# Patient Record
Sex: MALE | Race: WHITE | NOT HISPANIC OR LATINO | Employment: OTHER | URBAN - METROPOLITAN AREA
[De-identification: names, ages, dates, MRNs, and addresses within clinical notes are randomized per-mention and may not be internally consistent; named-entity substitution may affect disease eponyms.]

---

## 2021-04-08 ENCOUNTER — NURSING HOME VISIT (OUTPATIENT)
Dept: FAMILY MEDICINE CLINIC | Facility: CLINIC | Age: 86
End: 2021-04-08
Payer: COMMERCIAL

## 2021-04-08 DIAGNOSIS — M05.9 RHEUMATOID ARTHRITIS WITH POSITIVE RHEUMATOID FACTOR, INVOLVING UNSPECIFIED SITE (HCC): ICD-10-CM

## 2021-04-08 DIAGNOSIS — Z00.00 WELL ADULT EXAM: ICD-10-CM

## 2021-04-08 DIAGNOSIS — G30.1 LATE ONSET ALZHEIMER'S DISEASE WITH BEHAVIORAL DISTURBANCE (HCC): Primary | ICD-10-CM

## 2021-04-08 DIAGNOSIS — F03.91 AGITATION DUE TO DEMENTIA (HCC): ICD-10-CM

## 2021-04-08 DIAGNOSIS — N39.43 BENIGN PROSTATIC HYPERPLASIA WITH POST-VOID DRIBBLING: ICD-10-CM

## 2021-04-08 DIAGNOSIS — N40.1 BENIGN PROSTATIC HYPERPLASIA WITH POST-VOID DRIBBLING: ICD-10-CM

## 2021-04-08 DIAGNOSIS — F02.81 LATE ONSET ALZHEIMER'S DISEASE WITH BEHAVIORAL DISTURBANCE (HCC): Primary | ICD-10-CM

## 2021-04-08 PROCEDURE — 1123F ACP DISCUSS/DSCN MKR DOCD: CPT | Performed by: FAMILY MEDICINE

## 2021-04-08 PROCEDURE — 99335 PR DOM/R-HOME E/M EST PT LW MOD SEVERITY 25 MINUTES: CPT | Performed by: FAMILY MEDICINE

## 2021-04-08 PROCEDURE — G0439 PPPS, SUBSEQ VISIT: HCPCS | Performed by: FAMILY MEDICINE

## 2021-04-11 VITALS
SYSTOLIC BLOOD PRESSURE: 142 MMHG | HEIGHT: 74 IN | TEMPERATURE: 98 F | HEART RATE: 84 BPM | DIASTOLIC BLOOD PRESSURE: 80 MMHG | BODY MASS INDEX: 32.34 KG/M2 | WEIGHT: 252 LBS | RESPIRATION RATE: 18 BRPM

## 2021-04-11 PROBLEM — F03.911 AGITATION DUE TO DEMENTIA: Status: ACTIVE | Noted: 2021-04-11

## 2021-04-11 PROBLEM — N39.43 BENIGN PROSTATIC HYPERPLASIA WITH POST-VOID DRIBBLING: Status: ACTIVE | Noted: 2021-04-11

## 2021-04-11 PROBLEM — N40.1 BENIGN PROSTATIC HYPERPLASIA WITH POST-VOID DRIBBLING: Status: ACTIVE | Noted: 2021-04-11

## 2021-04-11 PROBLEM — M05.9 RHEUMATOID ARTHRITIS WITH POSITIVE RHEUMATOID FACTOR (HCC): Status: ACTIVE | Noted: 2021-04-11

## 2021-04-11 PROBLEM — F02.81 LATE ONSET ALZHEIMER'S DISEASE WITH BEHAVIORAL DISTURBANCE (HCC): Status: ACTIVE | Noted: 2021-04-11

## 2021-04-11 PROBLEM — G30.1 LATE ONSET ALZHEIMER'S DISEASE WITH BEHAVIORAL DISTURBANCE (HCC): Status: ACTIVE | Noted: 2021-04-11

## 2021-04-11 PROBLEM — F02.818 LATE ONSET ALZHEIMER'S DISEASE WITH BEHAVIORAL DISTURBANCE (HCC): Status: ACTIVE | Noted: 2021-04-11

## 2021-04-11 PROBLEM — F03.91 AGITATION DUE TO DEMENTIA (HCC): Status: ACTIVE | Noted: 2021-04-11

## 2021-04-11 RX ORDER — CITALOPRAM 20 MG/1
1 TABLET ORAL DAILY
COMMUNITY

## 2021-04-11 RX ORDER — DONEPEZIL HYDROCHLORIDE 10 MG/1
10 TABLET, FILM COATED ORAL
Qty: 30 TABLET | Refills: 11
Start: 2021-04-11

## 2021-04-11 RX ORDER — CLOPIDOGREL BISULFATE 75 MG/1
1 TABLET ORAL DAILY
COMMUNITY

## 2021-04-11 RX ORDER — METOPROLOL TARTRATE 50 MG/1
1 TABLET, FILM COATED ORAL DAILY
COMMUNITY

## 2021-04-11 RX ORDER — PREDNISONE 20 MG/1
1 TABLET ORAL DAILY
COMMUNITY

## 2021-04-11 NOTE — PATIENT INSTRUCTIONS

## 2021-04-11 NOTE — PROGRESS NOTES
BMI Counseling: Body mass index is 32 35 kg/m²  The BMI is above normal  Nutrition recommendations include decreasing portion sizes, encouraging healthy choices of fruits and vegetables, decreasing fast food intake, consuming healthier snacks, limiting drinks that contain sugar, moderation in carbohydrate intake, increasing intake of lean protein, reducing intake of saturated and trans fat and reducing intake of cholesterol  No pharmacotherapy was ordered  Patient referred to PCP due to patient being overweight  Assessment/Plan:         Problem List Items Addressed This Visit        Nervous and Auditory    Late onset Alzheimer's disease with behavioral disturbance ([facility] [address] ) - Primary     Add aricept  And check UA C+S  Consider  seroquelinI future prn  Relevant Medications    citalopram (CeleXA) 20 mg tablet    donepezil (ARICEPT) 10 mg tablet    Other Relevant Orders    UA/M w/rflx Culture, Routine       Musculoskeletal and Integument    Rheumatoid arthritis with positive rheumatoid factor (HCC)    Relevant Medications    predniSONE 20 mg tablet       Genitourinary    Benign prostatic hyperplasia with post-void dribbling       Other    Agitation due to dementia [facility])      Other Visit Diagnoses     Well adult exam                Subjective:      Patient ID: Lele Orr is a 80 y o  male  This is an 40-year-old white male seen examined at an assisted living facility  Patient has been noted by staff to be more wandering and trying to leave the facility  They state his left dementia has improved significantly recently and he is looking get evaluated today  Patient is oblivious to his the situation at this point  He is alert and awake and he confabulates quite a bit we your asking questions    Patient is not agitated at this time although he is somewhat confused staff reports at night he does do more wandering and at times of was looking to leave facility although he never made a real full hard attempt  The following portions of the patient's history were reviewed and updated as appropriate:   Past Medical History:  He has no past medical history on file ,  _______________________________________________________________________  Medical Problems:  does not have any pertinent problems on file ,  _______________________________________________________________________  Past Surgical History:   has no past surgical history on file ,  _______________________________________________________________________  Family History:  family history is not on file ,  _______________________________________________________________________  Social History:   reports that he has never smoked  He has never used smokeless tobacco  He reports previous alcohol use  He reports that he does not use drugs  ,  _______________________________________________________________________  Allergies:  has no allergies on file     _______________________________________________________________________  Current Outpatient Medications   Medication Sig Dispense Refill    citalopram (CeleXA) 20 mg tablet Take 1 tablet by mouth daily      clopidogrel (Plavix) 75 mg tablet Take 1 tablet by mouth daily      donepezil (ARICEPT) 10 mg tablet Take 1 tablet (10 mg total) by mouth daily at bedtime 30 tablet 11    metoprolol tartrate (LOPRESSOR) 50 mg tablet Take 1 tablet by mouth daily      predniSONE 20 mg tablet Take 1 tablet by mouth daily       No current facility-administered medications for this visit       _______________________________________________________________________  Review of Systems   Constitutional: Positive for activity change  Negative for appetite change, chills, fatigue, fever and unexpected weight change  HENT: Negative for congestion, ear pain, hearing loss, mouth sores, postnasal drip, sinus pressure, sinus pain, sneezing and sore throat  Respiratory: Negative for apnea, cough, shortness of breath and wheezing  Cardiovascular: Negative for chest pain, palpitations and leg swelling  Gastrointestinal: Negative for abdominal pain, constipation, diarrhea, nausea and vomiting  Endocrine: Negative for cold intolerance and heat intolerance  Genitourinary: Negative for dysuria, frequency and hematuria  Musculoskeletal: Negative for arthralgias, back pain, gait problem, joint swelling and neck pain  Skin: Negative for rash  Neurological: Negative for dizziness, weakness and numbness  Hematological: Does not bruise/bleed easily  Psychiatric/Behavioral: Positive for agitation and confusion  Negative for behavioral problems, hallucinations and sleep disturbance  The patient is not nervous/anxious  Objective:  Vitals:    04/11/21 1516   BP: 142/80   Pulse: 84   Resp: 18   Temp: 98 °F (36 7 °C)   Weight: 114 kg (252 lb)   Height: 6' 2" (1 88 m)     Body mass index is 32 35 kg/m²  Physical Exam  Vitals signs and nursing note reviewed  Constitutional:       General: He is in acute distress  Appearance: He is well-developed  He is obese  He is not ill-appearing or toxic-appearing  HENT:      Head: Normocephalic and atraumatic  Nose: Nose normal       Mouth/Throat:      Mouth: Mucous membranes are moist    Eyes:      General: No scleral icterus  Conjunctiva/sclera: Conjunctivae normal       Pupils: Pupils are equal, round, and reactive to light  Neck:      Musculoskeletal: Normal range of motion and neck supple  Thyroid: No thyromegaly  Cardiovascular:      Rate and Rhythm: Normal rate and regular rhythm  Heart sounds: Normal heart sounds  Pulmonary:      Effort: Pulmonary effort is normal  No respiratory distress  Breath sounds: Normal breath sounds  No wheezing  Abdominal:      General: Bowel sounds are normal       Palpations: Abdomen is soft  Tenderness: There is no abdominal tenderness  There is no guarding or rebound     Musculoskeletal: Normal range of motion  Skin:     General: Skin is warm and dry  Findings: No rash  Neurological:      Mental Status: He is alert  He is disoriented  Motor: Weakness present        Gait: Gait abnormal    Psychiatric:         Behavior: Behavior normal

## 2021-05-13 ENCOUNTER — NURSING HOME VISIT (OUTPATIENT)
Dept: FAMILY MEDICINE CLINIC | Facility: CLINIC | Age: 86
End: 2021-05-13
Payer: COMMERCIAL

## 2021-05-13 DIAGNOSIS — F02.81 LATE ONSET ALZHEIMER'S DISEASE WITH BEHAVIORAL DISTURBANCE (HCC): Primary | ICD-10-CM

## 2021-05-13 DIAGNOSIS — G30.1 LATE ONSET ALZHEIMER'S DISEASE WITH BEHAVIORAL DISTURBANCE (HCC): Primary | ICD-10-CM

## 2021-05-13 PROCEDURE — 99335 PR DOM/R-HOME E/M EST PT LW MOD SEVERITY 25 MINUTES: CPT | Performed by: FAMILY MEDICINE

## 2021-05-23 RX ORDER — QUETIAPINE FUMARATE 50 MG/1
50 TABLET, FILM COATED ORAL
Qty: 30 TABLET | Refills: 11
Start: 2021-05-23

## 2021-05-23 NOTE — ASSESSMENT & PLAN NOTE
Will increase seroquel to 50 mg q hs and continue  With mood/behavioral modification  redireting patient

## 2021-05-23 NOTE — PROGRESS NOTES
[facility] Plan of Care: balance, strength, and gait training instructions were provided  Home safety education provided  Assessment/Plan:         Problem List Items Addressed This Visit        Nervous and Auditory    Late onset Alzheimer's disease with behavioral disturbance ([facility] [address] ) - Primary            Subjective:      Patient ID: Vinod Rosen is a 80 y o  male  This 66-year-old white male seen examined at the assisted living facility  Patient has dementia and has been having problems with behaviors with some agitation and rude behavior with other residents as well as the staff  Patient had been started on Seroquel 25 mg daily at bedtime will increase up to 50 mg and discussed with staff about redirecting the patient would become agitated and try to avoid irritating him when  he has some worsening dementia  The following portions of the patient's history were reviewed and updated as appropriate:   Past Medical History:  He has no past medical history on file ,  _______________________________________________________________________  Medical Problems:  does not have any pertinent problems on file ,  _______________________________________________________________________  Past Surgical History:   has no past surgical history on file ,  _______________________________________________________________________  Family History:  family history is not on file ,  _______________________________________________________________________  Social History:   reports that he has never smoked  He has never used smokeless tobacco  He reports previous alcohol use  He reports that he does not use drugs  ,  _______________________________________________________________________  Allergies:  is allergic to doxycycline; levaquin [levofloxacin]; and penicillins     _______________________________________________________________________  Current Outpatient Medications   Medication Sig Dispense Refill    citalopram (CeleXA) 20 mg tablet Take 1 tablet by mouth daily      clopidogrel (Plavix) 75 mg tablet Take 1 tablet by mouth daily      donepezil (ARICEPT) 10 mg tablet Take 1 tablet (10 mg total) by mouth daily at bedtime 30 tablet 11    metoprolol tartrate (LOPRESSOR) 50 mg tablet Take 1 tablet by mouth daily      predniSONE 20 mg tablet Take 1 tablet by mouth daily       No current facility-administered medications for this visit       _______________________________________________________________________  Review of Systems   Constitutional: Negative for activity change, appetite change, chills, fatigue, fever and unexpected weight change  HENT: Negative for congestion, ear pain, hearing loss, mouth sores, postnasal drip, sinus pressure, sinus pain, sneezing and sore throat  Respiratory: Negative for apnea, cough, shortness of breath and wheezing  Cardiovascular: Negative for chest pain, palpitations and leg swelling  Gastrointestinal: Negative for abdominal pain, constipation, diarrhea, nausea and vomiting  Endocrine: Negative for cold intolerance and heat intolerance  Genitourinary: Negative for dysuria, frequency and hematuria  Musculoskeletal: Negative for arthralgias, back pain, gait problem, joint swelling and neck pain  Skin: Negative for rash  Neurological: Negative for dizziness, weakness and numbness  Hematological: Does not bruise/bleed easily  Psychiatric/Behavioral: Positive for agitation, behavioral problems and confusion  Negative for hallucinations and sleep disturbance  The patient is nervous/anxious  Objective: There were no vitals filed for this visit  There is no height or weight on file to calculate BMI  Physical Exam  Vitals signs and nursing note reviewed  Constitutional:       Appearance: He is well-developed  He is obese  HENT:      Head: Normocephalic and atraumatic  Eyes:      General: No scleral icterus       Conjunctiva/sclera: Conjunctivae normal  Pupils: Pupils are equal, round, and reactive to light  Neck:      Musculoskeletal: Normal range of motion and neck supple  Thyroid: No thyromegaly  Cardiovascular:      Rate and Rhythm: Normal rate and regular rhythm  Heart sounds: Normal heart sounds  Pulmonary:      Effort: Pulmonary effort is normal  No respiratory distress  Breath sounds: Normal breath sounds  No wheezing  Abdominal:      General: Bowel sounds are normal       Palpations: Abdomen is soft  Tenderness: There is no abdominal tenderness  There is no guarding or rebound  Musculoskeletal: Normal range of motion  Skin:     General: Skin is warm and dry  Findings: No rash  Neurological:      Mental Status: He is alert  Mental status is at baseline  He is disoriented     Psychiatric:         Behavior: Behavior normal

## 2021-09-23 ENCOUNTER — NURSING HOME VISIT (OUTPATIENT)
Dept: FAMILY MEDICINE CLINIC | Facility: CLINIC | Age: 86
End: 2021-09-23
Payer: COMMERCIAL

## 2021-09-23 DIAGNOSIS — F03.91 AGITATION DUE TO DEMENTIA (HCC): ICD-10-CM

## 2021-09-23 DIAGNOSIS — G30.1 LATE ONSET ALZHEIMER'S DISEASE WITH BEHAVIORAL DISTURBANCE (HCC): Primary | ICD-10-CM

## 2021-09-23 DIAGNOSIS — M05.9 RHEUMATOID ARTHRITIS WITH POSITIVE RHEUMATOID FACTOR, INVOLVING UNSPECIFIED SITE (HCC): ICD-10-CM

## 2021-09-23 DIAGNOSIS — R53.83 OTHER FATIGUE: ICD-10-CM

## 2021-09-23 DIAGNOSIS — F02.81 LATE ONSET ALZHEIMER'S DISEASE WITH BEHAVIORAL DISTURBANCE (HCC): Primary | ICD-10-CM

## 2021-09-23 DIAGNOSIS — I73.9 PVD (PERIPHERAL VASCULAR DISEASE) (HCC): ICD-10-CM

## 2021-09-23 DIAGNOSIS — I10 PRIMARY HYPERTENSION: ICD-10-CM

## 2021-09-23 PROCEDURE — 99318 PR E/M ANNUAL NURSING FACILITY ASSESS STABLE 30 MIN: CPT | Performed by: FAMILY MEDICINE

## 2021-10-24 VITALS
HEART RATE: 84 BPM | WEIGHT: 252 LBS | BODY MASS INDEX: 35.28 KG/M2 | SYSTOLIC BLOOD PRESSURE: 126 MMHG | HEIGHT: 71 IN | RESPIRATION RATE: 18 BRPM | DIASTOLIC BLOOD PRESSURE: 74 MMHG | TEMPERATURE: 98 F

## 2021-10-24 PROBLEM — I10 PRIMARY HYPERTENSION: Status: ACTIVE | Noted: 2021-10-24

## 2021-10-24 PROBLEM — I73.9 PVD (PERIPHERAL VASCULAR DISEASE) (HCC): Status: ACTIVE | Noted: 2021-10-24

## 2022-05-12 ENCOUNTER — IN HOME VISIT (OUTPATIENT)
Dept: FAMILY MEDICINE CLINIC | Facility: CLINIC | Age: 87
End: 2022-05-12
Payer: COMMERCIAL

## 2022-05-12 DIAGNOSIS — U07.1 COVID-19: Primary | ICD-10-CM

## 2022-05-12 PROCEDURE — 99336 PR DOM/R-HOME E/M EST PT MOD HI SEVERITY 40 MINUTES: CPT | Performed by: FAMILY MEDICINE

## 2022-05-12 NOTE — PROGRESS NOTES
Assessment/Plan:         Problem List Items Addressed This Visit    None     Visit Diagnoses     COVID-19    -  Primary    Relevant Medications    molnupiravir 200 mg capsule    Other Relevant Orders    XR chest pa & lateral            Subjective:      Patient ID: Krystina Ni is a 80 y o  male  5year-old male seen examined this is living facility for COVID-19  Patient has been having some issues with some cough and congestion some nasal can runny nose but no chest pain or shortness of breath or leg pain  He denies any taste or smell alterations  Patient was screened by staff of his done any positive for COVID  The following portions of the patient's history were reviewed and updated as appropriate:   Past Medical History:  He has no past medical history on file ,  _______________________________________________________________________  Medical Problems:  does not have any pertinent problems on file ,  _______________________________________________________________________  Past Surgical History:   has no past surgical history on file ,  _______________________________________________________________________  Family History:  family history is not on file ,  _______________________________________________________________________  Social History:   reports that he has never smoked  He has never used smokeless tobacco  He reports previous alcohol use  He reports that he does not use drugs  ,  _______________________________________________________________________  Allergies:  is allergic to doxycycline, levaquin [levofloxacin], and penicillins     _______________________________________________________________________  Current Outpatient Medications   Medication Sig Dispense Refill    molnupiravir 200 mg capsule Take 4 capsules (800 mg total) by mouth every 12 (twelve) hours for 5 days 40 capsule 0    citalopram (CeleXA) 20 mg tablet Take 1 tablet by mouth daily      clopidogrel (Plavix) 75 mg tablet Take 1 tablet by mouth daily      donepezil (ARICEPT) 10 mg tablet Take 1 tablet (10 mg total) by mouth daily at bedtime 30 tablet 11    metoprolol tartrate (LOPRESSOR) 50 mg tablet Take 1 tablet by mouth daily      predniSONE 20 mg tablet Take 1 tablet by mouth daily      QUEtiapine (SEROquel) 50 mg tablet Take 1 tablet (50 mg total) by mouth daily at bedtime 30 tablet 11     No current facility-administered medications for this visit      _______________________________________________________________________  Review of Systems   Constitutional: Positive for fatigue  Negative for activity change, appetite change, chills, fever and unexpected weight change  HENT: Positive for postnasal drip  Negative for congestion, ear pain, hearing loss, mouth sores, sinus pressure, sinus pain, sneezing and sore throat  Respiratory: Positive for cough  Negative for apnea, shortness of breath and wheezing  Cardiovascular: Negative for chest pain, palpitations and leg swelling  Gastrointestinal: Negative for abdominal pain, constipation, diarrhea, nausea and vomiting  Endocrine: Negative for cold intolerance and heat intolerance  Genitourinary: Negative for dysuria, frequency and hematuria  Musculoskeletal: Negative for arthralgias, back pain, gait problem, joint swelling and neck pain  Skin: Negative for rash  Neurological: Negative for dizziness, weakness and numbness  Hematological: Does not bruise/bleed easily  Psychiatric/Behavioral: Negative for agitation, behavioral problems, confusion, hallucinations and sleep disturbance  The patient is not nervous/anxious  Objective: There were no vitals filed for this visit  There is no height or weight on file to calculate BMI  Physical Exam  Vitals and nursing note reviewed  Constitutional:       Appearance: He is well-developed  HENT:      Head: Normocephalic and atraumatic        Nose: Nose normal       Mouth/Throat:      Mouth: Mucous membranes are moist    Eyes:      General: No scleral icterus  Conjunctiva/sclera: Conjunctivae normal       Pupils: Pupils are equal, round, and reactive to light  Neck:      Thyroid: No thyromegaly  Cardiovascular:      Rate and Rhythm: Normal rate and regular rhythm  Heart sounds: Normal heart sounds  Pulmonary:      Effort: Pulmonary effort is normal  No respiratory distress  Breath sounds: Normal breath sounds  No wheezing  Abdominal:      General: Bowel sounds are normal       Palpations: Abdomen is soft  Tenderness: There is no abdominal tenderness  There is no guarding or rebound  Musculoskeletal:         General: Normal range of motion  Cervical back: Normal range of motion and neck supple  Skin:     General: Skin is warm and dry  Findings: No rash  Neurological:      Mental Status: He is alert and oriented to person, place, and time     Psychiatric:         Behavior: Behavior normal

## 2022-06-18 ENCOUNTER — APPOINTMENT (EMERGENCY)
Dept: CT IMAGING | Facility: HOSPITAL | Age: 87
End: 2022-06-18
Payer: COMMERCIAL

## 2022-06-18 ENCOUNTER — HOSPITAL ENCOUNTER (EMERGENCY)
Facility: HOSPITAL | Age: 87
Discharge: HOME | End: 2022-06-19
Attending: EMERGENCY MEDICINE
Payer: COMMERCIAL

## 2022-06-18 ENCOUNTER — APPOINTMENT (EMERGENCY)
Dept: ULTRASOUND IMAGING | Facility: HOSPITAL | Age: 87
End: 2022-06-18
Payer: COMMERCIAL

## 2022-06-18 DIAGNOSIS — R07.89 ATYPICAL CHEST PAIN: Primary | ICD-10-CM

## 2022-06-18 LAB
2HR DELTA HS TROPONIN: -1 NG/L
4HR DELTA HS TROPONIN: 1 NG/L
ALBUMIN SERPL BCP-MCNC: 3.3 G/DL (ref 3.5–5)
ALP SERPL-CCNC: 59 U/L (ref 34–104)
ALT SERPL W P-5'-P-CCNC: 18 U/L (ref 7–52)
ANION GAP SERPL CALCULATED.3IONS-SCNC: 7 MMOL/L (ref 4–13)
AST SERPL W P-5'-P-CCNC: 75 U/L (ref 13–39)
ATRIAL RATE: 64 BPM
BASOPHILS # BLD AUTO: 0.02 THOUSANDS/ΜL (ref 0–0.1)
BASOPHILS NFR BLD AUTO: 0 % (ref 0–1)
BILIRUB SERPL-MCNC: 0.5 MG/DL (ref 0.2–1)
BNP SERPL-MCNC: 185 PG/ML (ref 0–100)
BUN SERPL-MCNC: 35 MG/DL (ref 5–25)
CALCIUM ALBUM COR SERPL-MCNC: 8.7 MG/DL (ref 8.3–10.1)
CALCIUM SERPL-MCNC: 8.1 MG/DL (ref 8.4–10.2)
CARDIAC TROPONIN I PNL SERPL HS: 28 NG/L
CARDIAC TROPONIN I PNL SERPL HS: 29 NG/L
CARDIAC TROPONIN I PNL SERPL HS: 30 NG/L
CHLORIDE SERPL-SCNC: 109 MMOL/L (ref 96–108)
CO2 SERPL-SCNC: 26 MMOL/L (ref 21–32)
CREAT SERPL-MCNC: 1.74 MG/DL (ref 0.6–1.3)
D DIMER PPP FEU-MCNC: 1.52 UG/ML FEU
EOSINOPHIL # BLD AUTO: 0.25 THOUSAND/ΜL (ref 0–0.61)
EOSINOPHIL NFR BLD AUTO: 4 % (ref 0–6)
ERYTHROCYTE [DISTWIDTH] IN BLOOD BY AUTOMATED COUNT: 13.9 % (ref 11.6–15.1)
GFR SERPL CREATININE-BSD FRML MDRD: 33 ML/MIN/1.73SQ M
GLUCOSE SERPL-MCNC: 129 MG/DL (ref 65–140)
HCT VFR BLD AUTO: 37.6 % (ref 36.5–49.3)
HGB BLD-MCNC: 12 G/DL (ref 12–17)
IMM GRANULOCYTES # BLD AUTO: 0.04 THOUSAND/UL (ref 0–0.2)
IMM GRANULOCYTES NFR BLD AUTO: 1 % (ref 0–2)
LIPASE SERPL-CCNC: 25 U/L (ref 11–82)
LYMPHOCYTES # BLD AUTO: 1.29 THOUSANDS/ΜL (ref 0.6–4.47)
LYMPHOCYTES NFR BLD AUTO: 19 % (ref 14–44)
MCH RBC QN AUTO: 31.8 PG (ref 26.8–34.3)
MCHC RBC AUTO-ENTMCNC: 31.9 G/DL (ref 31.4–37.4)
MCV RBC AUTO: 100 FL (ref 82–98)
MONOCYTES # BLD AUTO: 0.57 THOUSAND/ΜL (ref 0.17–1.22)
MONOCYTES NFR BLD AUTO: 9 % (ref 4–12)
NEUTROPHILS # BLD AUTO: 4.53 THOUSANDS/ΜL (ref 1.85–7.62)
NEUTS SEG NFR BLD AUTO: 67 % (ref 43–75)
NRBC BLD AUTO-RTO: 0 /100 WBCS
P AXIS: 76 DEGREES
PLATELET # BLD AUTO: 157 THOUSANDS/UL (ref 149–390)
PMV BLD AUTO: 11.1 FL (ref 8.9–12.7)
POTASSIUM SERPL-SCNC: 4.5 MMOL/L (ref 3.5–5.3)
PR INTERVAL: 200 MS
PROT SERPL-MCNC: 5.7 G/DL (ref 6.4–8.4)
QRS AXIS: 79 DEGREES
QRSD INTERVAL: 130 MS
QT INTERVAL: 456 MS
QTC INTERVAL: 464 MS
RBC # BLD AUTO: 3.77 MILLION/UL (ref 3.88–5.62)
SODIUM SERPL-SCNC: 142 MMOL/L (ref 135–147)
T WAVE AXIS: 19 DEGREES
VENTRICULAR RATE: 62 BPM
WBC # BLD AUTO: 6.7 THOUSAND/UL (ref 4.31–10.16)

## 2022-06-18 PROCEDURE — 36415 COLL VENOUS BLD VENIPUNCTURE: CPT | Performed by: EMERGENCY MEDICINE

## 2022-06-18 PROCEDURE — 99285 EMERGENCY DEPT VISIT HI MDM: CPT | Performed by: EMERGENCY MEDICINE

## 2022-06-18 PROCEDURE — 71275 CT ANGIOGRAPHY CHEST: CPT

## 2022-06-18 PROCEDURE — 96360 HYDRATION IV INFUSION INIT: CPT

## 2022-06-18 PROCEDURE — 74177 CT ABD & PELVIS W/CONTRAST: CPT

## 2022-06-18 PROCEDURE — 85379 FIBRIN DEGRADATION QUANT: CPT | Performed by: EMERGENCY MEDICINE

## 2022-06-18 PROCEDURE — 96361 HYDRATE IV INFUSION ADD-ON: CPT

## 2022-06-18 PROCEDURE — 93010 ELECTROCARDIOGRAM REPORT: CPT | Performed by: INTERNAL MEDICINE

## 2022-06-18 PROCEDURE — 83880 ASSAY OF NATRIURETIC PEPTIDE: CPT | Performed by: EMERGENCY MEDICINE

## 2022-06-18 PROCEDURE — 99285 EMERGENCY DEPT VISIT HI MDM: CPT

## 2022-06-18 PROCEDURE — 76705 ECHO EXAM OF ABDOMEN: CPT

## 2022-06-18 PROCEDURE — G1004 CDSM NDSC: HCPCS

## 2022-06-18 PROCEDURE — 80053 COMPREHEN METABOLIC PANEL: CPT | Performed by: EMERGENCY MEDICINE

## 2022-06-18 PROCEDURE — 84484 ASSAY OF TROPONIN QUANT: CPT | Performed by: EMERGENCY MEDICINE

## 2022-06-18 PROCEDURE — 83690 ASSAY OF LIPASE: CPT | Performed by: EMERGENCY MEDICINE

## 2022-06-18 PROCEDURE — 85025 COMPLETE CBC W/AUTO DIFF WBC: CPT | Performed by: EMERGENCY MEDICINE

## 2022-06-18 PROCEDURE — 93005 ELECTROCARDIOGRAM TRACING: CPT

## 2022-06-18 RX ORDER — TAMSULOSIN HYDROCHLORIDE 0.4 MG/1
0.4 CAPSULE ORAL
Status: DISCONTINUED | OUTPATIENT
Start: 2022-06-18 | End: 2022-06-19 | Stop reason: HOSPADM

## 2022-06-18 RX ORDER — QUETIAPINE FUMARATE 25 MG/1
100 TABLET, FILM COATED ORAL
Status: DISCONTINUED | OUTPATIENT
Start: 2022-06-18 | End: 2022-06-19 | Stop reason: HOSPADM

## 2022-06-18 RX ORDER — PREDNISONE 10 MG/1
10 TABLET ORAL DAILY
Status: DISCONTINUED | OUTPATIENT
Start: 2022-06-19 | End: 2022-06-19 | Stop reason: HOSPADM

## 2022-06-18 RX ORDER — MEMANTINE HYDROCHLORIDE 10 MG/1
10 TABLET ORAL 2 TIMES DAILY
Status: DISCONTINUED | OUTPATIENT
Start: 2022-06-18 | End: 2022-06-19 | Stop reason: HOSPADM

## 2022-06-18 RX ORDER — GABAPENTIN 100 MG/1
100 CAPSULE ORAL 2 TIMES DAILY
Status: DISCONTINUED | OUTPATIENT
Start: 2022-06-18 | End: 2022-06-19 | Stop reason: HOSPADM

## 2022-06-18 RX ORDER — DONEPEZIL HYDROCHLORIDE 5 MG/1
10 TABLET, FILM COATED ORAL
Status: DISCONTINUED | OUTPATIENT
Start: 2022-06-18 | End: 2022-06-19 | Stop reason: HOSPADM

## 2022-06-18 RX ADMIN — IOHEXOL 65 ML: 350 INJECTION, SOLUTION INTRAVENOUS at 12:58

## 2022-06-18 RX ADMIN — GABAPENTIN 100 MG: 100 CAPSULE ORAL at 19:53

## 2022-06-18 RX ADMIN — TAMSULOSIN HYDROCHLORIDE 0.4 MG: 0.4 CAPSULE ORAL at 19:53

## 2022-06-18 RX ADMIN — DONEPEZIL HYDROCHLORIDE 10 MG: 5 TABLET, FILM COATED ORAL at 21:53

## 2022-06-18 RX ADMIN — SODIUM CHLORIDE 500 ML: 0.9 INJECTION, SOLUTION INTRAVENOUS at 12:42

## 2022-06-18 RX ADMIN — MEMANTINE 10 MG: 10 TABLET ORAL at 19:53

## 2022-06-18 RX ADMIN — QUETIAPINE FUMARATE 100 MG: 25 TABLET ORAL at 21:53

## 2022-06-18 NOTE — DISCHARGE INSTRUCTIONS
Mr Mira Frazier liver function was mildly abnormal in the Emergency Department today  He should have his liver function, kidney function, and blood pressure rechecked by his doctor in 1 week

## 2022-06-18 NOTE — ED PROVIDER NOTES
History  Chief Complaint   Patient presents with    Chest Pain     Chest pain that pt has "had for a while " Hx of dementia, pt poor historian  HPI    42-year-old male with history of dementia, hypertension, MI with PCI in his 45s  Patient presents for evaluation of substernal chest pain  He is a poor historian due to his dementia but tells me that he thinks that started earlier in the day today  He has difficulty describing the pain but states that it does not radiate  Reports nausea without vomiting  No dizziness or difficulty breathing  He also reports some periumbilical abdominal pain but is unable to provide any other details regarding this  No fever or chills  He received nitroglycerin and route by EMS but does not remember whether or not this helps with his pain  I a made numerous attempts to get in contact with the patient's skilled nursing facility that were unsuccessful  I did speak with his daughter, Brynn Lucas, who is his healthcare power of   She confirms that he does have a history of MI with stenting in his 45s  Denies knowledge of complaint of chest pain prior to today  She confirms that the patient is currently at his baseline mental status which is alert, oriented only to person and place, and pleasantly confused  Prior to Admission Medications   Prescriptions Last Dose Informant Patient Reported? Taking?    QUEtiapine (SEROquel) 50 mg tablet   No No   Sig: Take 1 tablet (50 mg total) by mouth daily at bedtime   citalopram (CeleXA) 20 mg tablet   Yes No   Sig: Take 1 tablet by mouth daily   clopidogrel (Plavix) 75 mg tablet   Yes No   Sig: Take 1 tablet by mouth daily   donepezil (ARICEPT) 10 mg tablet   No No   Sig: Take 1 tablet (10 mg total) by mouth daily at bedtime   metoprolol tartrate (LOPRESSOR) 50 mg tablet   Yes No   Sig: Take 1 tablet by mouth daily   predniSONE 20 mg tablet   Yes No   Sig: Take 1 tablet by mouth daily      Facility-Administered Medications: None       History reviewed  No pertinent past medical history  History reviewed  No pertinent surgical history  History reviewed  No pertinent family history  I have reviewed and agree with the history as documented  E-Cigarette/Vaping     E-Cigarette/Vaping Substances     Social History     Tobacco Use    Smoking status: Never Smoker    Smokeless tobacco: Never Used   Substance Use Topics    Alcohol use: Not Currently    Drug use: Never       Review of Systems   Unable to perform ROS: Dementia       Physical Exam  Physical Exam  Constitutional:       General: He is not in acute distress  Appearance: He is well-developed  He is not diaphoretic  HENT:      Head: Normocephalic and atraumatic  Right Ear: External ear normal       Left Ear: External ear normal       Nose: Nose normal    Eyes:      Conjunctiva/sclera: Conjunctivae normal    Cardiovascular:      Rate and Rhythm: Normal rate and regular rhythm  Pulses: Normal pulses  Heart sounds: Normal heart sounds  No murmur heard  No friction rub  No gallop  Pulmonary:      Effort: Pulmonary effort is normal  No respiratory distress  Breath sounds: Normal breath sounds  No wheezing or rales  Abdominal:      General: Bowel sounds are normal  There is no distension  Palpations: Abdomen is soft  Tenderness: There is no abdominal tenderness  There is no guarding  Comments: Small umbilical hernia, easily reducible, nontender, without overlying skin changes  Patient reports mild tenderness to deep palpation in the periumbilical region of the abdomen and in the epigastrium  Musculoskeletal:         General: No deformity  Normal range of motion  Cervical back: Normal range of motion and neck supple  Comments: No calf swelling or tenderness   Skin:     General: Skin is warm and dry  Neurological:      Mental Status: He is alert  Motor: No abnormal muscle tone        Comments: Oriented to person and knows that he is in the hospital   Otherwise disoriented and unable to provide reliable medical history   Psychiatric:         Mood and Affect: Mood normal          Vital Signs  ED Triage Vitals   Temperature Pulse Respirations Blood Pressure SpO2   06/18/22 1051 06/18/22 1051 06/18/22 1051 06/18/22 1051 06/18/22 1051   97 6 °F (36 4 °C) 66 16 119/54 98 %      Temp Source Heart Rate Source Patient Position - Orthostatic VS BP Location FiO2 (%)   06/18/22 1051 06/18/22 1051 06/18/22 1313 -- --   Oral Monitor Sitting        Pain Score       06/18/22 1313       No Pain           Vitals:    06/18/22 1051 06/18/22 1130 06/18/22 1200 06/18/22 1313   BP: 119/54 118/59 145/66 (!) 183/86   Pulse: 66 58 58 68   Patient Position - Orthostatic VS:    Sitting         Visual Acuity      ED Medications  Medications   donepezil (ARICEPT) tablet 10 mg (has no administration in time range)   gabapentin (NEURONTIN) capsule 100 mg (has no administration in time range)   memantine (NAMENDA) tablet 10 mg (has no administration in time range)   predniSONE tablet 10 mg (has no administration in time range)   QUEtiapine (SEROquel) tablet 100 mg (has no administration in time range)   tamsulosin (FLOMAX) capsule 0 4 mg (has no administration in time range)   sodium chloride 0 9 % bolus 500 mL (0 mL Intravenous Stopped 6/18/22 1500)   iohexol (OMNIPAQUE) 350 MG/ML injection (SINGLE-DOSE) 65 mL (65 mL Intravenous Given 6/18/22 1258)       Diagnostic Studies  Results Reviewed     Procedure Component Value Units Date/Time    HS Troponin I 4hr [[id number]]  (Normal) Collected: 06/18/22 1755    Lab Status: Final result Specimen: Blood from Arm, Left Updated: 06/18/22 1831     hs TnI 4hr 30 ng/L      Delta 4hr hsTnI 1 ng/L     HS Troponin I 2hr [[id number]]  (Normal) Collected: 06/18/22 1516    Lab Status: Final result Specimen: Blood from Arm, Left Updated: 06/18/22 1604     hs TnI 2hr 28 ng/L      Delta 2hr hsTnI -1 ng/L     HS Troponin 0hr (reflex protocol) [[id number]]  (Normal) Collected: 06/18/22 1200    Lab Status: Final result Specimen: Blood from Arm, Left Updated: 06/18/22 1247     hs TnI 0hr 29 ng/L     B-Type Natriuretic Peptide(BNP), AN, CA, EA Campuses Only [[id number]]  (Abnormal) Collected: 06/18/22 1142    Lab Status: Final result Specimen: Blood Updated: 06/18/22 1228      pg/mL     Comprehensive metabolic panel [[id number]]  (Abnormal) Collected: 06/18/22 1141    Lab Status: Final result Specimen: Blood Updated: 06/18/22 1214     Sodium 142 mmol/L      Potassium 4 5 mmol/L      Chloride 109 mmol/L      CO2 26 mmol/L      ANION GAP 7 mmol/L      BUN 35 mg/dL      Creatinine 1 74 mg/dL      Glucose 129 mg/dL      Calcium 8 1 mg/dL      Corrected Calcium 8 7 mg/dL      AST 75 U/L      ALT 18 U/L      Alkaline Phosphatase 59 U/L      Total Protein 5 7 g/dL      Albumin 3 3 g/dL      Total Bilirubin 0 50 mg/dL      eGFR 33 ml/min/1 73sq m     Narrative:      [facility] guidelines for Chronic Kidney Disease (CKD):     Stage 1 with normal or high GFR (GFR > 90 mL/min/1 73 square meters)    Stage 2 Mild CKD (GFR = 60-89 mL/min/1 73 square meters)    Stage 3A Moderate CKD (GFR = 45-59 mL/min/1 73 square meters)    Stage 3B Moderate CKD (GFR = 30-44 mL/min/1 73 square meters)    Stage 4 Severe CKD (GFR = 15-29 mL/min/1 73 square meters)    Stage 5 End Stage CKD (GFR <15 mL/min/1 73 square meters)  Note: GFR calculation is accurate only with a steady state creatinine    Lipase [[id number]]  (Normal) Collected: 06/18/22 1141    Lab Status: Final result Specimen: Blood Updated: 06/18/22 1214     Lipase 25 u/L     D-dimer, quantitative [[id number]]  (Abnormal) Collected: 06/18/22 1142    Lab Status: Final result Specimen: Blood Updated: 06/18/22 1208     D-Dimer, Quant 1 52 ug/ml FEU     Narrative:       In the evaluation for possible pulmonary embolism, in the appropriate (Well's Score of 4 or less) patient, the age adjusted d-dimer cutoff for this patient can be calculated as:    Age x 0 01 (in ug/mL) for Age-adjusted D-dimer exclusion threshold for a patient over 50 years  CBC and differential [[id number]]  (Abnormal) Collected: 06/18/22 1142    Lab Status: Final result Specimen: Blood Updated: 06/18/22 1152     WBC 6 70 Thousand/uL      RBC 3 77 Million/uL      Hemoglobin 12 0 g/dL      Hematocrit 37 6 %       fL      MCH 31 8 pg      MCHC 31 9 g/dL      RDW 13 9 %      MPV 11 1 fL      Platelets 444 Thousands/uL      nRBC 0 /100 WBCs      Neutrophils Relative 67 %      Immat GRANS % 1 %      Lymphocytes Relative 19 %      Monocytes Relative 9 %      Eosinophils Relative 4 %      Basophils Relative 0 %      Neutrophils Absolute 4 53 Thousands/µL      Immature Grans Absolute 0 04 Thousand/uL      Lymphocytes Absolute 1 29 Thousands/µL      Monocytes Absolute 0 57 Thousand/µL      Eosinophils Absolute 0 25 Thousand/µL      Basophils Absolute 0 02 Thousands/µL                  US right upper quadrant   Final Result by Stefania West MD (06/18 8638)      Gallbladder is contracted with numerous calculi and a negative sonographic Palma's sign  Cholecystitis is unlikely  Pericholecystic fluid is nonspecific and may be secondary to hepatic disease  Workstation performed: [id number]         PE Study with CT abdomen & pelvis with contrast   Final Result by Baron Abbey MD (06/18 7083)      1  No pulmonary embolus is seen  2   2 cm maximum thickness posterior pericardial effusion  3   Abnormal appearing gallbladder with pericholecystic edema  A dedicated ultrasound is recommended  The study was marked in [facility] for immediate notification                    Workstation performed: [id number]                    Procedures  Procedures         ED Course  ED Course as of 06/18/22 1839   Sat Jun 18, 2022   1117 I personally interpreted the patient's EKG which reveals normal rate, normal sinus rhythm, normal axis, QTC of 464 milliseconds, QRS duration of 130 milliseconds, nonspecific T-wave abnormality isolated to lead 3, no prior available for comparison  1235 D-dimer elevated to 1 52  Will obtain CTA of the chest and scan through the abdomen pelvis given patient's symptoms  Baseline creatinine is unknown  Creatinine is elevated to 1 74 so will give 500 cc of normal saline prior to CT scan  1333 Initial troponin 29, delta pending  1509 CT scan of the chest with CT of the abdomen pelvis shows no evidence of PE  Patient does have a 2 cm pericardial effusion as well as abnormal appearance to the gallbladder with pericholecystic fluid  On reexamination he does have some mild tenderness to deep palpation of the right upper quadrant but denies nausea or vomiting  Per Radiology recommendations will obtain an ultrasound of the right upper quadrant for further evaluation  1736 [facility] right upper quadrant   1713 Delta troponin -1  US of the gallbladder without sonographic efrain's sign, not consistent with acute cholecysitis  1837 Discussed findings with patient's daughter who is his medical power of  by phone  She tells me that the patient has CKD stage 4 so his creatinine today is consistent with this  I discussed the abnormal appearance of his gallbladder with her by phone and questionable underlying liver disease with elevated AST of 75  As he is asymptomatic currently there is no indication for admission to the hospital but is daughter was counseled about the importance of follow-up with his primary care doctor in 1-2 weeks for repeat check of his kidney function, liver function, and blood pressure  She expressed agreement understanding and stated that she can help to make sure that this happens  Most of his blood pressures while in the emergency department were normal but he did have 1 hypertensive pressure to 926 systolic    As most of his pressures are normal, will not initiate treatment with antihypertensives at this time however his daughter was counseled that they should keep a log of his blood pressures and if they remain elevated he may need to be started on antihypertensive agent  Patient was discharged back to his skilled nursing facility in good condition with no complaints  He may be awaiting transport back to his facility for quite some time so home medications were ordered as well as a diet order  SBIRT 22yo+    Flowsheet Row Most Recent Value   SBIRT (23 yo +)    In order to provide better care to our patients, we are screening all of our patients for alcohol and drug use  Would it be okay to ask you these screening questions? Unable to answer at this time Filed at: 06/18/2022 1151                    MDM  Number of Diagnoses or Management Options  Atypical chest pain: new and requires workup  Diagnosis management comments: Please see ED course above for details of the Medical Decision Making  Amount and/or Complexity of Data Reviewed  Clinical lab tests: ordered and reviewed  Tests in the radiology section of CPT®: ordered and reviewed  Obtain history from someone other than the patient: yes  Review and summarize past medical records: yes  Independent visualization of images, tracings, or specimens: yes    Patient Progress  Patient progress: stable      Disposition  Final diagnoses:   Atypical chest pain     Time reflects when diagnosis was documented in both MDM as applicable and the Disposition within this note     Time User Action Codes Description Comment    6/18/2022  5:45 PM Diamond Madrigal Add [R07 89] Atypical chest pain       ED Disposition     ED Disposition   Discharge    Condition   Stable    Date/Time   Sat Jun 18, 2022  5:45 PM    Comment   Amanda Bonilla discharge to home/self care                 Follow-up Information     Follow up With Specialties Details Why Madonna Waldrop MD Family Medicine Please follow-up with your doctor in 1 week for recheck of your liver function, kidney function, and blood pressure  [id number] [facility] [address] [place] [id number] [phone]            Patient's Medications   Discharge Prescriptions    No medications on file       No discharge procedures on file      PDMP Review     None          ED Provider  Electronically Signed by           Kiran Bai MD  06/18/22 0342

## 2022-06-18 NOTE — ED NOTES
[facility] Contacted for transport back to [facility]  Did not give an ETA on  time        Ok Neither  06/18/22 1837       Ok Neither  06/18/22 1840

## 2022-06-19 VITALS
SYSTOLIC BLOOD PRESSURE: 180 MMHG | HEIGHT: 71 IN | TEMPERATURE: 97.6 F | OXYGEN SATURATION: 98 % | HEART RATE: 58 BPM | BODY MASS INDEX: 30.28 KG/M2 | DIASTOLIC BLOOD PRESSURE: 83 MMHG | RESPIRATION RATE: 18 BRPM | WEIGHT: 216.27 LBS

## 2022-06-19 RX ADMIN — GABAPENTIN 100 MG: 100 CAPSULE ORAL at 09:20

## 2022-06-19 RX ADMIN — PREDNISONE 10 MG: 10 TABLET ORAL at 09:20

## 2022-06-19 RX ADMIN — MEMANTINE 10 MG: 10 TABLET ORAL at 09:20

## 2022-06-19 NOTE — ED NOTES
Patient sleeping in stretcher  No distressed noted  Lights dimmed and Call button within reach  Will reassess patients vitals when awake        Malathi Flood RN  06/19/22 2276

## 2022-06-19 NOTE — ED NOTES
RN contacted [employer] to check transportation time  No pickup time yet for patient  Marika Livers from [facility] will notify charge when pickup time is assigned        Karime Beyer RN  06/19/22 0188

## 2022-06-19 NOTE — ED NOTES
Patient currently in bed w/ lights dimmed for comfort  RN attempted vital signs to assess patient  Patient refusing vital signs at this time/ patient wants to sleep  [place] provided to patient and call bell at bedside        Lupis Jones RN  06/18/22 9945

## 2022-06-19 NOTE — ED NOTES
Report attempt to H[facility]ctor Falcon - unable to leave message, voicemail box not set up     Priscilla Granados RN  06/19/22 1269

## 2022-06-19 NOTE — ED NOTES
Patient incontinent - large bm; linens changed, patient cleaned up     Carmen Thomason RN  06/19/22 3826

## 2022-06-19 NOTE — ED NOTES
Patient sitting on side of bed after 1 incontinence occurrence  Patient cleaned and sheets changed  Patient repositioned in clean bed and provided w/ pudding and coffee  Lights dimmed for comfort and patient provided with call button        Janelle Panchal RN  06/18/22 4874

## 2022-06-19 NOTE — ED NOTES
Telephone call to [facility] for transport  No pickup time for patient as of yet  A/w call back        Jamia Menendez RN  06/19/22 4698

## 2022-06-19 NOTE — ED NOTES
Patient resting comfortably in bed  Provided w/ new linens and repositioned in bed        Gloria Lau RN  06/19/22 0600

## 2022-06-19 NOTE — ED NOTES
Patient assisted to restroom   Patient has unsteady gait and requires hand held assistance w/ ambulation     Ryan Garcia RN  06/18/22 4311

## 2022-06-19 NOTE — ED NOTES
Per Olamide De if we could give her a call back with a transport time for patients return @ [phone]       Lu Vicente  06/18/22 8832

## 2022-06-19 NOTE — ED NOTES
Transfer Information:    Ambulance Squad: [facility]ol[facility] Lyons Time: 1330   Destination:    Accepting Physician:    Report Number:         Gabo Krause RN  06/19/22 2044

## 2022-06-19 NOTE — ED NOTES
Patient still sleeping comfortably in stretcher  Will continue to monitor  Call bell within reach        Josse Oakley RN  06/19/22 5564

## 2022-08-04 ENCOUNTER — NURSING HOME VISIT (OUTPATIENT)
Dept: FAMILY MEDICINE CLINIC | Facility: CLINIC | Age: 87
End: 2022-08-04
Payer: COMMERCIAL

## 2022-08-04 DIAGNOSIS — L30.9 DERMATITIS: Primary | ICD-10-CM

## 2022-08-04 DIAGNOSIS — F03.911 AGITATION DUE TO DEMENTIA: ICD-10-CM

## 2022-08-04 PROCEDURE — 99336 PR DOM/R-HOME E/M EST PT MOD HI SEVERITY 40 MINUTES: CPT | Performed by: FAMILY MEDICINE

## 2022-08-13 ENCOUNTER — APPOINTMENT (EMERGENCY)
Dept: RADIOLOGY | Facility: HOSPITAL | Age: 87
DRG: 690 | End: 2022-08-13
Payer: COMMERCIAL

## 2022-08-13 ENCOUNTER — HOSPITAL ENCOUNTER (INPATIENT)
Facility: HOSPITAL | Age: 87
LOS: 6 days | Discharge: NURSING, REHAB OR LONG-TERM CARE | DRG: 690 | End: 2022-08-20
Attending: EMERGENCY MEDICINE | Admitting: INTERNAL MEDICINE
Payer: COMMERCIAL

## 2022-08-13 DIAGNOSIS — E83.42 HYPOMAGNESEMIA: ICD-10-CM

## 2022-08-13 DIAGNOSIS — M05.9 RHEUMATOID ARTHRITIS WITH POSITIVE RHEUMATOID FACTOR, INVOLVING UNSPECIFIED SITE (HCC): ICD-10-CM

## 2022-08-13 DIAGNOSIS — N39.0 UTI (URINARY TRACT INFECTION): Primary | ICD-10-CM

## 2022-08-13 DIAGNOSIS — S31.809A WOUND OF BUTTOCK, UNSPECIFIED LATERALITY, INITIAL ENCOUNTER: ICD-10-CM

## 2022-08-13 PROBLEM — E87.0 HYPERNATREMIA: Status: ACTIVE | Noted: 2022-08-13

## 2022-08-13 LAB
2HR DELTA HS TROPONIN: -5 NG/L
4HR DELTA HS TROPONIN: -6 NG/L
ALBUMIN SERPL BCP-MCNC: 3.2 G/DL (ref 3.5–5)
ALP SERPL-CCNC: 52 U/L (ref 34–104)
ALT SERPL W P-5'-P-CCNC: 9 U/L (ref 7–52)
ANION GAP SERPL CALCULATED.3IONS-SCNC: 6 MMOL/L (ref 4–13)
APTT PPP: 32 SECONDS (ref 23–37)
AST SERPL W P-5'-P-CCNC: 14 U/L (ref 13–39)
BACTERIA UR QL AUTO: ABNORMAL /HPF
BASOPHILS # BLD AUTO: 0.02 THOUSANDS/ΜL (ref 0–0.1)
BASOPHILS NFR BLD AUTO: 0 % (ref 0–1)
BILIRUB SERPL-MCNC: 0.37 MG/DL (ref 0.2–1)
BILIRUB UR QL STRIP: NEGATIVE
BUN SERPL-MCNC: 44 MG/DL (ref 5–25)
CALCIUM ALBUM COR SERPL-MCNC: 8.8 MG/DL (ref 8.3–10.1)
CALCIUM SERPL-MCNC: 8.2 MG/DL (ref 8.4–10.2)
CARDIAC TROPONIN I PNL SERPL HS: 34 NG/L
CARDIAC TROPONIN I PNL SERPL HS: 35 NG/L
CARDIAC TROPONIN I PNL SERPL HS: 40 NG/L
CHLORIDE SERPL-SCNC: 113 MMOL/L (ref 96–108)
CLARITY UR: CLEAR
CO2 SERPL-SCNC: 27 MMOL/L (ref 21–32)
COLOR UR: YELLOW
CREAT SERPL-MCNC: 1.86 MG/DL (ref 0.6–1.3)
EOSINOPHIL # BLD AUTO: 0.1 THOUSAND/ΜL (ref 0–0.61)
EOSINOPHIL NFR BLD AUTO: 1 % (ref 0–6)
ERYTHROCYTE [DISTWIDTH] IN BLOOD BY AUTOMATED COUNT: 13.8 % (ref 11.6–15.1)
FLUAV RNA RESP QL NAA+PROBE: NEGATIVE
FLUBV RNA RESP QL NAA+PROBE: NEGATIVE
GFR SERPL CREATININE-BSD FRML MDRD: 31 ML/MIN/1.73SQ M
GLUCOSE SERPL-MCNC: 120 MG/DL (ref 65–140)
GLUCOSE UR STRIP-MCNC: NEGATIVE MG/DL
HCT VFR BLD AUTO: 37.3 % (ref 36.5–49.3)
HGB BLD-MCNC: 12 G/DL (ref 12–17)
HGB UR QL STRIP.AUTO: ABNORMAL
IMM GRANULOCYTES # BLD AUTO: 0.08 THOUSAND/UL (ref 0–0.2)
IMM GRANULOCYTES NFR BLD AUTO: 1 % (ref 0–2)
INR PPP: 0.96 (ref 0.84–1.19)
KETONES UR STRIP-MCNC: NEGATIVE MG/DL
LACTATE SERPL-SCNC: 1.8 MMOL/L (ref 0.5–2)
LEUKOCYTE ESTERASE UR QL STRIP: ABNORMAL
LYMPHOCYTES # BLD AUTO: 0.88 THOUSANDS/ΜL (ref 0.6–4.47)
LYMPHOCYTES NFR BLD AUTO: 11 % (ref 14–44)
MAGNESIUM SERPL-MCNC: 1.8 MG/DL (ref 1.9–2.7)
MCH RBC QN AUTO: 32.1 PG (ref 26.8–34.3)
MCHC RBC AUTO-ENTMCNC: 32.2 G/DL (ref 31.4–37.4)
MCV RBC AUTO: 100 FL (ref 82–98)
MONOCYTES # BLD AUTO: 0.44 THOUSAND/ΜL (ref 0.17–1.22)
MONOCYTES NFR BLD AUTO: 6 % (ref 4–12)
NEUTROPHILS # BLD AUTO: 6.28 THOUSANDS/ΜL (ref 1.85–7.62)
NEUTS SEG NFR BLD AUTO: 81 % (ref 43–75)
NITRITE UR QL STRIP: POSITIVE
NON-SQ EPI CELLS URNS QL MICRO: ABNORMAL /HPF
NRBC BLD AUTO-RTO: 0 /100 WBCS
PH UR STRIP.AUTO: 5.5 [PH] (ref 4.5–8)
PLATELET # BLD AUTO: 177 THOUSANDS/UL (ref 149–390)
PMV BLD AUTO: 10.3 FL (ref 8.9–12.7)
POTASSIUM SERPL-SCNC: 4.6 MMOL/L (ref 3.5–5.3)
PROT SERPL-MCNC: 5.6 G/DL (ref 6.4–8.4)
PROT UR STRIP-MCNC: ABNORMAL MG/DL
PROTHROMBIN TIME: 12.9 SECONDS (ref 11.6–14.5)
RBC # BLD AUTO: 3.74 MILLION/UL (ref 3.88–5.62)
RBC #/AREA URNS AUTO: ABNORMAL /HPF
RSV RNA RESP QL NAA+PROBE: NEGATIVE
SARS-COV-2 RNA RESP QL NAA+PROBE: NEGATIVE
SODIUM SERPL-SCNC: 146 MMOL/L (ref 135–147)
SP GR UR STRIP.AUTO: 1.02 (ref 1–1.03)
TSH SERPL DL<=0.05 MIU/L-ACNC: 1.77 UIU/ML (ref 0.45–4.5)
UROBILINOGEN UR QL STRIP.AUTO: 0.2 E.U./DL
WBC # BLD AUTO: 7.8 THOUSAND/UL (ref 4.31–10.16)
WBC #/AREA URNS AUTO: ABNORMAL /HPF
WBC CLUMPS # UR AUTO: PRESENT /UL

## 2022-08-13 PROCEDURE — 83735 ASSAY OF MAGNESIUM: CPT | Performed by: PHYSICIAN ASSISTANT

## 2022-08-13 PROCEDURE — 83605 ASSAY OF LACTIC ACID: CPT | Performed by: PHYSICIAN ASSISTANT

## 2022-08-13 PROCEDURE — 85610 PROTHROMBIN TIME: CPT | Performed by: PHYSICIAN ASSISTANT

## 2022-08-13 PROCEDURE — 87040 BLOOD CULTURE FOR BACTERIA: CPT | Performed by: PHYSICIAN ASSISTANT

## 2022-08-13 PROCEDURE — 87186 SC STD MICRODIL/AGAR DIL: CPT

## 2022-08-13 PROCEDURE — 87154 CUL TYP ID BLD PTHGN 6+ TRGT: CPT | Performed by: PHYSICIAN ASSISTANT

## 2022-08-13 PROCEDURE — 71045 X-RAY EXAM CHEST 1 VIEW: CPT

## 2022-08-13 PROCEDURE — 87077 CULTURE AEROBIC IDENTIFY: CPT

## 2022-08-13 PROCEDURE — 36415 COLL VENOUS BLD VENIPUNCTURE: CPT | Performed by: PHYSICIAN ASSISTANT

## 2022-08-13 PROCEDURE — 96361 HYDRATE IV INFUSION ADD-ON: CPT

## 2022-08-13 PROCEDURE — 93005 ELECTROCARDIOGRAM TRACING: CPT

## 2022-08-13 PROCEDURE — 84484 ASSAY OF TROPONIN QUANT: CPT | Performed by: PHYSICIAN ASSISTANT

## 2022-08-13 PROCEDURE — 85025 COMPLETE CBC W/AUTO DIFF WBC: CPT | Performed by: PHYSICIAN ASSISTANT

## 2022-08-13 PROCEDURE — 87086 URINE CULTURE/COLONY COUNT: CPT

## 2022-08-13 PROCEDURE — 96375 TX/PRO/DX INJ NEW DRUG ADDON: CPT

## 2022-08-13 PROCEDURE — 99285 EMERGENCY DEPT VISIT HI MDM: CPT

## 2022-08-13 PROCEDURE — 81001 URINALYSIS AUTO W/SCOPE: CPT

## 2022-08-13 PROCEDURE — 99220 PR INITIAL OBSERVATION CARE/DAY 70 MINUTES: CPT | Performed by: HOSPITALIST

## 2022-08-13 PROCEDURE — 99285 EMERGENCY DEPT VISIT HI MDM: CPT | Performed by: PHYSICIAN ASSISTANT

## 2022-08-13 PROCEDURE — 0241U HB NFCT DS VIR RESP RNA 4 TRGT: CPT | Performed by: PHYSICIAN ASSISTANT

## 2022-08-13 PROCEDURE — 85730 THROMBOPLASTIN TIME PARTIAL: CPT | Performed by: PHYSICIAN ASSISTANT

## 2022-08-13 PROCEDURE — 80053 COMPREHEN METABOLIC PANEL: CPT | Performed by: PHYSICIAN ASSISTANT

## 2022-08-13 PROCEDURE — 96366 THER/PROPH/DIAG IV INF ADDON: CPT

## 2022-08-13 PROCEDURE — 96365 THER/PROPH/DIAG IV INF INIT: CPT

## 2022-08-13 PROCEDURE — 84443 ASSAY THYROID STIM HORMONE: CPT | Performed by: PHYSICIAN ASSISTANT

## 2022-08-13 RX ORDER — GABAPENTIN 100 MG/1
100 CAPSULE ORAL 2 TIMES DAILY
COMMUNITY

## 2022-08-13 RX ORDER — GABAPENTIN 100 MG/1
100 CAPSULE ORAL 2 TIMES DAILY
Status: DISCONTINUED | OUTPATIENT
Start: 2022-08-13 | End: 2022-08-20 | Stop reason: HOSPADM

## 2022-08-13 RX ORDER — SODIUM CHLORIDE 9 MG/ML
125 INJECTION, SOLUTION INTRAVENOUS CONTINUOUS
Status: DISCONTINUED | OUTPATIENT
Start: 2022-08-13 | End: 2022-08-13

## 2022-08-13 RX ORDER — CEFEPIME HYDROCHLORIDE 2 G/50ML
2000 INJECTION, SOLUTION INTRAVENOUS ONCE
Status: COMPLETED | OUTPATIENT
Start: 2022-08-13 | End: 2022-08-13

## 2022-08-13 RX ORDER — SODIUM CHLORIDE 450 MG/100ML
100 INJECTION, SOLUTION INTRAVENOUS CONTINUOUS
Status: DISCONTINUED | OUTPATIENT
Start: 2022-08-13 | End: 2022-08-14

## 2022-08-13 RX ORDER — CITALOPRAM 20 MG/1
20 TABLET ORAL DAILY
Status: DISCONTINUED | OUTPATIENT
Start: 2022-08-14 | End: 2022-08-20 | Stop reason: HOSPADM

## 2022-08-13 RX ORDER — CLOPIDOGREL BISULFATE 75 MG/1
75 TABLET ORAL DAILY
Status: DISCONTINUED | OUTPATIENT
Start: 2022-08-14 | End: 2022-08-20 | Stop reason: HOSPADM

## 2022-08-13 RX ORDER — QUETIAPINE FUMARATE 100 MG/1
100 TABLET, FILM COATED ORAL
Status: DISCONTINUED | OUTPATIENT
Start: 2022-08-13 | End: 2022-08-20 | Stop reason: HOSPADM

## 2022-08-13 RX ORDER — METOPROLOL TARTRATE 50 MG/1
50 TABLET, FILM COATED ORAL DAILY
Status: DISCONTINUED | OUTPATIENT
Start: 2022-08-14 | End: 2022-08-20 | Stop reason: HOSPADM

## 2022-08-13 RX ORDER — ONDANSETRON 2 MG/ML
4 INJECTION INTRAMUSCULAR; INTRAVENOUS EVERY 6 HOURS PRN
Status: DISCONTINUED | OUTPATIENT
Start: 2022-08-13 | End: 2022-08-20 | Stop reason: HOSPADM

## 2022-08-13 RX ORDER — ACETAMINOPHEN 325 MG/1
650 TABLET ORAL EVERY 6 HOURS PRN
Status: DISCONTINUED | OUTPATIENT
Start: 2022-08-13 | End: 2022-08-20 | Stop reason: HOSPADM

## 2022-08-13 RX ORDER — MAGNESIUM SULFATE 1 G/100ML
1 INJECTION INTRAVENOUS ONCE
Status: COMPLETED | OUTPATIENT
Start: 2022-08-13 | End: 2022-08-13

## 2022-08-13 RX ORDER — CEFTRIAXONE 1 G/50ML
1000 INJECTION, SOLUTION INTRAVENOUS EVERY 24 HOURS
Status: DISCONTINUED | OUTPATIENT
Start: 2022-08-14 | End: 2022-08-14

## 2022-08-13 RX ORDER — ENOXAPARIN SODIUM 100 MG/ML
40 INJECTION SUBCUTANEOUS DAILY
Status: DISCONTINUED | OUTPATIENT
Start: 2022-08-14 | End: 2022-08-14 | Stop reason: DRUGHIGH

## 2022-08-13 RX ORDER — PREDNISONE 10 MG/1
10 TABLET ORAL DAILY
Status: DISCONTINUED | OUTPATIENT
Start: 2022-08-14 | End: 2022-08-20 | Stop reason: HOSPADM

## 2022-08-13 RX ORDER — DONEPEZIL HYDROCHLORIDE 5 MG/1
10 TABLET, FILM COATED ORAL
Status: DISCONTINUED | OUTPATIENT
Start: 2022-08-13 | End: 2022-08-20 | Stop reason: HOSPADM

## 2022-08-13 RX ADMIN — QUETIAPINE FUMARATE 100 MG: 100 TABLET ORAL at 21:22

## 2022-08-13 RX ADMIN — SODIUM CHLORIDE 1000 ML: 0.9 INJECTION, SOLUTION INTRAVENOUS at 13:18

## 2022-08-13 RX ADMIN — SODIUM CHLORIDE 100 ML/HR: 0.45 INJECTION, SOLUTION INTRAVENOUS at 21:24

## 2022-08-13 RX ADMIN — SODIUM CHLORIDE 125 ML/HR: 0.9 INJECTION, SOLUTION INTRAVENOUS at 18:30

## 2022-08-13 RX ADMIN — GABAPENTIN 100 MG: 100 CAPSULE ORAL at 21:22

## 2022-08-13 RX ADMIN — CEFEPIME HYDROCHLORIDE 2000 MG: 2 INJECTION, SOLUTION INTRAVENOUS at 18:18

## 2022-08-13 RX ADMIN — MAGNESIUM SULFATE HEPTAHYDRATE 1 G: 1 INJECTION, SOLUTION INTRAVENOUS at 15:32

## 2022-08-13 RX ADMIN — DONEPEZIL HYDROCHLORIDE 10 MG: 5 TABLET ORAL at 21:22

## 2022-08-13 NOTE — ED PROVIDER NOTES
History  Chief Complaint   Patient presents with    Weakness - Generalized     Pt arrives by EMS from dementia unit of [facility]  was concerned about pt having blue fingers  Pt has no SOB or cyanosis during triage  Pt has no further complaints at this time  Patient was sent from the nursing home with blue discoloration to his fingers  Patient is oriented to his name and location  He is disoriented to time  He has no complaints  He states that nothing hurts him  He denies any chest pain or shortness of breath  He denies any abdominal pain, black tarry stools, bright red blood in his rectum  He denies any urinary complaints of dysuria, frequency, urgency, hematuria  He denies any fever chills  He denies any nasal congestion or postnasal drip  He denies any coughing or sore throat  He denies feeling weak  He is not a great historian as he has a history of dementia  Differential diagnosis includes but is not limited to sepsis, hypotension secondary to poor cardiac output, dehydration, anemia  Past medical history is positive for anxiety, benign prostate hyperplasia, dementia, neuropathy, rheumatoid arthritis      History provided by:  Patient and EMS personnel  History limited by:  Dementia  Fatigue  Severity:  Mild  Onset quality:  Gradual  Duration:  1 day  Timing:  Constant  Progression:  Unchanged  Chronicity:  New  Context: dehydration and increased activity    Relieved by:  Nothing  Worsened by:   Activity and standing  Ineffective treatments:  None tried  Associated symptoms: lethargy    Associated symptoms: no abdominal pain, no anorexia, no aphasia, no arthralgias, no ataxia, no chest pain, no cough, no diarrhea, no difficulty walking, no dizziness, no drooling, no dysphagia, no dysuria, no numbness in extremities, no falls, no fever, no foul-smelling urine, no frequency, no headaches, no hematochezia, no loss of consciousness, no melena, no myalgias, no nausea, no near-syncope, no seizures, no sensory-motor deficit, no shortness of breath, no stroke symptoms, no syncope, no urgency, no vision change and no vomiting    Risk factors: no anemia, no congestive heart failure, no coronary artery disease, no diabetes, no excessive menstruation, no family hx of stroke, no heart disease, no neurologic disease, no new medications and no recent stressors        Prior to Admission Medications   Prescriptions Last Dose Informant Patient Reported? Taking? QUEtiapine (SEROquel) 50 mg tablet   No No   Sig: Take 1 tablet (50 mg total) by mouth daily at bedtime   Patient taking differently: Take 100 mg by mouth daily at bedtime   citalopram (CeleXA) 20 mg tablet   Yes No   Sig: Take 1 tablet by mouth daily   clopidogrel (Plavix) 75 mg tablet   Yes No   Sig: Take 1 tablet by mouth daily   donepezil (ARICEPT) 10 mg tablet   No No   Sig: Take 1 tablet (10 mg total) by mouth daily at bedtime   gabapentin (NEURONTIN) 100 mg capsule   Yes Yes   Sig: Take 100 mg by mouth 2 (two) times a day   metoprolol tartrate (LOPRESSOR) 50 mg tablet   Yes No   Sig: Take 1 tablet by mouth daily   predniSONE 20 mg tablet   Yes No   Sig: Take 10 mg by mouth daily      Facility-Administered Medications: None       Past Medical History:   Diagnosis Date    Anxiety     Benign prostate hyperplasia     Dementia (HCC)     Neuropathy     [facility]        History reviewed  No pertinent surgical history  History reviewed  No pertinent family history  I have reviewed and agree with the history as documented  E-Cigarette/Vaping    E-Cigarette Use Never User      E-Cigarette/Vaping Substances     Social History     Tobacco Use    Smoking status: Never Smoker    Smokeless tobacco: Never Used   Vaping Use    Vaping Use: Never used   Substance Use Topics    Alcohol use: Not Currently    Drug use: Never       Review of Systems   Constitutional: Positive for activity change and fatigue  Negative for appetite change, chills, diaphoresis and fever  HENT: Negative for congestion, drooling, ear discharge, ear pain, mouth sores, postnasal drip, rhinorrhea and sore throat  Eyes: Negative for discharge and redness  Respiratory: Negative for cough, chest tightness, shortness of breath and wheezing  Cardiovascular: Negative for chest pain, syncope and near-syncope  Gastrointestinal: Negative for abdominal pain, anal bleeding, anorexia, blood in stool, constipation, diarrhea, dysphagia, hematochezia, melena, nausea and vomiting  Genitourinary: Negative for dysuria, frequency and urgency  Musculoskeletal: Negative for arthralgias, falls and myalgias  Skin: Positive for color change  Negative for pallor and rash  Neurological: Negative for dizziness, seizures, loss of consciousness and headaches  Psychiatric/Behavioral: Positive for confusion  All other systems reviewed and are negative  Physical Exam  Physical Exam  Vitals and nursing note reviewed  Constitutional:       General: He is not in acute distress  Appearance: Normal appearance  He is normal weight  He is not ill-appearing, toxic-appearing or diaphoretic  HENT:      Head: Normocephalic  Right Ear: External ear normal       Left Ear: External ear normal       Nose: Nose normal       Mouth/Throat:      Mouth: Mucous membranes are dry  Pharynx: No oropharyngeal exudate  Eyes:      General:         Right eye: No discharge  Left eye: No discharge  Conjunctiva/sclera: Conjunctivae normal    Cardiovascular:      Rate and Rhythm: Normal rate and regular rhythm  Heart sounds: Normal heart sounds  No murmur heard  Pulmonary:      Effort: Pulmonary effort is normal       Breath sounds: Normal breath sounds  Abdominal:      General: Abdomen is flat  There is no distension  Tenderness: There is no abdominal tenderness  There is no guarding or rebound     Musculoskeletal:      Cervical back: Neck supple  No rigidity  Lymphadenopathy:      Cervical: No cervical adenopathy  Skin:     General: Skin is warm  Capillary Refill: Capillary refill takes less than 2 seconds  Coloration: Skin is pale  Findings: No bruising, erythema or rash  Neurological:      Mental Status: He is alert and oriented to person, place, and time  Psychiatric:         Mood and Affect: Mood normal          Behavior: Behavior normal          Thought Content:  Thought content normal          Judgment: Judgment normal          Vital Signs  ED Triage Vitals   Temperature Pulse Respirations Blood Pressure SpO2   08/13/22 1156 08/13/22 1156 08/13/22 1156 08/13/22 1156 08/13/22 1156   98 2 °F (36 8 °C) 61 20 99/52 95 %      Temp Source Heart Rate Source Patient Position - Orthostatic VS BP Location FiO2 (%)   08/13/22 1156 08/13/22 1156 08/13/22 1156 08/13/22 1156 --   Oral Monitor Sitting Left arm       Pain Score       08/13/22 1538       No Pain           Vitals:    08/14/22 0049 08/14/22 0947 08/14/22 1430 08/14/22 1559   BP: (!) 173/74 169/79 165/72 143/65   Pulse: 70 63 65 55   Patient Position - Orthostatic VS: Lying   Lying         Visual Acuity      ED Medications  Medications   citalopram (CeleXA) tablet 20 mg (20 mg Oral Given 8/14/22 1032)   clopidogrel (PLAVIX) tablet 75 mg (75 mg Oral Given 8/14/22 0947)   donepezil (ARICEPT) tablet 10 mg (10 mg Oral Given 8/13/22 2122)   metoprolol tartrate (LOPRESSOR) tablet 50 mg (50 mg Oral Given 8/14/22 0947)   predniSONE tablet 10 mg (10 mg Oral Given 8/14/22 0947)   gabapentin (NEURONTIN) capsule 100 mg (100 mg Oral Given 8/14/22 0947)   QUEtiapine (SEROquel) tablet 100 mg (100 mg Oral Given 8/13/22 2122)   ondansetron (ZOFRAN) injection 4 mg (has no administration in time range)   acetaminophen (TYLENOL) tablet 650 mg (has no administration in time range)   enoxaparin (LOVENOX) subcutaneous injection 30 mg (30 mg Subcutaneous Given 8/14/22 0948)   cefTRIAXone (ROCEPHIN) IVPB (premix in dextrose) 1,000 mg 50 mL (0 mg Intravenous Stopped 8/14/22 1030)   sodium chloride 0 9 % bolus 1,000 mL (0 mL Intravenous Stopped 8/13/22 1418)   magnesium sulfate IVPB (premix) SOLN 1 g (0 g Intravenous Stopped 8/13/22 1821)   cefepime (MAXIPIME) IVPB (premix in dextrose) 2,000 mg 50 mL (0 mg Intravenous Stopped 8/13/22 1908)       Diagnostic Studies  Results Reviewed     Procedure Component Value Units Date/Time    Blood culture [[id number]]     Lab Status: No result Specimen: Blood     Blood culture [[id number]]     Lab Status: No result Specimen: Blood     Blood culture #2 [[id number]]  (Abnormal) Collected: 08/13/22 1318    Lab Status: Preliminary result Specimen: Blood from Arm, Right Updated: 08/14/22 1511     Gram Stain Result Gram positive cocci in clusters    Blood Culture Identification Panel [[id number]] Collected: 08/13/22 1318    Lab Status:  In process Specimen: Blood from Arm, Right Updated: 08/14/22 7700    Basic metabolic panel [[id number]]  (Abnormal) Collected: 08/14/22 0724    Lab Status: Final result Specimen: Blood from Line Updated: 08/14/22 0755     Sodium 142 mmol/L      Potassium 4 3 mmol/L      Chloride 113 mmol/L      CO2 25 mmol/L      ANION GAP 4 mmol/L      BUN 34 mg/dL      Creatinine 1 61 mg/dL      Glucose 81 mg/dL      Calcium 7 7 mg/dL      eGFR 37 ml/min/1 73sq m     Narrative:      [place] guidelines for Chronic Kidney Disease (CKD):     Stage 1 with normal or high GFR (GFR > 90 mL/min/1 73 square meters)    Stage 2 Mild CKD (GFR = 60-89 mL/min/1 73 square meters)    Stage 3A Moderate CKD (GFR = 45-59 mL/min/1 73 square meters)    Stage 3B Moderate CKD (GFR = 30-44 mL/min/1 73 square meters)    Stage 4 Severe CKD (GFR = 15-29 mL/min/1 73 square meters)    Stage 5 End Stage CKD (GFR <15 mL/min/1 73 square meters)  Note: GFR calculation is accurate only with a steady state creatinine    CBC and differential [[id number]] (Abnormal) Collected: 08/14/22 0724    Lab Status: Final result Specimen: Blood from Line Updated: 08/14/22 0736     WBC 5 40 Thousand/uL      RBC 3 26 Million/uL      Hemoglobin 10 5 g/dL      Hematocrit 32 5 %       fL      MCH 32 2 pg      MCHC 32 3 g/dL      RDW 13 8 %      MPV 10 0 fL      Platelets 733 Thousands/uL      nRBC 0 /100 WBCs      Neutrophils Relative 50 %      Immat GRANS % 1 %      Lymphocytes Relative 34 %      Monocytes Relative 10 %      Eosinophils Relative 5 %      Basophils Relative 0 %      Neutrophils Absolute 2 68 Thousands/µL      Immature Grans Absolute 0 06 Thousand/uL      Lymphocytes Absolute 1 85 Thousands/µL      Monocytes Absolute 0 52 Thousand/µL      Eosinophils Absolute 0 27 Thousand/µL      Basophils Absolute 0 02 Thousands/µL     Blood culture #1 [[id number]] Collected: 08/13/22 1353    Lab Status: Preliminary result Specimen: Blood from Arm, Left Updated: 08/13/22 2003     Blood Culture Received in Microbiology Lab  Culture in Progress  HS Troponin I 4hr [[id number]]  (Normal) Collected: 08/13/22 1925    Lab Status: Final result Specimen: Blood from Arm, Right Updated: 08/13/22 1955     hs TnI 4hr 34 ng/L      Delta 4hr hsTnI -6 ng/L     Urine Microscopic [[id number]]  (Abnormal) Collected: 08/13/22 1732    Lab Status: Final result Specimen: Urine, Indwelling Fountain Catheter Updated: 08/13/22 1809     RBC, UA 1-2 /hpf      WBC, UA 30-50 /hpf      Epithelial Cells None Seen /hpf      Bacteria, UA Innumerable /hpf      WBC Clumps Present    Urine culture [[id number]] Collected: 08/13/22 1732    Lab Status:  In process Specimen: Urine, Indwelling Fountain Catheter Updated: 08/13/22 1809    Urine Macroscopic, POC [[id number]]  (Abnormal) Collected: 08/13/22 1732    Lab Status: Final result Specimen: Urine Updated: 08/13/22 1733     Color, UA Yellow     Clarity, UA Clear     pH, UA 5 5     Leukocytes, UA Small     Nitrite, UA Positive     Protein, UA Trace mg/dl      Glucose, UA Negative mg/dl      Ketones, UA Negative mg/dl      Urobilinogen, UA 0 2 E U /dl      Bilirubin, UA Negative     Occult Blood, UA Trace     Specific Parker, UA 1 025    Narrative:      CLINITEK RESULT    HS Troponin I 2hr [[id number]]  (Normal) Collected: 08/13/22 1535    Lab Status: Final result Specimen: Blood from Arm, Right Updated: 08/13/22 1614     hs TnI 2hr 35 ng/L      Delta 2hr hsTnI -5 ng/L     FLU/RSV/COVID - if FLU/RSV clinically relevant [[id number]]  (Normal) Collected: 08/13/22 1318    Lab Status: Final result Specimen: Nares from Nose Updated: 08/13/22 1432     SARS-CoV-2 Negative     INFLUENZA A PCR Negative     INFLUENZA B PCR Negative     RSV PCR Negative    Narrative:      FOR PEDIATRIC PATIENTS - copy/paste COVID Guidelines URL to browser: [email or web address]    SARS-CoV-2 assay is a Nucleic Acid Amplification assay intended for the  qualitative detection of nucleic acid from SARS-CoV-2 in nasopharyngeal  swabs  Results are for the presumptive identification of SARS-CoV-2 RNA  Positive results are indicative of infection with SARS-CoV-2, the virus  causing COVID-19, but do not rule out bacterial infection or co-infection  with other viruses  Laboratories within the [place] and its  territories are required to report all positive results to the appropriate  public health authorities  Negative results do not preclude SARS-CoV-2  infection and should not be used as the sole basis for treatment or other  patient management decisions  Negative results must be combined with  clinical observations, patient history, and epidemiological information  This test has not been FDA cleared or approved  This test has been authorized by FDA under an Emergency Use Authorization  (EUA)   This test is only authorized for the duration of time the  declaration that circumstances exist justifying the authorization of the  emergency use of an in vitro diagnostic tests for detection of SARS-CoV-2  virus and/or diagnosis of COVID-19 infection under section 564(b)(1) of  the Act, 21 U  S C  967GSY-6(M)(3), unless the authorization is terminated  or revoked sooner  The test has been validated but independent review by FDA  and CLIA is pending  Test performed using [employer] Gene[employer]pert: This RT-PCR assay targets N2,  a region unique to SARS-CoV-2  A conserved region in the E-gene was chosen  for pan-Sarbecovirus detection which includes SARS-CoV-2  Lactic acid [[id number]]  (Normal) Collected: 08/13/22 1318    Lab Status: Final result Specimen: Blood from Arm, Right Updated: 08/13/22 1418     LACTIC ACID 1 8 mmol/L     Narrative:      Result may be elevated if tourniquet was used during collection  TSH [[id number]]  (Normal) Collected: 08/13/22 1318    Lab Status: Final result Specimen: Blood from Arm, Right Updated: 08/13/22 1408     TSH 3RD GENERATON 1 767 uIU/mL     Narrative:      Patients undergoing fluorescein dye angiography may retain small amounts of fluorescein in the body for 48-72 hours post procedure  Samples containing fluorescein can produce falsely depressed TSH values  If the patient had this procedure,a specimen should be resubmitted post fluorescein clearance        HS Troponin 0hr (reflex protocol) [[id number]]  (Normal) Collected: 08/13/22 1318    Lab Status: Final result Specimen: Blood from Arm, Right Updated: 08/13/22 1357     hs TnI 0hr 40 ng/L     Comprehensive metabolic panel [[id number]]  (Abnormal) Collected: 08/13/22 1318    Lab Status: Final result Specimen: Blood from Arm, Right Updated: 08/13/22 1349     Sodium 146 mmol/L      Potassium 4 6 mmol/L      Chloride 113 mmol/L      CO2 27 mmol/L      ANION GAP 6 mmol/L      BUN 44 mg/dL      Creatinine 1 86 mg/dL      Glucose 120 mg/dL      Calcium 8 2 mg/dL      Corrected Calcium 8 8 mg/dL      AST 14 U/L      ALT 9 U/L      Alkaline Phosphatase 52 U/L      Total Protein 5 6 g/dL Albumin 3 2 g/dL      Total Bilirubin 0 37 mg/dL      eGFR 31 ml/min/1 73sq m     Narrative:      [place] guidelines for Chronic Kidney Disease (CKD):     Stage 1 with normal or high GFR (GFR > 90 mL/min/1 73 square meters)    Stage 2 Mild CKD (GFR = 60-89 mL/min/1 73 square meters)    Stage 3A Moderate CKD (GFR = 45-59 mL/min/1 73 square meters)    Stage 3B Moderate CKD (GFR = 30-44 mL/min/1 73 square meters)    Stage 4 Severe CKD (GFR = 15-29 mL/min/1 73 square meters)    Stage 5 End Stage CKD (GFR <15 mL/min/1 73 square meters)  Note: GFR calculation is accurate only with a steady state creatinine    Magnesium [[id number]]  (Abnormal) Collected: 08/13/22 1318    Lab Status: Final result Specimen: Blood from Arm, Right Updated: 08/13/22 1349     Magnesium 1 8 mg/dL     Protime-INR [[id number]]  (Normal) Collected: 08/13/22 1318    Lab Status: Final result Specimen: Blood from Arm, Right Updated: 08/13/22 1343     Protime 12 9 seconds      INR 0 96    APTT [[id number]]  (Normal) Collected: 08/13/22 1318    Lab Status: Final result Specimen: Blood from Arm, Right Updated: 08/13/22 1343     PTT 32 seconds     CBC and differential [[id number]]  (Abnormal) Collected: 08/13/22 1318    Lab Status: Final result Specimen: Blood from Arm, Right Updated: 08/13/22 1329     WBC 7 80 Thousand/uL      RBC 3 74 Million/uL      Hemoglobin 12 0 g/dL      Hematocrit 37 3 %       fL      MCH 32 1 pg      MCHC 32 2 g/dL      RDW 13 8 %      MPV 10 3 fL      Platelets 188 Thousands/uL      nRBC 0 /100 WBCs      Neutrophils Relative 81 %      Immat GRANS % 1 %      Lymphocytes Relative 11 %      Monocytes Relative 6 %      Eosinophils Relative 1 %      Basophils Relative 0 %      Neutrophils Absolute 6 28 Thousands/µL      Immature Grans Absolute 0 08 Thousand/uL      Lymphocytes Absolute 0 88 Thousands/µL      Monocytes Absolute 0 44 Thousand/µL      Eosinophils Absolute 0 10 Thousand/µL Basophils Absolute 0 02 Thousands/µL                  XR chest 1 view portable   ED Interpretation by Giovany Babcock PA-C (08/13 1330)   No acute cardiopulmonary disease      Final Result by Carl Cross MD (08/13 1241)      No acute cardiopulmonary disease  Workstation performed: [id number]                    Procedures  ECG 12 Lead Documentation Only    Date/Time: 8/13/2022 1:50 PM  Performed by: Giovany Babcock PA-C  Authorized by: Giovany Babcock PA-C     Indications / Diagnosis:  Ams  ECG reviewed by me, the ED Provider: yes    Patient location:  ED  Previous ECG:     Previous ECG:  Compared to current    Comparison ECG info:  6/18/2022    Similarity:  No change    Comparison to cardiac monitor: Yes    Interpretation:     Interpretation: abnormal    Rate:     ECG rate:  65    ECG rate assessment: normal    Rhythm:     Rhythm: sinus rhythm    Ectopy:     Ectopy: PVCs      PVCs:  Infrequent  QRS:     QRS axis:  Normal    QRS intervals: Wide  Conduction:     Conduction: abnormal      Abnormal conduction: complete RBBB    ST segments:     ST segments:  Normal  T waves:     T waves: normal    Comments:      No signs of acute ischemia    Independently interpreted by me             ED Course  ED Course as of 08/14/22 1723   Sat Aug 13, 2022   1438 Magnesium(!): 1 8   1701 Awaiting urinalysis  Patient states that he does ambulate to the bathroom to urinate is wearing a depends  He has a history of dementia  Mom will plan to straight cath him  Initial Sepsis Screening     Row Name 08/13/22 1330                Is the patient's history suggestive of a new or worsening infection? Yes (Proceed)  -JG        Suspected source of infection urinary tract infection  -JG        Are two or more of the following signs & symptoms of infection both present and new to the patient?  No  -JG        Indicate SIRS criteria --        If the answer is yes to both questions, suspicion of sepsis is present --        If severe sepsis is present AND tissue hypoperfusion perists in the hour after fluid resuscitation or lactate > 4, the patient meets criteria for SEPTIC SHOCK --        Are any of the following organ dysfunction criteria present within 6 hours of suspected infection and SIRS criteria that are NOT considered to be chronic conditions? No  -JG        Organ dysfunction --        Date of presentation of severe sepsis --        Time of presentation of severe sepsis --        Tissue hypoperfusion persists in the hour after crystalloid fluid administration, evidenced, by either: --        Was hypotension present within one hour of the conclusion of crystalloid fluid administration? No  -JG        Date of presentation of septic shock --        Time of presentation of septic shock --              User Key  (r) = Recorded By, (t) = Taken By, (c) = Cosigned By    [facility] [address] Name Provider Type    Mehreen Laird PA-C Physician Assistant                SBIRT 22yo+    Flowsheet Row Most Recent Value   SBIRT (23 yo +)    In order to provide better care to our patients, we are screening all of our patients for alcohol and drug use  Would it be okay to ask you these screening questions? Unable to answer at this time Filed at: 08/13/2022 1531                    MDM  Number of Diagnoses or Management Options  Hypomagnesemia: new and requires workup  UTI (urinary tract infection): new and requires workup  Diagnosis management comments: Patient was sent to the emergency room with bluish discoloration of his fingers and hypotension  He presents to the emergency room with a blood pressure approximately 90/60  He was seen and examined  He did not have some cyanosis to his fingertips  His pressure was 90/60 upon presentation  IV access was established and he was placed on the monitor  His pressure responded well to a L of fluid intravenously  He was seen and examined  Laboratory studies were taken and were reviewed  They were positive for urinary tract infection  The patient received a dose of cefepime, 2000 mg IV  Plan-the patient was admitted to medicine service for observation stay to monitor his blood pressure response to treatment  Amount and/or Complexity of Data Reviewed  Clinical lab tests: ordered and reviewed  Tests in the radiology section of CPT®: ordered and reviewed  Tests in the medicine section of CPT®: ordered and reviewed    Risk of Complications, Morbidity, and/or Mortality  Presenting problems: high  Diagnostic procedures: high  Management options: high    Patient Progress  Patient progress: stable      Disposition  Final diagnoses:   UTI (urinary tract infection)   Hypomagnesemia     Time reflects when diagnosis was documented in both MDM as applicable and the Disposition within this note     Time User Action Codes Description Comment    8/13/2022  6:17 PM Rivera Almazan Add [N39 0] UTI (urinary tract infection)     8/13/2022  8:12 PM Rivera Almazan Add [E83 42] Hypomagnesemia       ED Disposition     ED Disposition   Admit    Condition   Stable    Date/Time   Sat Aug 13, 2022  6:16 PM    Comment   Case was discussed with Dr Le Lake and the patient's admission status was agreed to be Admission Status: observation status to the service of Dr Le Lake                Follow-up Information    None         Current Discharge Medication List      CONTINUE these medications which have NOT CHANGED    Details   gabapentin (NEURONTIN) 100 mg capsule Take 100 mg by mouth 2 (two) times a day      citalopram (CeleXA) 20 mg tablet Take 1 tablet by mouth daily      clopidogrel (Plavix) 75 mg tablet Take 1 tablet by mouth daily      donepezil (ARICEPT) 10 mg tablet Take 1 tablet (10 mg total) by mouth daily at bedtime  Qty: 30 tablet, Refills: 11    Associated Diagnoses: Late onset Alzheimer's disease with behavioral disturbance (HCC)      metoprolol tartrate (LOPRESSOR) 50 mg tablet Take 1 tablet by mouth daily      predniSONE 20 mg tablet Take 10 mg by mouth daily      QUEtiapine (SEROquel) 50 mg tablet Take 1 tablet (50 mg total) by mouth daily at bedtime  Qty: 30 tablet, Refills: 11    Associated Diagnoses: Late onset Alzheimer's disease with behavioral disturbance ([facility] )             No discharge procedures on file      PDMP Review     None          ED Provider  Electronically Signed by           Elly Ramey PA-C  08/14/22 2456

## 2022-08-13 NOTE — H&P
[facility]  H&P- Yen Blair 9/7/1931, 80 y o  male MRN: [id number]  Unit/Bed#: ED 29 Encounter: [id number]  Primary Care Provider: Duane Bhatt MD   Date and time admitted to hospital: 8/13/2022 11:50 AM    UTI (urinary tract infection)  Assessment & Plan  · POA with low BP and "blue fingers"  Pt unable to provide any additional history  Unable to contact facility  · UA with innumberable bacteria and WBCs  · Pt denies LUTS but unreliable  · Reasonable to treat with short course of abx   · Monitor BPs overnight  · IVF     Hypernatremia  Assessment & Plan  · Will give hypotonic IVF overnight  · BMp in the am     Primary hypertension  Assessment & Plan  · BPs initially soft, but now improved  · Okay to resume metoprolol     Late onset Alzheimer's disease with behavioral disturbance (HCC)  Assessment & Plan  · Cont home regimen       VTE Pharmacologic Prophylaxis: VTE Score: 5 High Risk (Score >/= 5) - Pharmacological DVT Prophylaxis Ordered: enoxaparin (Lovenox)  Sequential Compression Devices Ordered  Code Status: FULL   Discussion with family: Updated  (daughter) via phone  Anticipated Length of Stay: Patient will be admitted on an observation basis with an anticipated length of stay of less than 2 midnights secondary to dehydration and UTI   Total Time for Visit, including Counseling / Coordination of Care: 70 minutes Greater than 50% of this total time spent on direct patient counseling and coordination of care  Chief Complaint:  Low BP     History of Present Illness:  Yen Blair is a 80 y o  male with a PMH of history of dementia, CAD who presents with reports of bluish discoloration in his hands from his nursing home  When patient arrived, no obvious discoloration however blood pressure was mildly low  Patient received IV fluids with improvement  Lab workup notable for mild hypernatremia and a UA consistent with infection    Patient denies suprapubic pain or pressure  No dysuria  Has history of dementia an unreliable historian  No fevers or chills, no shortness of breath, nausea or abdominal pain    Review of Systems:  Review of Systems   Unable to perform ROS: Dementia       Past Medical and Surgical History:   Past Medical History:   Diagnosis Date    Anxiety     Benign prostate hyperplasia     [facility] [place]     Neuropathy     RA (rheumatoid arthritis) ([facility] )        History reviewed  No pertinent surgical history  Meds/Allergies:  Prior to Admission medications    Medication Sig Start Date End Date Taking? Authorizing Provider   gabapentin (NEURONTIN) 100 mg capsule Take 100 mg by mouth 2 (two) times a day   Yes Historical Provider, MD   citalopram (CeleXA) 20 mg tablet Take 1 tablet by mouth daily    Historical Provider, MD   clopidogrel (Plavix) 75 mg tablet Take 1 tablet by mouth daily    Historical Provider, MD   donepezil (ARICEPT) 10 mg tablet Take 1 tablet (10 mg total) by mouth daily at bedtime 4/11/21   Debi Cain MD   metoprolol tartrate (LOPRESSOR) 50 mg tablet Take 1 tablet by mouth daily    Historical Provider, MD   predniSONE 20 mg tablet Take 10 mg by mouth daily    Historical Provider, MD   QUEtiapine (SEROquel) 50 mg tablet Take 1 tablet (50 mg total) by mouth daily at bedtime  Patient taking differently: Take 100 mg by mouth daily at bedtime 5/23/21   Debi Cain MD     I have reviewed home medications with a medical source (PCP, Pharmacy, other)  Allergies:    Allergies   Allergen Reactions    Doxycycline GI Intolerance    Levaquin [Levofloxacin] GI Intolerance    Penicillins Edema       Social History:  Marital Status: Single   Occupation:   Patient Pre-hospital Living Situation: [facility]: [facility]   Patient Pre-hospital Level of Mobility: manual wheelchair  Patient Pre-hospital Diet Restrictions:    Substance Use History:   Social History     Substance and Sexual Activity   Alcohol Use Not Currently     Social History     Tobacco Use   Smoking Status Never Smoker   Smokeless Tobacco Never Used     Social History     Substance and Sexual Activity   Drug Use Never       Family History:  History reviewed  No pertinent family history  Physical Exam:     Vitals:   Blood Pressure: 128/64 (08/13/22 1925)  Pulse: 68 (08/13/22 1925)  Temperature: 98 2 °F (36 8 °C) (08/13/22 1156)  Temp Source: Oral (08/13/22 1156)  Respirations: 16 (08/13/22 1925)  SpO2: 95 % (08/13/22 1925)    Physical Exam  Constitutional:       General: He is not in acute distress  Appearance: Normal appearance  He is not ill-appearing  HENT:      Head: Normocephalic and atraumatic  Cardiovascular:      Rate and Rhythm: Normal rate and regular rhythm  Heart sounds: Normal heart sounds  No murmur heard  Pulmonary:      Effort: Pulmonary effort is normal  No respiratory distress  Breath sounds: Normal breath sounds  No wheezing  Abdominal:      General: Abdomen is flat  There is no distension  Palpations: Abdomen is soft  Tenderness: There is no guarding or rebound  Musculoskeletal:         General: Normal range of motion  Right lower leg: No edema  Left lower leg: No edema  Skin:     Coloration: Skin is not jaundiced  Neurological:      General: No focal deficit present  Mental Status: He is alert  Mental status is at baseline     Psychiatric:         Mood and Affect: Mood normal          Behavior: Behavior normal           Additional Data:     Lab Results:  Results from last 7 days   Lab Units 08/13/22  1318   WBC Thousand/uL 7 80   HEMOGLOBIN g/dL 12 0   HEMATOCRIT % 37 3   PLATELETS Thousands/uL 177   NEUTROS PCT % 81*   LYMPHS PCT % 11*   MONOS PCT % 6   EOS PCT % 1     Results from last 7 days   Lab Units 08/13/22  1318   SODIUM mmol/L 146   POTASSIUM mmol/L 4 6   CHLORIDE mmol/L 113*   CO2 mmol/L 27   BUN mg/dL 44*   CREATININE mg/dL 1 86*   ANION GAP mmol/L 6   CALCIUM mg/dL 8 2*   ALBUMIN g/dL 3 2*   TOTAL BILIRUBIN mg/dL 0 37   ALK PHOS U/L 52   ALT U/L 9   AST U/L 14   GLUCOSE RANDOM mg/dL 120     Results from last 7 days   Lab Units 08/13/22  1318   INR  0 96             Results from last 7 days   Lab Units 08/13/22  1318   LACTIC ACID mmol/L 1 8       Imaging: Reviewed radiology reports from this admission including: chest xray  XR chest 1 view portable   ED Interpretation by Jesenia Jacob PA-C (08/13 1330)   No acute cardiopulmonary disease      Final Result by Duyen Rosales MD (08/13 1241)      No acute cardiopulmonary disease  Workstation performed: [id number]             EKG and Other Studies Reviewed on Admission:   · EKG: NSR  HR 65     ** Please Note: This note has been constructed using a voice recognition system   **

## 2022-08-13 NOTE — ASSESSMENT & PLAN NOTE
· POA with low BP and "blue fingers"  Pt unable to provide any additional history  Unable to contact facility  · UA with innumberable bacteria and WBCs  · Pt denies LUTS but unreliable     · Reasonable to treat with short course of abx   · Monitor BPs overnight  · IVF

## 2022-08-13 NOTE — SEPSIS NOTE
Sepsis Note   Luis Perdomo 80 y o  male MRN: [id number]  Unit/Bed#: ED 29 Encounter: [id number]       qSOFA     [address] Name 08/13/22 1648 08/13/22 1538 08/13/22 1322 08/13/22 1156       Altered mental status GCS < 15 -- -- -- --     Respiratory Rate > / =22 0 0 0 0     Systolic BP < / =473 0 0 0 1     Q Sofa Score 0 0 0 1                Initial Sepsis Screening     Row Name 08/13/22 1330                Is the patient's history suggestive of a new or worsening infection? Yes (Proceed)  -JG        Suspected source of infection urinary tract infection  -JG        Are two or more of the following signs & symptoms of infection both present and new to the patient? No  -JG        Indicate SIRS criteria --        If the answer is yes to both questions, suspicion of sepsis is present --        If severe sepsis is present AND tissue hypoperfusion perists in the hour after fluid resuscitation or lactate > 4, the patient meets criteria for SEPTIC SHOCK --        Are any of the following organ dysfunction criteria present within 6 hours of suspected infection and SIRS criteria that are NOT considered to be chronic conditions? No  -JG        Organ dysfunction --        Date of presentation of severe sepsis --        Time of presentation of severe sepsis --        Tissue hypoperfusion persists in the hour after crystalloid fluid administration, evidenced, by either: --        Was hypotension present within one hour of the conclusion of crystalloid fluid administration?  No  -JG        Date of presentation of septic shock --        Time of presentation of septic shock --              User Key  (r) = Recorded By, (t) = Taken By, (c) = Cosigned By    [address] Name Provider Type    Mariusz Bolivar PA-C Physician Assistant

## 2022-08-14 PROBLEM — R78.81 POSITIVE BLOOD CULTURES: Status: ACTIVE | Noted: 2022-08-14

## 2022-08-14 LAB
ANION GAP SERPL CALCULATED.3IONS-SCNC: 4 MMOL/L (ref 4–13)
ATRIAL RATE: 66 BPM
BASOPHILS # BLD AUTO: 0.02 THOUSANDS/ΜL (ref 0–0.1)
BASOPHILS NFR BLD AUTO: 0 % (ref 0–1)
BUN SERPL-MCNC: 34 MG/DL (ref 5–25)
CALCIUM SERPL-MCNC: 7.7 MG/DL (ref 8.4–10.2)
CHLORIDE SERPL-SCNC: 113 MMOL/L (ref 96–108)
CO2 SERPL-SCNC: 25 MMOL/L (ref 21–32)
CREAT SERPL-MCNC: 1.61 MG/DL (ref 0.6–1.3)
EOSINOPHIL # BLD AUTO: 0.27 THOUSAND/ΜL (ref 0–0.61)
EOSINOPHIL NFR BLD AUTO: 5 % (ref 0–6)
ERYTHROCYTE [DISTWIDTH] IN BLOOD BY AUTOMATED COUNT: 13.8 % (ref 11.6–15.1)
GFR SERPL CREATININE-BSD FRML MDRD: 37 ML/MIN/1.73SQ M
GLUCOSE SERPL-MCNC: 81 MG/DL (ref 65–140)
HCT VFR BLD AUTO: 32.5 % (ref 36.5–49.3)
HGB BLD-MCNC: 10.5 G/DL (ref 12–17)
IMM GRANULOCYTES # BLD AUTO: 0.06 THOUSAND/UL (ref 0–0.2)
IMM GRANULOCYTES NFR BLD AUTO: 1 % (ref 0–2)
LYMPHOCYTES # BLD AUTO: 1.85 THOUSANDS/ΜL (ref 0.6–4.47)
LYMPHOCYTES NFR BLD AUTO: 34 % (ref 14–44)
MCH RBC QN AUTO: 32.2 PG (ref 26.8–34.3)
MCHC RBC AUTO-ENTMCNC: 32.3 G/DL (ref 31.4–37.4)
MCV RBC AUTO: 100 FL (ref 82–98)
MONOCYTES # BLD AUTO: 0.52 THOUSAND/ΜL (ref 0.17–1.22)
MONOCYTES NFR BLD AUTO: 10 % (ref 4–12)
NEUTROPHILS # BLD AUTO: 2.68 THOUSANDS/ΜL (ref 1.85–7.62)
NEUTS SEG NFR BLD AUTO: 50 % (ref 43–75)
NRBC BLD AUTO-RTO: 0 /100 WBCS
P AXIS: 40 DEGREES
PLATELET # BLD AUTO: 165 THOUSANDS/UL (ref 149–390)
PMV BLD AUTO: 10 FL (ref 8.9–12.7)
POTASSIUM SERPL-SCNC: 4.3 MMOL/L (ref 3.5–5.3)
PR INTERVAL: 150 MS
QRS AXIS: 87 DEGREES
QRSD INTERVAL: 126 MS
QT INTERVAL: 454 MS
QTC INTERVAL: 472 MS
RBC # BLD AUTO: 3.26 MILLION/UL (ref 3.88–5.62)
SODIUM SERPL-SCNC: 142 MMOL/L (ref 135–147)
T WAVE AXIS: 47 DEGREES
VENTRICULAR RATE: 65 BPM
WBC # BLD AUTO: 5.4 THOUSAND/UL (ref 4.31–10.16)

## 2022-08-14 PROCEDURE — 99233 SBSQ HOSP IP/OBS HIGH 50: CPT | Performed by: PHYSICIAN ASSISTANT

## 2022-08-14 PROCEDURE — 80048 BASIC METABOLIC PNL TOTAL CA: CPT | Performed by: PHYSICIAN ASSISTANT

## 2022-08-14 PROCEDURE — 36415 COLL VENOUS BLD VENIPUNCTURE: CPT | Performed by: PHYSICIAN ASSISTANT

## 2022-08-14 PROCEDURE — 85025 COMPLETE CBC W/AUTO DIFF WBC: CPT | Performed by: PHYSICIAN ASSISTANT

## 2022-08-14 PROCEDURE — 93010 ELECTROCARDIOGRAM REPORT: CPT | Performed by: INTERNAL MEDICINE

## 2022-08-14 PROCEDURE — 87040 BLOOD CULTURE FOR BACTERIA: CPT | Performed by: PHYSICIAN ASSISTANT

## 2022-08-14 RX ORDER — CEFTRIAXONE 1 G/50ML
1000 INJECTION, SOLUTION INTRAVENOUS EVERY 24 HOURS
Status: DISCONTINUED | OUTPATIENT
Start: 2022-08-14 | End: 2022-08-18

## 2022-08-14 RX ORDER — ENOXAPARIN SODIUM 100 MG/ML
30 INJECTION SUBCUTANEOUS DAILY
Status: DISCONTINUED | OUTPATIENT
Start: 2022-08-14 | End: 2022-08-20 | Stop reason: HOSPADM

## 2022-08-14 RX ADMIN — DONEPEZIL HYDROCHLORIDE 10 MG: 5 TABLET ORAL at 22:38

## 2022-08-14 RX ADMIN — CEFTRIAXONE 1000 MG: 1 INJECTION, SOLUTION INTRAVENOUS at 09:48

## 2022-08-14 RX ADMIN — METOPROLOL TARTRATE 50 MG: 50 TABLET, FILM COATED ORAL at 09:47

## 2022-08-14 RX ADMIN — PREDNISONE 10 MG: 10 TABLET ORAL at 09:47

## 2022-08-14 RX ADMIN — ENOXAPARIN SODIUM 30 MG: 30 INJECTION SUBCUTANEOUS at 09:48

## 2022-08-14 RX ADMIN — GABAPENTIN 100 MG: 100 CAPSULE ORAL at 17:25

## 2022-08-14 RX ADMIN — CITALOPRAM HYDROBROMIDE 20 MG: 20 TABLET ORAL at 10:32

## 2022-08-14 RX ADMIN — CLOPIDOGREL BISULFATE 75 MG: 75 TABLET ORAL at 09:47

## 2022-08-14 RX ADMIN — QUETIAPINE FUMARATE 100 MG: 100 TABLET ORAL at 22:38

## 2022-08-14 RX ADMIN — GABAPENTIN 100 MG: 100 CAPSULE ORAL at 09:47

## 2022-08-14 NOTE — ED NOTES
Pt provided with clean linens and gown at this time  Jennifer care performed  Pt has no complaints at this time        Lucero Lui RN  08/14/22 4057

## 2022-08-14 NOTE — ED NOTES
Patient never received breakfast tray - going to deliver another [address], RN  08/14/22 2053

## 2022-08-14 NOTE — UTILIZATION REVIEW
Initial Clinical Review    OBSERVATION 8/13 1819 CHANGED TO INPATIENT 8/14 1537 DUE TO CONTINUED IV ROCEPHIN PENDING URINE CULTURE RESULTS ,  1/2 BLOOD CULTURES POSITIVE for GPC, MONITOR FEVER CURVE AND WBC, BMP     Admission Orders (From admission, onward)     Ordered        08/14/22 1537  Inpatient Admission  Once            08/13/22 1819  Place in Observation  Once                          Admission: Date/Time/Statement:       ED Arrival Information     Expected   -    Arrival   8/13/2022 11:50    Acuity   Urgent            Means of arrival   Ambulance    Escorted by   [facility]   Hospitalist    Admission type   Urgent            Arrival complaint   Sick           Chief Complaint   Patient presents with    Weakness - Generalized     Pt arrives by EMS from dementia unit of [facility]  was concerned about pt having blue fingers  Pt has no SOB or cyanosis during triage  Pt has no further complaints at this time  Initial Presentation: 80 y o  male presents to ED from nursing facility  due to bluish discoloration of hands  Exam in ED finds no obvious discoloration of hands  BP mildly  low   Lab workup notable for mild hypernatremia  Na 146, and a UA consistent with infection  Patient was treated with IV fluids for hypotension in ED  Admitted as Observation to med surg for UTI, hypernatremia  Plan IV antibiotic initiated, Monitor BP, continue hypotonic IVF, recheck BMP in AM, OK to resume metoprolol for primary hypertension, continue home regimen for Alzheimers disease  8/14 CHANGED TO INPATIENT     1/2 admission blood cultures + GPC, Na 146-142 in 24 hrs following hypotonic IVF overnight , hold further IVF to avoid over correction, Continue trend Na on BMP   Exam notes pt oriented to self only     Last data filed at 8/13/2022 2124      Gross per 24 hour   Intake 362 5 ml   Output 300 ml   Net 62 5 ml         ED Triage Vitals   Temperature Pulse Respirations Blood Pressure SpO2   08/13/22 1156 08/13/22 1156 08/13/22 1156 08/13/22 1156 08/13/22 1156   98 2 °F (36 8 °C) 61 20 99/52 95 %      Temp Source Heart Rate Source Patient Position - Orthostatic VS BP Location FiO2 (%)   08/13/22 1156 08/13/22 1156 08/13/22 1156 08/13/22 1156 --   Oral Monitor Sitting Left arm       Pain Score       08/13/22 1538       No Pain          Wt Readings from Last 1 Encounters:   06/18/22 98 1 kg (216 lb 4 3 oz)     Additional Vital Signs:     Date/Time Temp Pulse Resp BP MAP (mmHg) SpO2 O2 Device Patient Position - Orthostatic VS   08/14/22 0049 -- 70 16 173/74 Abnormal  -- 98 % None (Room air) Lying   08/13/22 2300 -- 62 16 119/79 91 95 % None (Room air) Lying   08/13/22 1925 -- 68 16 128/64 91 95 % None (Room air) Lying   08/13/22 1648 -- 69 18 124/63 -- 96 % None (Room air) Lying   08/13/22 1538 -- 77 18 138/61 88 97 % None (Room air) Lying       Pertinent Labs/Diagnostic Test Results:   XR chest 1 view portable   ED Interpretation by Cristela Emmanuel PA-C (08/13 1330)   No acute cardiopulmonary disease      Final Result by Fernanda Estrada MD (08/13 1241)      No acute cardiopulmonary disease                    Workstation performed: [id number]           Results from last 7 days   Lab Units 08/13/22  1318   SARS-COV-2  Negative     Results from last 7 days   Lab Units 08/14/22  0724 08/13/22  1318   WBC Thousand/uL 5 40 7 80   HEMOGLOBIN g/dL 10 5* 12 0   HEMATOCRIT % 32 5* 37 3   PLATELETS Thousands/uL 165 177   NEUTROS ABS Thousands/µL 2 68 6 28         Results from last 7 days   Lab Units 08/14/22  0724 08/13/22  1318   SODIUM mmol/L 142 146   POTASSIUM mmol/L 4 3 4 6   CHLORIDE mmol/L 113* 113*   CO2 mmol/L 25 27   ANION GAP mmol/L 4 6   BUN mg/dL 34* 44*   CREATININE mg/dL 1 61* 1 86*   EGFR ml/min/1 73sq m 37 31   CALCIUM mg/dL 7 7* 8 2*   MAGNESIUM mg/dL  --  1 8*     Results from last 7 days   Lab Units 08/13/22  1318   AST U/L 14   ALT U/L 9   ALK PHOS U/L 52   TOTAL PROTEIN g/dL 5 6*   ALBUMIN g/dL 3 2*   TOTAL BILIRUBIN mg/dL 0 37         Results from last 7 days   Lab Units 08/14/22  0724 08/13/22  1318   GLUCOSE RANDOM mg/dL 81 120             No results found for: BETA-HYDROXYBUTYRATE                   Results from last 7 days   Lab Units 08/13/22  1925 08/13/22  1535 08/13/22  1318   HS TNI 0HR ng/L  --   --  40   HS TNI 2HR ng/L  --  35  --    HSTNI D2 ng/L  --  -5  --    HS TNI 4HR ng/L 34  --   --    HSTNI D4 ng/L -6  --   --          Results from last 7 days   Lab Units 08/13/22  1318   PROTIME seconds 12 9   INR  0 96   PTT seconds 32     Results from last 7 days   Lab Units 08/13/22  1318   TSH 3RD GENERATON uIU/mL 1 767         Results from last 7 days   Lab Units 08/13/22  1318   LACTIC ACID mmol/L 1 8                                         Results from last 7 days   Lab Units 08/13/22  1732   CLARITY UA  Clear   COLOR UA  Yellow   SPEC GRAV UA  1 025   PH UA  5 5   GLUCOSE UA mg/dl Negative   KETONES UA mg/dl Negative   BLOOD UA  Trace*   PROTEIN UA mg/dl Trace*   NITRITE UA  Positive*   BILIRUBIN UA  Negative   UROBILINOGEN UA E U /dl 0 2   LEUKOCYTES UA  Small*   WBC UA /hpf 30-50*   RBC UA /hpf 1-2   BACTERIA UA /hpf Innumerable*   EPITHELIAL CELLS WET PREP /hpf None Seen     Results from last 7 days   Lab Units 08/13/22  1318   INFLUENZA A PCR  Negative   INFLUENZA B PCR  Negative   RSV PCR  Negative                             Results from last 7 days   Lab Units 08/13/22  1353 08/13/22  1318   BLOOD CULTURE  Received in Microbiology Lab  Culture in Progress  Received in Microbiology Lab  Culture in Progress                 ED Treatment:   Medication Administration from 08/13/2022 1146 to 08/14/2022 0843       Date/Time Order Dose Route Action Comments     08/13/2022 1318 sodium chloride 0 9 % bolus 1,000 mL 1,000 mL Intravenous New Bag      08/13/2022 1821 magnesium sulfate IVPB (premix) SOLN 1 g 0 g Intravenous Stopped      08/13/2022 1532 magnesium sulfate IVPB (premix) SOLN 1 g 1 g Intravenous New Bag      08/13/2022 1818 cefepime (MAXIPIME) IVPB (premix in dextrose) 2,000 mg 50 mL 2,000 mg Intravenous New Bag      08/13/2022 1830 sodium chloride 0 9 % infusion 125 mL/hr Intravenous New Bag      08/13/2022 2122 donepezil (ARICEPT) tablet 10 mg 10 mg Oral Given      08/13/2022 2122 gabapentin (NEURONTIN) capsule 100 mg 100 mg Oral Given      08/13/2022 2122 QUEtiapine (SEROquel) tablet 100 mg 100 mg Oral Given      08/13/2022 2124 sodium chloride infusion 0 45 % 100 mL/hr Intravenous New Bag         Past Medical History:   Diagnosis Date    Anxiety     Benign prostate hyperplasia     Dementia (HCC)     Neuropathy     [facility]      Present on Admission:   Late onset Alzheimer's disease with behavioral disturbance ([facility]   Primary hypertension      Admitting Diagnosis: Weakness [R53 1]  Age/Sex: 80 y o  male  Admission Orders:  Scheduled Medications:  cefTRIAXone, 1,000 mg, Intravenous, Q24H  citalopram, 20 mg, Oral, Daily  clopidogrel, 75 mg, Oral, Daily  donepezil, 10 mg, Oral, HS  enoxaparin, 30 mg, Subcutaneous, Daily  gabapentin, 100 mg, Oral, BID  metoprolol tartrate, 50 mg, Oral, Daily  predniSONE, 10 mg, Oral, Daily  QUEtiapine, 100 mg, Oral, HS      Continuous IV Infusions:     PRN Meds:  acetaminophen, 650 mg, Oral, Q6H PRN  ondansetron, 4 mg, Intravenous, Q6H PRN      SCD's    Up with assistance    None    Network Utilization Review Department  ATTENTION: Please call with any questions or concerns to [phone] and carefully listen to the prompts so that you are directed to the right person  All voicemails are confidential   Shereen Alexey all requests for admission clinical reviews, approved or denied determinations and any other requests to dedicated fax number below belonging to the campus where the patient is receiving treatment   List of dedicated fax numbers for the Facilities:  FACILITY NAME UR FAX NUMBER   ADMISSION DENIALS (Administrative/Medical Necessity) [phone]   [address] (Maternity/NICU/Pediatrics) [address] [facility],[address] [facility] 125 [facility]  [phone]   [address] [place] [address] [id number] [place] [address] [address] Swartz [address] [facility] [address] [facility] [phone]

## 2022-08-14 NOTE — PROGRESS NOTES
[facility]  Progress Note - Edmond Madrid 9/7/1931, 80 y o  male MRN: [id number]  Unit/Bed#: MATT Encounter: [id number]  Primary Care Provider: Luh Ellis MD   Date and time admitted to hospital: 8/13/2022 11:50 AM    Positive blood cultures  Assessment & Plan  · 1/2 admission blood cultures positive for GPC  · Pt remains afebrile and HDS with no leukocytosis since admission, no localizing symptoms but UCx still pending from grossly pos UA  · Will cont IV CTX for now and follow up UCx as well as monitor surveillance cultures   · Await speciation of pos blood cx    Hypernatremia  Assessment & Plan  · Na 146-->142 in 24 hours following hypotonic IVF overnight  · Will hold any further IVF to avoid overcorrection  · Continue to trend Na on BMP    UTI (urinary tract infection)  Assessment & Plan  · POA with low BP and "blue fingers"  Pt unable to provide any additional history  Unable to contact facility  · Pt denies LUTS but unreliable  · UA with innumberable bacteria and WBCs  · Reasonable to treat with short course of abx, cont IV CTX  · Follow up UCX and monitor WBC and fever curve     Primary hypertension  Assessment & Plan  · BPs initially soft, but now improved  · Resumed Metop tartrate 50 mg BID    Rheumatoid arthritis with positive rheumatoid factor (HCC)  Assessment & Plan  · Continue prednisone 10 mg daily    Late onset Alzheimer's disease with behavioral disturbance (HCC)  Assessment & Plan  · Cont citalopram 20 mg daily, quetiapine 100 mg daily, donepezil 10 mg daily      VTE Pharmacologic Prophylaxis:   Pharmacologic: Enoxaparin (Lovenox)  Mechanical VTE Prophylaxis in Place: Yes    Patient Centered Rounds: I have performed bedside rounds with nursing staff today      Discussions with Specialists or Other Care Team Provider: discussed case with attending     Education and Discussions with Family / Patient: updated patient's daughter and Miguel Almanzar, over the phone    Time Spent for Care: 30 minutes  More than 50% of total time spent on counseling and coordination of care as described above  Current Length of Stay: 0 day(s)    Current Patient Status: Observation   Certification Statement: The patient will continue to require additional inpatient hospital stay due to pending culture data    Discharge Plan: likely discharge to [facility] in 24 hours pending culture data    Code Status: Level 3 - DNAR and DNI      Subjective:   Patient has no complaints and denies dysuria, urgency or frequency  Denies fever chills  Denies nausea, vomiting or diarrhea  Denies chest pain or shortness of breath  He states he isn't sure why they sent him in to the hospital and says he lives at home with his wife  He says he drove his car here  Objective:     Vitals:   No data recorded  HR:  [62-77] 65  Resp:  [16-18] 16  BP: (119-173)/(61-79) 165/72  SpO2:  [95 %-98 %] 98 %  There is no height or weight on file to calculate BMI  Input and Output Summary (last 24 hours): Intake/Output Summary (Last 24 hours) at 8/14/2022 1520  Last data filed at 8/13/2022 2124  Gross per 24 hour   Intake 362 5 ml   Output 300 ml   Net 62 5 ml       Physical Exam:     Physical Exam  Vitals and nursing note reviewed  Constitutional:       General: He is not in acute distress  Appearance: Normal appearance  He is normal weight  He is not ill-appearing or toxic-appearing  HENT:      Head: Normocephalic and atraumatic  Right Ear: External ear normal       Left Ear: External ear normal       Nose: Nose normal       Mouth/Throat:      Mouth: Mucous membranes are dry  Pharynx: Oropharynx is clear  No oropharyngeal exudate  Eyes:      Conjunctiva/sclera: Conjunctivae normal    Cardiovascular:      Rate and Rhythm: Normal rate and regular rhythm  Pulses: Normal pulses  Heart sounds: Normal heart sounds  No murmur heard  No friction rub  No gallop     Pulmonary:      Effort: Pulmonary effort is normal  No respiratory distress  Breath sounds: Normal breath sounds  No stridor  No wheezing, rhonchi or rales  Abdominal:      General: Abdomen is flat  Bowel sounds are normal  There is no distension  Palpations: Abdomen is soft  There is no mass  Tenderness: There is no abdominal tenderness  There is no guarding  Hernia: No hernia is present  Musculoskeletal:      Cervical back: Normal range of motion  Right lower leg: No edema  Left lower leg: No edema  Skin:     General: Skin is warm and dry  Capillary Refill: Capillary refill takes less than 2 seconds  Neurological:      General: No focal deficit present  Mental Status: He is alert  Sensory: No sensory deficit  Motor: No weakness  Comments: Oriented to self, tells me he lives home with wife   Psychiatric:         Thought Content: Thought content normal          Additional Data:     Labs:    Results from last 7 days   Lab Units 08/14/22  0724   WBC Thousand/uL 5 40   HEMOGLOBIN g/dL 10 5*   HEMATOCRIT % 32 5*   PLATELETS Thousands/uL 165   NEUTROS PCT % 50   LYMPHS PCT % 34   MONOS PCT % 10   EOS PCT % 5     Results from last 7 days   Lab Units 08/14/22  0724 08/13/22  1318   POTASSIUM mmol/L 4 3 4 6   CHLORIDE mmol/L 113* 113*   CO2 mmol/L 25 27   BUN mg/dL 34* 44*   CREATININE mg/dL 1 61* 1 86*   CALCIUM mg/dL 7 7* 8 2*   ALK PHOS U/L  --  52   ALT U/L  --  9   AST U/L  --  14     Results from last 7 days   Lab Units 08/13/22  1318   INR  0 96       * I Have Reviewed All Lab Data Listed Above  * Additional Pertinent Lab Tests Reviewed: Eder 66 Admission Reviewed    Imaging:    Imaging Reports Reviewed Today Include: CXR  Imaging Personally Reviewed by Myself Includes:  none    Recent Cultures (last 7 days):     Results from last 7 days   Lab Units 08/13/22  1353 08/13/22  1318   BLOOD CULTURE  Received in Microbiology Lab  Culture in Progress    --    GRAM STAIN RESULT   --  Gram positive cocci in clusters*       Last 24 Hours Medication List:   Current Facility-Administered Medications   Medication Dose Route Frequency Provider Last Rate    acetaminophen  650 mg Oral Q6H PRN Renzo Escalante MD      cefTRIAXone  1,000 mg Intravenous Q24H Lajune Carrel, PA-C Stopped (08/14/22 1030)    citalopram  20 mg Oral Daily Renzo Escalante MD      clopidogrel  75 mg Oral Daily Renzo Escalante MD      donepezil  10 mg Oral HS Renzo Escalante MD      enoxaparin  30 mg Subcutaneous Daily Renzo Escalante MD      gabapentin  100 mg Oral BID Renoz Escalante MD      metoprolol tartrate  50 mg Oral Daily Renzo Escalante MD      ondansetron  4 mg Intravenous Q6H PRN Renzo Escalante MD      predniSONE  10 mg Oral Daily Renzo Escalante MD      QUEtiapine  100 mg Oral HS Renzo Escalante MD          Today, Patient Was Seen By: Lajune Carrel, PA-C    ** Please Note: Dictation voice to text software may have been used in the creation of this document   **

## 2022-08-14 NOTE — ASSESSMENT & PLAN NOTE
· Na 146-->142 in 24 hours following hypotonic IVF overnight  · Will hold any further IVF to avoid overcorrection  · Continue to trend Na on BMP

## 2022-08-14 NOTE — ED NOTES
Patient had a large bm through brief; covered head to toe in stool; patient cleaned up and transferred to floor     Nelson Navarro RN  08/14/22 7948

## 2022-08-14 NOTE — ASSESSMENT & PLAN NOTE
· 1/2 admission blood cultures positive for GPC  · Pt remains afebrile and HDS with no leukocytosis since admission, no localizing symptoms but UCx still pending from grossly pos UA  · Will cont IV CTX for now and follow up UCx as well as monitor surveillance cultures   · Await speciation of pos blood cx

## 2022-08-14 NOTE — ASSESSMENT & PLAN NOTE
· POA with low BP and "blue fingers"  Pt unable to provide any additional history  Unable to contact facility  · Pt denies LUTS but unreliable    · UA with innumberable bacteria and WBCs  · Reasonable to treat with short course of abx, cont IV CTX  · Follow up UCX and monitor WBC and fever curve

## 2022-08-15 LAB
ANION GAP SERPL CALCULATED.3IONS-SCNC: 5 MMOL/L (ref 4–13)
BASOPHILS # BLD AUTO: 0.02 THOUSANDS/ΜL (ref 0–0.1)
BASOPHILS NFR BLD AUTO: 0 % (ref 0–1)
BUN SERPL-MCNC: 32 MG/DL (ref 5–25)
CALCIUM SERPL-MCNC: 8.1 MG/DL (ref 8.4–10.2)
CHLORIDE SERPL-SCNC: 109 MMOL/L (ref 96–108)
CO2 SERPL-SCNC: 24 MMOL/L (ref 21–32)
CREAT SERPL-MCNC: 1.56 MG/DL (ref 0.6–1.3)
EOSINOPHIL # BLD AUTO: 0.3 THOUSAND/ΜL (ref 0–0.61)
EOSINOPHIL NFR BLD AUTO: 6 % (ref 0–6)
ERYTHROCYTE [DISTWIDTH] IN BLOOD BY AUTOMATED COUNT: 13.7 % (ref 11.6–15.1)
GFR SERPL CREATININE-BSD FRML MDRD: 38 ML/MIN/1.73SQ M
GLUCOSE SERPL-MCNC: 81 MG/DL (ref 65–140)
HCT VFR BLD AUTO: 35.1 % (ref 36.5–49.3)
HGB BLD-MCNC: 11.3 G/DL (ref 12–17)
IMM GRANULOCYTES # BLD AUTO: 0.05 THOUSAND/UL (ref 0–0.2)
IMM GRANULOCYTES NFR BLD AUTO: 1 % (ref 0–2)
LYMPHOCYTES # BLD AUTO: 1.84 THOUSANDS/ΜL (ref 0.6–4.47)
LYMPHOCYTES NFR BLD AUTO: 36 % (ref 14–44)
MCH RBC QN AUTO: 31.7 PG (ref 26.8–34.3)
MCHC RBC AUTO-ENTMCNC: 32.2 G/DL (ref 31.4–37.4)
MCV RBC AUTO: 99 FL (ref 82–98)
MONOCYTES # BLD AUTO: 0.49 THOUSAND/ΜL (ref 0.17–1.22)
MONOCYTES NFR BLD AUTO: 10 % (ref 4–12)
NEUTROPHILS # BLD AUTO: 2.4 THOUSANDS/ΜL (ref 1.85–7.62)
NEUTS SEG NFR BLD AUTO: 47 % (ref 43–75)
NRBC BLD AUTO-RTO: 0 /100 WBCS
PLATELET # BLD AUTO: 194 THOUSANDS/UL (ref 149–390)
PMV BLD AUTO: 10 FL (ref 8.9–12.7)
POTASSIUM SERPL-SCNC: 4.5 MMOL/L (ref 3.5–5.3)
RBC # BLD AUTO: 3.56 MILLION/UL (ref 3.88–5.62)
SODIUM SERPL-SCNC: 138 MMOL/L (ref 135–147)
WBC # BLD AUTO: 5.1 THOUSAND/UL (ref 4.31–10.16)

## 2022-08-15 PROCEDURE — 87040 BLOOD CULTURE FOR BACTERIA: CPT | Performed by: INTERNAL MEDICINE

## 2022-08-15 PROCEDURE — 99233 SBSQ HOSP IP/OBS HIGH 50: CPT | Performed by: PHYSICIAN ASSISTANT

## 2022-08-15 PROCEDURE — 85025 COMPLETE CBC W/AUTO DIFF WBC: CPT | Performed by: INTERNAL MEDICINE

## 2022-08-15 PROCEDURE — 80048 BASIC METABOLIC PNL TOTAL CA: CPT | Performed by: INTERNAL MEDICINE

## 2022-08-15 RX ORDER — MICONAZOLE NITRATE 20 MG/G
CREAM TOPICAL 2 TIMES DAILY
Status: DISCONTINUED | OUTPATIENT
Start: 2022-08-15 | End: 2022-08-20 | Stop reason: HOSPADM

## 2022-08-15 RX ADMIN — ENOXAPARIN SODIUM 30 MG: 30 INJECTION SUBCUTANEOUS at 10:17

## 2022-08-15 RX ADMIN — CLOPIDOGREL BISULFATE 75 MG: 75 TABLET ORAL at 10:18

## 2022-08-15 RX ADMIN — CEFTRIAXONE 1000 MG: 1 INJECTION, SOLUTION INTRAVENOUS at 10:17

## 2022-08-15 RX ADMIN — GABAPENTIN 100 MG: 100 CAPSULE ORAL at 10:18

## 2022-08-15 RX ADMIN — QUETIAPINE FUMARATE 100 MG: 100 TABLET ORAL at 21:13

## 2022-08-15 RX ADMIN — METOPROLOL TARTRATE 50 MG: 50 TABLET, FILM COATED ORAL at 10:18

## 2022-08-15 RX ADMIN — GABAPENTIN 100 MG: 100 CAPSULE ORAL at 17:29

## 2022-08-15 RX ADMIN — CITALOPRAM HYDROBROMIDE 20 MG: 20 TABLET ORAL at 10:18

## 2022-08-15 RX ADMIN — PREDNISONE 10 MG: 10 TABLET ORAL at 10:18

## 2022-08-15 RX ADMIN — DONEPEZIL HYDROCHLORIDE 10 MG: 5 TABLET ORAL at 21:13

## 2022-08-15 NOTE — UTILIZATION REVIEW
Continued Stay Review    Date: 8/15/22                          Current Patient Class:  IP  Current Level of Care:  MS    HPI:89 y o  male initially admitted on 8/13 as OBS converted to IP 8/14 with UTI  1/2 blood cultures + GPC  Assessment/Plan: 8/15 Day 2  1/2 blood cultures + staph coagulase neg  Urine culture with>100,000 GNR inconsistent w/ true translocation/urosepsis  Pt denies any dysuria, urgency or frequency  Denies flank pain  Denies fever chills    WBC WNL, Pt afebrile   Sodium stable  138 today  Plan - Continue IV ceftriaxone, F/U repeat blood cultures drawn today   CBC, CMP in am       Vital Signs:  Date/Time Temp Pulse Resp BP MAP (mmHg) SpO2   08/15/22 15:39:20 98 7 °F (37 1 °C) -- -- 132/63 86 --   08/15/22 0700 98 4 °F (36 9 °C) 52 Abnormal  16 128/72 -- 96 %   08/14/22 2146 97 9 °F (36 6 °C) 62 18 147/75 99 97 %   08/14/22 15:59:15 97 6 °F (36 4 °C) 55 16 143/65 91 96 %   08/14/22 1430 -- 65 16 165/72 -- 98 %         Pertinent Labs/Diagnostic Results:   Results from last 7 days   Lab Units 08/13/22  1318   SARS-COV-2  Negative     Results from last 7 days   Lab Units 08/15/22  0332 08/14/22  0724 08/13/22  1318   WBC Thousand/uL 5 10 5 40 7 80   HEMOGLOBIN g/dL 11 3* 10 5* 12 0   HEMATOCRIT % 35 1* 32 5* 37 3   PLATELETS Thousands/uL 194 165 177   NEUTROS ABS Thousands/µL 2 40 2 68 6 28         Results from last 7 days   Lab Units 08/15/22  0332 08/14/22  0724 08/13/22  1318   SODIUM mmol/L 138 142 146   POTASSIUM mmol/L 4 5 4 3 4 6   CHLORIDE mmol/L 109* 113* 113*   CO2 mmol/L 24 25 27   ANION GAP mmol/L 5 4 6   BUN mg/dL 32* 34* 44*   CREATININE mg/dL 1 56* 1 61* 1 86*   EGFR ml/min/1 73sq m 38 37 31   CALCIUM mg/dL 8 1* 7 7* 8 2*   MAGNESIUM mg/dL  --   --  1 8*     Results from last 7 days   Lab Units 08/13/22  1318   AST U/L 14   ALT U/L 9   ALK PHOS U/L 52   TOTAL PROTEIN g/dL 5 6*   ALBUMIN g/dL 3 2*   TOTAL BILIRUBIN mg/dL 0 37         Results from last 7 days   Lab Units 08/15/22  0332 08/14/22  0724 08/13/22  1318   GLUCOSE RANDOM mg/dL 81 81 120               Results from last 7 days   Lab Units 08/13/22  1925 08/13/22  1535 08/13/22  1318   HS TNI 0HR ng/L  --   --  40   HS TNI 2HR ng/L  --  35  --    HSTNI D2 ng/L  --  -5  --    HS TNI 4HR ng/L 34  --   --    HSTNI D4 ng/L -6  --   --          Results from last 7 days   Lab Units 08/13/22  1318   PROTIME seconds 12 9   INR  0 96   PTT seconds 32     Results from last 7 days   Lab Units 08/13/22  1318   TSH 3RD GENERATON uIU/mL 1 767         Results from last 7 days   Lab Units 08/13/22  1318   LACTIC ACID mmol/L 1 8               Results from last 7 days   Lab Units 08/13/22  1732   CLARITY UA  Clear   COLOR UA  Yellow   SPEC GRAV UA  1 025   PH UA  5 5   GLUCOSE UA mg/dl Negative   KETONES UA mg/dl Negative   BLOOD UA  Trace*   PROTEIN UA mg/dl Trace*   NITRITE UA  Positive*   BILIRUBIN UA  Negative   UROBILINOGEN UA E U /dl 0 2   LEUKOCYTES UA  Small*   WBC UA /hpf 30-50*   RBC UA /hpf 1-2   BACTERIA UA /hpf Innumerable*   EPITHELIAL CELLS WET PREP /hpf None Seen     Results from last 7 days   Lab Units 08/13/22  1318   INFLUENZA A PCR  Negative   INFLUENZA B PCR  Negative   RSV PCR  Negative                             Results from last 7 days   Lab Units 08/15/22  0350 08/14/22  1900 08/13/22  1732 08/13/22  1353 08/13/22  1318   BLOOD CULTURE  Received in Microbiology Lab  Culture in Progress  Received in Microbiology Lab  Culture in Progress  Received in Microbiology Lab  Culture in Progress  --  No Growth at 24 hrs   Staphylococcus coagulase negative*   GRAM STAIN RESULT   --   --   --   --  Gram positive cocci in clusters*   URINE CULTURE   --   --  >100,000 cfu/ml Gram Negative Lito Enteric Like*  --   --                  Medications:   Scheduled Medications:  cefTRIAXone, 1,000 mg, Intravenous, Q24H  citalopram, 20 mg, Oral, Daily  clopidogrel, 75 mg, Oral, Daily  donepezil, 10 mg, Oral, HS  enoxaparin, 30 mg, Subcutaneous, Daily  gabapentin, 100 mg, Oral, BID  metoprolol tartrate, 50 mg, Oral, Daily  JESSICA ANTIFUNGAL, , Topical, BID  predniSONE, 10 mg, Oral, Daily  QUEtiapine, 100 mg, Oral, HS      Continuous IV Infusions:     PRN Meds:  acetaminophen, 650 mg, Oral, Q6H PRN  ondansetron, 4 mg, Intravenous, Q6H PRN        Discharge Plan: TBD    Network Utilization Review Department  ATTENTION: Please call with any questions or concerns to [phone] and carefully listen to the prompts so that you are directed to the right person  All voicemails are confidential   Tracy Barnes all requests for admission clinical reviews, approved or denied determinations and any other requests to dedicated fax number below belonging to the campus where the patient is receiving treatment   List of dedicated fax numbers for the Facilities:  [address] DENIALS (Administrative/Medical Necessity) [phone]   [address] (Maternity/NICU/Pediatrics) [phone]   [address]  [phone] [id number] [address] [facility] [facility] [phone]

## 2022-08-15 NOTE — ASSESSMENT & PLAN NOTE
· Resolved  · Na 146-->142 in 24 hours following hypotonic IVF on admission, Na stable at 138 today  · Will hold any further IVF to avoid overcorrection  · Continue to trend Na on BMP

## 2022-08-15 NOTE — PROGRESS NOTES
[facility]  Progress Note - Tyler Ley 9/7/1931, 80 y o  male MRN: [id number]  Unit/Bed#: S -01 Encounter: [id number]  Primary Care Provider: Jeet Stuart MD   Date and time admitted to hospital: 8/13/2022 11:50 AM    Positive blood cultures  Assessment & Plan  · 1/2 admission blood cultures positive for GPC, staph coagulase neg  · Pt remains afebrile and HDS with no leukocytosis since admission, no localizing symptoms and UCx with GNR inconsistent with true translocation/urosepsis  · Follow up repeat blood cx's  · Await speciation     Hypernatremia  Assessment & Plan  · Resolved  · Na 146-->142 in 24 hours following hypotonic IVF on admission, Na stable at 138 today  · Will hold any further IVF to avoid overcorrection  · Continue to trend Na on BMP    UTI (urinary tract infection)  Assessment & Plan  · POA with low BP and "blue fingers"  Pt unable to provide any additional history  Unable to contact facility  · Pt denies LUTS but unreliable  · UA with innumberable bacteria and WBCs and UCx with >100K cfu/ml of GNR  · Reasonable to treat with short course of abx, cont IV CTX  · Follow up s/sx    Primary hypertension  Assessment & Plan  · BPs initially soft, but now improved  · Resumed Metop tartrate 50 mg BID    Rheumatoid arthritis with positive rheumatoid factor (HCC)  Assessment & Plan  · Continue prednisone 10 mg daily    Late onset Alzheimer's disease with behavioral disturbance (HCC)  Assessment & Plan  · Cont citalopram 20 mg daily, quetiapine 100 mg daily, donepezil 10 mg daily      VTE Pharmacologic Prophylaxis:   Pharmacologic: Enoxaparin (Lovenox)  Mechanical VTE Prophylaxis in Place: Yes    Patient Centered Rounds: I have performed bedside rounds with nursing staff today      Discussions with Specialists or Other Care Team Provider: discussed case with nursing and case mangement    Education and Discussions with Family / Patient: updated patient's daughter Kingsley Level over the phone    Time Spent for Care: 30 minutes  More than 50% of total time spent on counseling and coordination of care as described above  Current Length of Stay: 1 day(s)    Current Patient Status: Inpatient   Certification Statement: The patient will continue to require additional inpatient hospital stay due to pending PT eval to return to [facility]    Discharge Plan: likely 24-48 hours return to [facility] once blood cx's remain clear    Code Status: Level 3 - DNAR and DNI      Subjective:   Patient denies any complaints  He says he is at the hospital but that is , he says he cannot remember the exact year  He denies any dysuria, urgency or frequency  Denies flank pain  Denies fever chills  Denies chest pain or trouble breathing  Denies nausea, vomiting or diarrhea  Objective:     Vitals:   Temp (24hrs), Av °F (36 7 °C), Min:97 6 °F (36 4 °C), Max:98 4 °F (36 9 °C)    Temp:  [97 6 °F (36 4 °C)-98 4 °F (36 9 °C)] 98 4 °F (36 9 °C)  HR:  [52-65] 52  Resp:  [16-18] 16  BP: (128-165)/(65-75) 128/72  SpO2:  [96 %-98 %] 96 %  There is no height or weight on file to calculate BMI  Input and Output Summary (last 24 hours): Intake/Output Summary (Last 24 hours) at 8/15/2022 1422  Last data filed at 2022 2146  Gross per 24 hour   Intake 360 ml   Output --   Net 360 ml       Physical Exam:     Physical Exam  Vitals and nursing note reviewed  Constitutional:       Appearance: Normal appearance  He is normal weight  HENT:      Head: Normocephalic and atraumatic  Right Ear: External ear normal       Left Ear: External ear normal       Nose: Nose normal       Mouth/Throat:      Mouth: Mucous membranes are moist       Pharynx: Oropharynx is clear  No oropharyngeal exudate  Eyes:      General: No scleral icterus  Conjunctiva/sclera: Conjunctivae normal    Cardiovascular:      Rate and Rhythm: Normal rate and regular rhythm  Pulses: Normal pulses     Pulmonary:      Effort: Pulmonary effort is normal  No respiratory distress  Breath sounds: Normal breath sounds  No stridor  No wheezing, rhonchi or rales  Abdominal:      General: Abdomen is flat  Bowel sounds are normal  There is no distension  Palpations: Abdomen is soft  There is no mass  Tenderness: There is no abdominal tenderness  There is no guarding or rebound  Hernia: No hernia is present  Musculoskeletal:      Cervical back: Normal range of motion  Right lower leg: No edema  Left lower leg: No edema  Skin:     General: Skin is warm and dry  Capillary Refill: Capillary refill takes less than 2 seconds  Coloration: Skin is not jaundiced or pale  Findings: No bruising, erythema, lesion or rash  Neurological:      General: No focal deficit present  Mental Status: He is alert  Mental status is at baseline  Cranial Nerves: No cranial nerve deficit  Motor: No weakness  Gait: Gait normal       Comments: Oriented to person and says he's in the hospital but that it is 2007 (he is aware that he cannot remember the year)   Psychiatric:         Mood and Affect: Mood normal          Thought Content: Thought content normal          Additional Data:     Labs:    Results from last 7 days   Lab Units 08/15/22  0332   WBC Thousand/uL 5 10   HEMOGLOBIN g/dL 11 3*   HEMATOCRIT % 35 1*   PLATELETS Thousands/uL 194   NEUTROS PCT % 47   LYMPHS PCT % 36   MONOS PCT % 10   EOS PCT % 6     Results from last 7 days   Lab Units 08/15/22  0332 08/14/22  0724 08/13/22  1318   POTASSIUM mmol/L 4 5   < > 4 6   CHLORIDE mmol/L 109*   < > 113*   CO2 mmol/L 24   < > 27   BUN mg/dL 32*   < > 44*   CREATININE mg/dL 1 56*   < > 1 86*   CALCIUM mg/dL 8 1*   < > 8 2*   ALK PHOS U/L  --   --  52   ALT U/L  --   --  9   AST U/L  --   --  14    < > = values in this interval not displayed       Results from last 7 days   Lab Units 08/13/22  1318   INR  0 96       * I Have Reviewed All Lab Data Listed Above   * Additional Pertinent Lab Tests Reviewed: Eder 66 Admission Reviewed    Imaging:    Imaging Reports Reviewed Today Include: none  Imaging Personally Reviewed by Myself Includes:  none    Recent Cultures (last 7 days):     Results from last 7 days   Lab Units 08/15/22  0350 08/14/22  1900 08/13/22  1732 08/13/22  1353 08/13/22  1318   BLOOD CULTURE  Received in Microbiology Lab  Culture in Progress  Received in Microbiology Lab  Culture in Progress  Received in Microbiology Lab  Culture in Progress  --  No Growth at 24 hrs  Staphylococcus coagulase negative*   GRAM STAIN RESULT   --   --   --   --  Gram positive cocci in clusters*   URINE CULTURE   --   --  >100,000 cfu/ml Gram Negative Lito Enteric Like*  --   --        Last 24 Hours Medication List:   Current Facility-Administered Medications   Medication Dose Route Frequency Provider Last Rate    acetaminophen  650 mg Oral Q6H PRN Delaney Abdalla MD      cefTRIAXone  1,000 mg Intravenous Q24H Fanta Varela PA-C 1,000 mg (08/15/22 1017)    citalopram  20 mg Oral Daily Delaney Abdalla MD      clopidogrel  75 mg Oral Daily Delaney Abdalla MD      donepezil  10 mg Oral HS Delaney Abdalla MD      enoxaparin  30 mg Subcutaneous Daily Delaney Abdalla MD      gabapentin  100 mg Oral BID Delaney Abdalla MD      metoprolol tartrate  50 mg Oral Daily Delaney Abdalla MD      ondansetron  4 mg Intravenous Q6H PRN Delaney Abdalla MD      predniSONE  10 mg Oral Daily Delaney Abdalla MD      QUEtiapine  100 mg Oral HS Delaney Abdalla MD          Today, Patient Was Seen By: Fanta Varela PA-C    ** Please Note: Dictation voice to text software may have been used in the creation of this document   **

## 2022-08-15 NOTE — ASSESSMENT & PLAN NOTE
· POA with low BP and "blue fingers"  Pt unable to provide any additional history  Unable to contact facility  · Pt denies LUTS but unreliable    · UA with innumberable bacteria and WBCs and UCx with >100K cfu/ml of GNR  · Reasonable to treat with short course of abx, cont IV CTX  · Follow up s/sx

## 2022-08-15 NOTE — ASSESSMENT & PLAN NOTE
· 1/2 admission blood cultures positive for GPC, staph coagulase neg  · Pt remains afebrile and HDS with no leukocytosis since admission, no localizing symptoms and UCx with GNR inconsistent with true translocation/urosepsis  · Follow up repeat blood cx's  · Await speciation

## 2022-08-16 PROBLEM — S31.809A BUTTOCK WOUND: Status: ACTIVE | Noted: 2022-08-16

## 2022-08-16 LAB
ALBUMIN SERPL BCP-MCNC: 3 G/DL (ref 3.5–5)
ALP SERPL-CCNC: 51 U/L (ref 34–104)
ALT SERPL W P-5'-P-CCNC: 5 U/L (ref 7–52)
ANION GAP SERPL CALCULATED.3IONS-SCNC: 5 MMOL/L (ref 4–13)
AST SERPL W P-5'-P-CCNC: 12 U/L (ref 13–39)
BACTERIA BLD CULT: ABNORMAL
BACTERIA UR CULT: ABNORMAL
BASOPHILS # BLD AUTO: 0.02 THOUSANDS/ΜL (ref 0–0.1)
BASOPHILS NFR BLD AUTO: 1 % (ref 0–1)
BILIRUB SERPL-MCNC: 0.41 MG/DL (ref 0.2–1)
BUN SERPL-MCNC: 28 MG/DL (ref 5–25)
CALCIUM ALBUM COR SERPL-MCNC: 8.8 MG/DL (ref 8.3–10.1)
CALCIUM SERPL-MCNC: 8 MG/DL (ref 8.4–10.2)
CHLORIDE SERPL-SCNC: 110 MMOL/L (ref 96–108)
CO2 SERPL-SCNC: 24 MMOL/L (ref 21–32)
CREAT SERPL-MCNC: 1.56 MG/DL (ref 0.6–1.3)
EOSINOPHIL # BLD AUTO: 0.28 THOUSAND/ΜL (ref 0–0.61)
EOSINOPHIL NFR BLD AUTO: 7 % (ref 0–6)
ERYTHROCYTE [DISTWIDTH] IN BLOOD BY AUTOMATED COUNT: 13.8 % (ref 11.6–15.1)
GFR SERPL CREATININE-BSD FRML MDRD: 38 ML/MIN/1.73SQ M
GLUCOSE SERPL-MCNC: 81 MG/DL (ref 65–140)
GRAM STN SPEC: ABNORMAL
HCT VFR BLD AUTO: 34.3 % (ref 36.5–49.3)
HGB BLD-MCNC: 10.9 G/DL (ref 12–17)
IMM GRANULOCYTES # BLD AUTO: 0.07 THOUSAND/UL (ref 0–0.2)
IMM GRANULOCYTES NFR BLD AUTO: 2 % (ref 0–2)
LYMPHOCYTES # BLD AUTO: 1.75 THOUSANDS/ΜL (ref 0.6–4.47)
LYMPHOCYTES NFR BLD AUTO: 41 % (ref 14–44)
MCH RBC QN AUTO: 31.2 PG (ref 26.8–34.3)
MCHC RBC AUTO-ENTMCNC: 31.8 G/DL (ref 31.4–37.4)
MCV RBC AUTO: 98 FL (ref 82–98)
MONOCYTES # BLD AUTO: 0.43 THOUSAND/ΜL (ref 0.17–1.22)
MONOCYTES NFR BLD AUTO: 10 % (ref 4–12)
NEUTROPHILS # BLD AUTO: 1.76 THOUSANDS/ΜL (ref 1.85–7.62)
NEUTS SEG NFR BLD AUTO: 39 % (ref 43–75)
NRBC BLD AUTO-RTO: 0 /100 WBCS
PLATELET # BLD AUTO: 186 THOUSANDS/UL (ref 149–390)
PMV BLD AUTO: 10.4 FL (ref 8.9–12.7)
POTASSIUM SERPL-SCNC: 4.4 MMOL/L (ref 3.5–5.3)
PROT SERPL-MCNC: 5.1 G/DL (ref 6.4–8.4)
RBC # BLD AUTO: 3.49 MILLION/UL (ref 3.88–5.62)
S EPIDERMIDIS DNA BLD POS QL NAA+NON-PRB: DETECTED
SODIUM SERPL-SCNC: 139 MMOL/L (ref 135–147)
WBC # BLD AUTO: 4.31 THOUSAND/UL (ref 4.31–10.16)

## 2022-08-16 PROCEDURE — 87081 CULTURE SCREEN ONLY: CPT | Performed by: INTERNAL MEDICINE

## 2022-08-16 PROCEDURE — 97163 PT EVAL HIGH COMPLEX 45 MIN: CPT

## 2022-08-16 PROCEDURE — 85025 COMPLETE CBC W/AUTO DIFF WBC: CPT | Performed by: PHYSICIAN ASSISTANT

## 2022-08-16 PROCEDURE — 99232 SBSQ HOSP IP/OBS MODERATE 35: CPT | Performed by: PHYSICIAN ASSISTANT

## 2022-08-16 PROCEDURE — 99233 SBSQ HOSP IP/OBS HIGH 50: CPT | Performed by: PHYSICIAN ASSISTANT

## 2022-08-16 PROCEDURE — 80053 COMPREHEN METABOLIC PANEL: CPT | Performed by: PHYSICIAN ASSISTANT

## 2022-08-16 RX ADMIN — CEFTRIAXONE 1000 MG: 1 INJECTION, SOLUTION INTRAVENOUS at 08:56

## 2022-08-16 RX ADMIN — QUETIAPINE FUMARATE 100 MG: 100 TABLET ORAL at 21:39

## 2022-08-16 RX ADMIN — GABAPENTIN 100 MG: 100 CAPSULE ORAL at 17:14

## 2022-08-16 RX ADMIN — CITALOPRAM HYDROBROMIDE 20 MG: 20 TABLET ORAL at 08:53

## 2022-08-16 RX ADMIN — CLOPIDOGREL BISULFATE 75 MG: 75 TABLET ORAL at 08:53

## 2022-08-16 RX ADMIN — ENOXAPARIN SODIUM 30 MG: 30 INJECTION SUBCUTANEOUS at 08:52

## 2022-08-16 RX ADMIN — MICONAZOLE NITRATE: 20 CREAM TOPICAL at 17:14

## 2022-08-16 RX ADMIN — GABAPENTIN 100 MG: 100 CAPSULE ORAL at 08:53

## 2022-08-16 RX ADMIN — MICONAZOLE NITRATE: 20 CREAM TOPICAL at 08:56

## 2022-08-16 RX ADMIN — METOPROLOL TARTRATE 50 MG: 50 TABLET, FILM COATED ORAL at 08:53

## 2022-08-16 RX ADMIN — DONEPEZIL HYDROCHLORIDE 10 MG: 5 TABLET ORAL at 21:38

## 2022-08-16 RX ADMIN — PREDNISONE 10 MG: 10 TABLET ORAL at 08:53

## 2022-08-16 NOTE — PHYSICAL THERAPY NOTE
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 [facility])    Primary hypertension    PVD (peripheral vascular disease) ([facility] )    UTI (urinary tract infection)    Hypernatremia    Positive blood cultures    Buttock wound     Vitals:    08/15/22 1539 08/15/22 2142 08/16/22 0100 08/16/22 0700   BP: 132/63 149/76  154/76   BP Location:       Pulse:    (!) 54   Resp:  18  18   Temp: 98 7 °F (37 1 °C) 98 2 °F (36 8 °C)  97 6 °F (36 4 °C)   TempSrc:       SpO2:   95% 92%       Length Of Stay: 2  Performed at least 2 patient identifiers during session: Name and Birthday  PHYSICAL THERAPY EVALUATION :    08/16/22 1230   PT Last Visit   PT Visit Date 08/16/22   Note Type   Note type Evaluation   Pain Assessment   Pain Assessment Tool 0-10   Pain Score No Pain  (@ rest, but R knee pain w/ ambulation)   Effect of Pain on Daily Activities limits gait quality, speed and indep of WB/ ambulation   Patient's Stated Pain Goal No pain   Pain Rating: FLACC (Activity) - Face 1   Pain Rating: FLACC (Activity) - Legs 1   Pain Rating: FLACC (Activity) - Activity 1   Pain Rating: FLACC (Activity) - Cry 1   Pain Rating: FLACC (Activity) - Consolability 0   Score: FLACC (Activity) 4   Restrictions/Precautions   Other Precautions Bed Alarm; Chair Alarm;Cognitive;Contact/isolation   Home Living   Type of Home   ([facility])   Home Layout One level   Additional Comments Pt unable to state his mobility baseline on assessment   Chart [facility]   Prior Function   Falls in the last 6 months   (Pt unable to quantify)   General   Family/Caregiver Present No   Cognition   Overall Cognitive Status Impaired   Arousal/Participation Alert   Orientation Level Oriented to person   Memory Decreased short term memory;Decreased recall of biographical information;Decreased long term memory;Decreased recall of recent events;Decreased recall of precautions   Following Commands Follows one step commands with increased time or repetition   Comments Pt incontinent upon entering room   Subjective   Subjective Upon entering room, pt in bed with sheets over his head  Pt states that the sunlight was bothering his eyes  RUE Assessment   RUE Assessment WFL   LUE Assessment   LUE Assessment WFL   RLE Assessment   RLE Assessment   (bruising noted)   Strength RLE   R Hip Flexion 4-/5   R Knee Extension 4-/5   R Ankle Dorsiflexion 4/5   Strength LLE   L Hip Flexion 4/5   L Knee Extension 4/5   L Ankle Dorsiflexion 4/5   Light Touch   RLE Light Touch Not tested  (inconsistent with command following)   LLE Light Touch Not tested  (inconsistent with command following)   Bed Mobility   Supine to Sit 5  Supervision   Additional items Assist x 1;HOB elevated; Bedrails; Increased time required  (Regress, susccessful no second attempt)   Transfers   Sit to Stand 5  Supervision   Additional items Assist x 1; Increased time required;Verbal cues   Stand to Sit 4  Minimal assistance   Additional items Assist x 1; Increased time required;Verbal cues  (for completion of transfers)   Stand pivot 4  Minimal assistance  (R side)   Additional items Assist x 1; Increased time required;Armrests   Ambulation/Elevation   Gait pattern Decreased R stance; Antalgic; Excessively slow; Inconsistent jeanne   Gait Assistance 4  Minimal assist   Additional items Assist x 1;Verbal cues   Assistive Device Rolling walker   Distance 3' forward and 2 feet backward   Stair Management Assistance Not tested   Balance   Static Sitting Good   Static Standing Fair -   Ambulatory Poor + Endurance Deficit   Endurance Deficit Yes   Endurance Deficit Description limited amb distance and degradation of posture/gait quality   Activity Tolerance   Activity Tolerance Patient limited by pain; Patient limited by fatigue   Medical Staff Made Aware spoke to [facility] from [place], spoke to Zonia from case management for more information   Nurse Made Aware spoke to Alba Bumpers and care coordination w/ Kimber RN   Assessment   Prognosis Fair   Problem List Decreased strength;Decreased endurance; Impaired balance;Decreased mobility; Decreased coordination;Decreased cognition; Impaired judgement;Decreased safety awareness; Obesity; Decreased skin integrity;Pain  (gait deviations)   Assessment Assessment: Pt is a 80 y o  male seen for PT evaluation s/p admit to [facility] on 8/13/2022   Order placed for PT  Prior to admission: Pt lived at [facility]  After consult, case management notified this writer that patient "will have to ambulate, and that he uses a wheelchair most times  He does not ambulate much but ambulates to and from the bathroom "  Upon evaluation: Pt requires supervision for bed mobility, intermittent Min assist for transfers including to target surface  Patient was able to ambulate 3 steps forward and 2 steps back with a rolling walker and assistance     Pt's clinical presentation is currently unstable/unpredictable given the functional mobility deficits above, especially (but not limited to) weakness and decreased functional mobility tolerance, coupled with fall risks including impaired balance, impaired judgement, decreased safety awareness, decreased cognition and ? Falls, and combined with medical complications of bradycardia, abnormal renal lab values and abnormal H&H  Recommend trials with wheelchair mobility, progression with rolling walker as appropriate, TherEx  Barriers to Discharge Decreased caregiver support; Inaccessible home environment   Goals   Patient Goals none stated, but agreeable for OOB   STG Expiration Date 08/26/22   Short Term Goal #1 Goals: Pt will: Perform bed mobility tasks with supervision to prepare for transfers and reposition in bed  Perform transfers with supervision to decrease risk for falls and improve ease of transfers  Perform ambulation with L RAD for 20 ft with supervision to decrease risk for falls and increase Indep in prior living environment including ambulating to and from bathroom at facility  Increase standing tolerance to 2 minutes with BUE support to promote upright activity tolerance  Patient to propel wheelchair for 50 ft with supervision as alternative to ambulation   PT Treatment Day 0   Plan   Treatment/Interventions Functional transfer training;LE strengthening/ROM; Therapeutic exercise; Endurance training;Cognitive reorientation;Equipment eval/education;Patient/family training;Gait training;Bed mobility;Spoke to nursing   PT Frequency 2-3x/wk   Recommendation   PT Discharge Recommendation Post acute rehabilitation services  (Unless facility can provide physical therapy and increased services for patient upon discharge)   Equipment Recommended Walker; Wheelchair   AM-PAC Basic Mobility Inpatient   Turning in Bed Without Bedrails 3   Lying on Back to Sitting on Edge of Flat Bed 3   Moving Bed to Chair 3   Standing Up From Chair 3   Walk in Room 1   Climb 3-5 Stairs 1   Basic Mobility Inpatient Raw Score 14   Basic Mobility Standardized Score 35 55   Highest Level Of Mobility   JH-HLM Goal 4: Move to chair/commode   JH-HLM Achieved 5: Stand (1 or more minutes)   End of Consult   Patient Position at End of Consult All needs within reach;Bed/Chair alarm activated; Bedside chair   (Please find full objective findings from PT assessment regarding body systems outlined above)  Pt to benefit from continued skilled PT tx while in hospital and upon DC to address deficits as defined above and maximize level of functional mobility     Personal factors affecting pt at time of IE include: limited home support, advanced age, past experience, behavioral pattern, inability to perform ADLs, inability to ambulate household distances, inability to navigate community distances, limited insight into impairments and ? falls  The patient's [facility] Basic Mobility Inpatient Short Form Raw Score is 14, Standardized Score is 35 55  A Raw Score of less than 16 suggests the patient may benefit from discharge to post-acute rehabilitation services, which DOES coincide with CURRENT above PT recommendations  However please refer to therapist recommendation for discharge planning given other factors that may influence destination  Adapted from [facility] [place] [place] of [facility] 6-Clicks Basic Mobility and Daily Activity Scores With Discharge Destination  Physical Therapy, 2021;101:1-9  DOI: 10 [id number]    Portions of the record may have been created with voice recognition software  Occasional wrong word or "sound a like" substitutions may have occurred due to the inherent limitations of voice recognition software    Read the chart carefully and recognize, using context, where substitutions have occurred

## 2022-08-16 NOTE — DISCHARGE SUMMARY
[facility]  Discharge- [facility] 9/7/1931, 80 y o  male MRN: [id number]  Unit/Bed#: S -01 Encounter: [id number]  Primary Care Provider: Leslie Coombs MD   Date and time admitted to hospital: 8/13/2022 11:50 AM    * UTI (urinary tract infection)  Assessment & Plan  · POA with low BP and "blue fingers"  Pt unable to provide any additional history  Unable to contact facility  · Pt denies LUTS but unreliable  · Stable for discharge   · Complete course of [facility] outpatient     Positive blood cultures  Assessment & Plan  · Likely contaminant  · 1/2 admission blood cultures positive for GPC, staph coagulase neg  · Repeat cultures negative   · No further work up or treatment needed    Buttock wound  Assessment & Plan  · All wound present on admission     · Patient with scattered wounds of right hip buttock and posterior thigh  · Wound care consulted  · Appreciate their recommendations, wound care instructions copied into discharge instructions section    Late onset Alzheimer's disease with behavioral disturbance (HCC)  Assessment & Plan  · Stable   · Continue citalopram 20 mg daily, quetiapine 100 mg daily, donepezil 10 mg daily    Primary hypertension  Assessment & Plan  · BPs initially soft, but now improved  · Resumed Metoprolol tartrate 50 mg BID    Rheumatoid arthritis with positive rheumatoid factor ([facility] )  Assessment & Plan  · Continue prednisone 10 mg daily    Discharging Physician / Practitioner: Janene Garg PA-C  PCP: Leslie Coombs MD  Admission Date:   Admission Orders (From admission, onward)     Ordered        08/14/22 1537  Inpatient Admission  Once            08/13/22 1819  Place in Observation  Once                      Discharge Date: 08/17/22    Medical Problems             Resolved Problems  Date Reviewed: 8/17/2022          Resolved    Hypernatremia 8/17/2022     Resolved by  Janene Garg PA-C                [address] Stay:  · None    Procedures Performed:   · None    Significant Findings / Test Results:   · CMP with creatinine of 1 86, prior creatinine in per chart review 1 74  · One of 2 bottles of admission blood cultures positive for Gram-positive cocci in clusters, Staph coagulase negative  Repeat blood cultures negative  · UA positive for leuks, nitrates and occult blood  Innumerable bacteria and 30-50 wbc's  · Urine culture growing greater than 100 K CFU per mL E coli  · Chest x-ray with no acute cardiopulmonary disease    Incidental Findings:   · Benign atelectasis in the right base on chest x-ray     Test Results Pending at Discharge (will require follow up): · None     Outpatient Tests Requested:  · None    Complications:  None    Reason for Admission:  UTI    Hospital Course:     Maranda Jason is a 80 y o  male patient with past medical history of rheumatoid arthritis and hypertension who originally presented to the hospital on 8/13/2022 due to reported blue fingers and hypotension at his nursing home  Upon presentation to the ED his blood pressure was stable and he was afebrile with no evidence of peripheral cyanosis  Patient did undergo infectious workup in the ED which revealed a grossly positive UA  No leukocytosis on CBC  CMP revealed elevated creatinine to 1 8 although baseline unclear  Lactate negative and blood cultures were collected  Remainder of workup further revealed some hypernatremia  Admitted to medicine and started on IV ceftriaxone and hypotonic IV fluids  Urine culture resulted positive for greater than 100 K CFU per mL E coli  He also was found to have positive blood culture 1/2 bottles for Gram-positive cocci in clusters, although this was felt to be contaminant surveillance cultures were ordered  Repeat cultures remained without growth and patient also remained afebrile throughout hospitalization    Urine culture further results as pansensitive and patient will be transition to p o  Cephalosporin on discharge  Hypernatremia corrected with IV hydration and creatinine down trended to 1 5, this is likely patient's baseline  He was stable to return to [facility] on 08/16/2022  Please see above list of diagnoses and related plan for additional information  Condition at Discharge: fair     Discharge Day Visit / Exam:     Subjective:  Patient has no complaints today  He denies fever chills  Denies dysuria, urgency or frequency  Denies flank pain  Denies nausea or vomiting, denies abdominal pain  Denies chest pain or shortness of breath  Vitals: Blood Pressure: 168/80 (08/17/22 1100)  Pulse: (!) 54 (08/17/22 1100)  Temperature: 98 2 °F (36 8 °C) (08/17/22 0700)  Temp Source: Oral (08/16/22 2236)  Respirations: 18 (08/17/22 1100)  SpO2: 94 % (08/17/22 1100)  Exam:   Physical Exam  Vitals and nursing note reviewed  Constitutional:       General: He is not in acute distress  Appearance: Normal appearance  He is normal weight  He is not ill-appearing or toxic-appearing  HENT:      Head: Normocephalic and atraumatic  Right Ear: External ear normal       Left Ear: External ear normal       Nose: Nose normal       Mouth/Throat:      Mouth: Mucous membranes are moist       Pharynx: Oropharynx is clear  No oropharyngeal exudate  Eyes:      Conjunctiva/sclera: Conjunctivae normal    Cardiovascular:      Rate and Rhythm: Normal rate and regular rhythm  Pulses: Normal pulses  Heart sounds: Normal heart sounds  No murmur heard  No gallop  Pulmonary:      Effort: Pulmonary effort is normal  No respiratory distress  Breath sounds: Normal breath sounds  No stridor  No wheezing, rhonchi or rales  Abdominal:      General: Abdomen is flat  Bowel sounds are normal  There is no distension  Palpations: Abdomen is soft  There is no mass  Tenderness: There is no abdominal tenderness  There is no guarding or rebound  Hernia: No hernia is present  Musculoskeletal:      Cervical back: Normal range of motion  Right lower leg: No edema  Left lower leg: No edema  Skin:     General: Skin is warm and dry  Capillary Refill: Capillary refill takes less than 2 seconds  Coloration: Skin is not jaundiced or pale  Findings: No bruising, erythema or lesion  Neurological:      General: No focal deficit present  Mental Status: He is alert  Mental status is at baseline  Sensory: No sensory deficit  Motor: No weakness  Psychiatric:         Mood and Affect: Mood normal          Thought Content: Thought content normal          Discharge instructions/Information to patient and family:   See after visit summary for information provided to patient and family  Provisions for Follow-Up Care:  See after visit summary for information related to follow-up care and any pertinent home health orders  Disposition:     Other: [facility]    For Discharges to [facility]:   · Not Applicable to this Patient - Not Applicable to this Patient    Planned Readmission: no     Discharge Statement:  I spent 45 minutes discharging the patient  This time was spent on the day of discharge  I had direct contact with the patient on the day of discharge  Greater than 50% of the total time was spent examining patient, answering all patient questions, arranging and discussing plan of care with patient as well as directly providing post-discharge instructions  Additional time then spent on discharge activities  Discharge Medications:  See after visit summary for reconciled discharge medications provided to patient and family        ** Please Note: This note has been constructed using a voice recognition system **

## 2022-08-16 NOTE — PROGRESS NOTES
[facility]  [facility] - Nohemy Michel 9/7/1931, 80 y o  male MRN: [id number]  Unit/Bed#: S -01 Encounter: [id number]  Primary Care Provider: Emily Kenyon MD   Date and time admitted to hospital: 8/13/2022 11:50 AM    Buttock wound  Assessment & Plan  · Patient with scattered wounds of right hip buttock and posterior thigh  · Wound care consulted  · Appreciate their recommendations, wound care instructions copied into discharge instructions section    Positive blood cultures  Assessment & Plan  · Likely contaminant  · 1/2 admission blood cultures positive for GPC, staph coagulase neg  · Pt remains afebrile and HDS with no leukocytosis since admission, no localizing symptoms and UCx with GNR inconsistent with true translocation/urosepsis  · Surveillance cx's all without growth x 24h thusfar    Hypernatremia  Assessment & Plan  · Resolved  · Na 146-->142 in 24 hours following hypotonic IVF on admission, Na stable at 139 today  · Will hold any further IVF to avoid overcorrection  · Continue to trend Na on BMP    UTI (urinary tract infection)  Assessment & Plan  · POA with low BP and "blue fingers"  Pt unable to provide any additional history  Unable to contact facility  · Pt denies LUTS but unreliable  · UA with innumberable bacteria and WBCs and UCx with >100K cfu/ml of E  Coli  · Reasonable to treat with short course of abx  · S/sx reveal pan sensitivity and can transition to PO Cefpodoxime on discharge     Primary hypertension  Assessment & Plan  · BPs initially soft, but now improved  · Resumed Metop tartrate 50 mg BID    Rheumatoid arthritis with positive rheumatoid factor (HCC)  Assessment & Plan  · Continue prednisone 10 mg daily    Late onset Alzheimer's disease with behavioral disturbance (HCC)  Assessment & Plan  · Cont citalopram 20 mg daily, quetiapine 100 mg daily, donepezil 10 mg daily      VTE Pharmacologic Prophylaxis:   Pharmacologic: Enoxaparin (Lovenox)  Mechanical VTE Prophylaxis in Place: Yes    Patient Centered Rounds: I have performed bedside rounds with nursing staff today  Discussions with Specialists or Other Care Team Provider:  Discussed with Nursing and Case Management    Education and Discussions with Family / Patient:  Spoke with daughter, Shae Peralta, over the phone    Time Spent for Care: 30 minutes  More than 50% of total time spent on counseling and coordination of care as described above  Current Length of Stay: 2 day(s)    Current Patient Status: Inpatient   Certification Statement: The patient will continue to require additional inpatient hospital stay due to pending palcement    Discharge Plan: likely tomorrow to [place] pending bed availability    Code Status: Level 3 - DNAR and DNI      Subjective:   Patient has no complaints today  Denies dysuria, urgency or frequency  Denies fever or chills  Denies chest pain or trouble breathing  Reports good appetite he denies nausea, vomiting or diarrhea  Objective:     Vitals:   Temp (24hrs), Av 8 °F (36 6 °C), Min:97 5 °F (36 4 °C), Max:98 2 °F (36 8 °C)    Temp:  [97 5 °F (36 4 °C)-98 2 °F (36 8 °C)] 97 5 °F (36 4 °C)  HR:  [52-54] 52  Resp:  [18] 18  BP: (142-154)/(68-76) 142/68  SpO2:  [92 %-95 %] 95 %  There is no height or weight on file to calculate BMI  Input and Output Summary (last 24 hours): Intake/Output Summary (Last 24 hours) at 2022 1628  Last data filed at 2022 5161  Gross per 24 hour   Intake 120 ml   Output --   Net 120 ml       Physical Exam:     Physical Exam  Vitals and nursing note reviewed  Constitutional:       Appearance: Normal appearance  He is normal weight  HENT:      Head: Normocephalic and atraumatic  Right Ear: External ear normal       Left Ear: External ear normal       Nose: Nose normal       Mouth/Throat:      Mouth: Mucous membranes are moist       Pharynx: Oropharynx is clear  No oropharyngeal exudate     Eyes: General: No scleral icterus  Conjunctiva/sclera: Conjunctivae normal    Cardiovascular:      Rate and Rhythm: Normal rate and regular rhythm  Pulses: Normal pulses  Pulmonary:      Effort: Pulmonary effort is normal  No respiratory distress  Breath sounds: Normal breath sounds  No stridor  No wheezing, rhonchi or rales  Abdominal:      General: Abdomen is flat  Bowel sounds are normal  There is no distension  Palpations: Abdomen is soft  There is no mass  Tenderness: There is no abdominal tenderness  There is no guarding or rebound  Hernia: No hernia is present  Musculoskeletal:      Cervical back: Normal range of motion  Right lower leg: No edema  Left lower leg: No edema  Skin:     General: Skin is warm and dry  Capillary Refill: Capillary refill takes less than 2 seconds  Coloration: Skin is not jaundiced or pale  Findings: No bruising, erythema, lesion or rash  Neurological:      General: No focal deficit present  Mental Status: He is alert  Mental status is at baseline  Cranial Nerves: No cranial nerve deficit  Motor: No weakness  Gait: Gait normal       Comments: Oriented to person and says he's in the hospital but that it is 2007 (he is aware that he cannot remember the year)   Psychiatric:         Mood and Affect: Mood normal          Thought Content:  Thought content normal          Additional Data:     Labs:    Results from last 7 days   Lab Units 08/16/22  0516   WBC Thousand/uL 4 31   HEMOGLOBIN g/dL 10 9*   HEMATOCRIT % 34 3*   PLATELETS Thousands/uL 186   NEUTROS PCT % 39*   LYMPHS PCT % 41   MONOS PCT % 10   EOS PCT % 7*     Results from last 7 days   Lab Units 08/16/22  0516   POTASSIUM mmol/L 4 4   CHLORIDE mmol/L 110*   CO2 mmol/L 24   BUN mg/dL 28*   CREATININE mg/dL 1 56*   CALCIUM mg/dL 8 0*   ALK PHOS U/L 51   ALT U/L 5*   AST U/L 12*     Results from last 7 days   Lab Units 08/13/22  1318   INR  0 96       * I Have Reviewed All Lab Data Listed Above  * Additional Pertinent Lab Tests Reviewed: E[facility] Admission Reviewed    Imaging:    Imaging Reports Reviewed Today Include: none  Imaging Personally Reviewed by Myself Includes:  none    Recent Cultures (last 7 days):     Results from last 7 days   Lab Units 08/15/22  0350 08/14/22  1900 08/13/22  1732 08/13/22  1353 08/13/22  1318   BLOOD CULTURE  No Growth at 24 hrs  No Growth at 24 hrs  No Growth at 24 hrs   --  No Growth at 48 hrs  Staphylococcus coagulase negative*   GRAM STAIN RESULT   --   --   --   --  Gram positive cocci in clusters*   URINE CULTURE   --   --  >100,000 cfu/ml Escherichia coli*  --   --        Last 24 Hours Medication List:   Current Facility-Administered Medications   Medication Dose Route Frequency Provider Last Rate    acetaminophen  650 mg Oral Q6H PRN Lea Alexander MD      cefTRIAXone  1,000 mg Intravenous Q24H Pepito Patel PA-C 1,000 mg (08/16/22 0856)    citalopram  20 mg Oral Daily Lea Alexander MD      clopidogrel  75 mg Oral Daily Lea Alexander MD      donepezil  10 mg Oral HS Lea Alexander MD      enoxaparin  30 mg Subcutaneous Daily Lea Alexander MD      gabapentin  100 mg Oral BID Lea Alexander MD      metoprolol tartrate  50 mg Oral Daily Lea Alexander MD      Vanegas Lines ANTIFUNGAL   Topical BID Alisson Finch MD      ondansetron  4 mg Intravenous Q6H PRN Lea Alexander MD      predniSONE  10 mg Oral Daily Lea Alexander MD      QUEtiapine  100 mg Oral HS Lea Alexander MD          Today, Patient Was Seen By: Pepito Patel PA-C    ** Please Note: Dictation voice to text software may have been used in the creation of this document   **

## 2022-08-16 NOTE — PLAN OF CARE
Problem: PHYSICAL THERAPY ADULT  Goal: Performs mobility at highest level of function for planned discharge setting  See evaluation for individualized goals  Description: Treatment/Interventions: Functional transfer training, LE strengthening/ROM, Therapeutic exercise, Endurance training, Cognitive reorientation, Equipment eval/education, Patient/family training, Gait training, Bed mobility, Spoke to nursing  Equipment Recommended: Brionna Zhao, Wheelchair       See flowsheet documentation for full assessment, interventions and recommendations  Note: Prognosis: Fair  Problem List: Decreased strength, Decreased endurance, Impaired balance, Decreased mobility, Decreased coordination, Decreased cognition, Impaired judgement, Decreased safety awareness, Obesity, Decreased skin integrity, Pain (gait deviations)  Assessment: Assessment: Pt is a 80 y o  male seen for PT evaluation s/p admit to [facility] on 8/13/2022   Order placed for PT  Prior to admission: Pt lived at [facility]  After consult, case management notified this writer that patient "will have to ambulate, and that he uses a wheelchair most times  He does not ambulate much but ambulates to and from the bathroom "  Upon evaluation: Pt requires supervision for bed mobility, intermittent Min assist for transfers including to target surface  Patient was able to ambulate 3 steps forward and 2 steps back with a rolling walker and assistance     Pt's clinical presentation is currently unstable/unpredictable given the functional mobility deficits above, especially (but not limited to) weakness and decreased functional mobility tolerance, coupled with fall risks including impaired balance, impaired judgement, decreased safety awareness, decreased cognition and ? Falls, and combined with medical complications of bradycardia, abnormal renal lab values and abnormal H&H     Recommend trials with wheelchair mobility, progression with rolling walker as appropriate, TherEx  Barriers to Discharge: Decreased caregiver support, Inaccessible home environment     PT Discharge Recommendation: Post acute rehabilitation services (Unless facility can provide physical therapy and increased services for patient upon discharge)    See flowsheet documentation for full assessment

## 2022-08-16 NOTE — ASSESSMENT & PLAN NOTE
· POA with low BP and "blue fingers"  Pt unable to provide any additional history  Unable to contact facility  · Pt denies LUTS but unreliable  · UA with innumberable bacteria and WBCs and UCx with >100K cfu/ml of E  Coli  · Reasonable to treat with short course of abx  · S/sx reveal pan sensitivity and can transition to PO Cefpodoxime on discharge

## 2022-08-16 NOTE — ASSESSMENT & PLAN NOTE
· Resolved  · Na 146-->142 in 24 hours following hypotonic IVF on admission, Na stable at 139 today  · Will hold any further IVF to avoid overcorrection  · Continue to trend Na on BMP

## 2022-08-16 NOTE — CASE MANAGEMENT
Case Management Discharge Planning Note    Patient name Yolie Ramos  [facility] S /S -75 MRN [id number]  : 1931 Date 2022       Current Admission Date: 2022  Current Admission Diagnosis:Late onset Alzheimer's disease with behavioral disturbance [facility])   Patient Active Problem List    Diagnosis Date Noted    Buttock wound 2022    Positive blood cultures 2022    UTI (urinary tract infection) 2022    Hypernatremia 2022    Primary hypertension 10/24/2021    [facility]VD [facility]peripheral vascular disease[facility] ) 10/24/2021    [facility] 2021    Benign prostatic hyperplasia with post-void dribbling 2021    Rheumatoid arthritis with positive rheumatoid factor [facility] ) 2021    Agitation due to dementia ([facility] ) 2021      LOS (days): 2  Geometric Mean LOS (GMLOS) (days): 2 90  Days to GMLOS:0 9     OBJECTIVE:  Risk of Unplanned Readmission Score: 23 02         Current admission status: Inpatient   Preferred Pharmacy:   [address] [address]  Phone: [phone] Fax: [phone]    [id number] [address]  [address] [facility] [address]  Phone: [phone] Fax: [phone]    Primary Care Provider: Alejandra Butler MD    Primary Insurance: [facility]  Secondary Insurance:     DISCHARGE DETAILS:     CM spoke with pt's daughter, Asmita Nice, to assist with planning for DC  Pt has dementia  Epimenio Mings III states pt must be able to ambulate in order to return  At this time pt is in need of additional assistance in order to stand and ambulate  CM reviewed this with daughter and stated pt will need to go to rehab  Daughter is in agreement with this and would like to have a blanket referral made to those facilities in the area and chose from those facilities that have availability       CM made a blanket referral at the request of pt's daughter and will contact daughter tomorrow when facilities have responded  CM will continue to follow

## 2022-08-16 NOTE — ASSESSMENT & PLAN NOTE
· Patient with scattered wounds of right hip buttock and posterior thigh  · Wound care consulted  · Appreciate their recommendations, wound care instructions copied into discharge instructions section

## 2022-08-16 NOTE — DISCHARGE INSTRUCTIONS
You have received your dose of antibiotics today, you may start p o  Cefpodoxime 200 mg twice daily starting tomorrow for 4 days total   Resume remainder of home medications is you were taking prior to admission  Return to the hospital of you experience fever or chills or worsening urinary symptoms i e  Burning with urination, urgency or frequency  Wound/Skin Care Plan:   1  Recommend P-500 low air loss air mattress (ordered)  2  For right buttock unstageable pressure injury and right buttock dermal layer wound medial to unstageable, cleanse with foam cleanser  Apply thin layer of Triad paste (in ostomy drawer) and then cover with bordered foam dressing  Re-apply Triad paste and change foam dressing every 3 days and as needed  If patient heavily incontinent and foam dressing becomes easily saturated with urine or stool, stop foam dressing and only apply Triad paste  If foam dressing no longer being used, apply Triad paste once a day and as needed when Triad paste transfers off    3  For right hip scattered partial thickness wounds, apply thin layer of Triad paste  Cover with a sacral bordered foam dressing  Re-apply Triad paste and change bordered foam dressing every three days and as needed  4  For right posterior thigh wound and for posterior scrotal and perineal wounds, cleanse with foam cleanser  Apply Joana cream twice a day and as needed after incontinence care  5  Continue to assist patient with turning and repositioning  Use wedges  6  Offload heels  7  Moisturize skin each day  8  For skin tears on arms, follow [facility] skin tear protocol  Cleanse skin tears  Apply small sized Dermagran to wounds  Unfold the square and place on wounds  Cover with dry gauze and secure with gauze roll and tape  Change every other day and as needed  9  Wound care nurse follow up

## 2022-08-16 NOTE — ASSESSMENT & PLAN NOTE
· Likely contaminant  · 1/2 admission blood cultures positive for GPC, staph coagulase neg  · Pt remains afebrile and HDS with no leukocytosis since admission, no localizing symptoms and UCx with GNR inconsistent with true translocation/urosepsis  · Surveillance cx's all without growth x 24h thusfar

## 2022-08-16 NOTE — CASE MANAGEMENT
Case Management Discharge Planning Note    Patient name Tayo Pandya  Location S /S -69 MRN [id number]  : 1931 Date 2022       Current Admission Date: 2022  Current Admission Diagnosis:Late onset Alzheimer's disease with behavioral disturbance [facility])   Patient Active Problem List    Diagnosis Date Noted    Buttock wound 2022    Positive blood cultures 2022    UTI (urinary tract infection) 2022    Hypernatremia 2022    Primary hypertension 10/24/2021    [facility]VD (peripheral [facility] disease) [facility] ) 10/24/2021    [facility]    Benign prostatic hyperplasia with post-void dribbling 2021    Rheumatoid arthritis with positive rheumatoid factor ([facility] ) 2021    Agitation due to dementia ([facility] ) 2021      LOS (days): 2  Geometric Mean LOS (GMLOS) (days): 2 90  Days to GMLOS:0 9     OBJECTIVE:  Risk of Unplanned Readmission Score: 22 97         Current admission status: Inpatient   Preferred Pharmacy:   [address] [address]  Phone: [phone] Fax: [phone]    [id number]- [place] [id number]  Phone: [phone] Fax: [phone]    Primary Care Provider: Cam Pacheco MD    Primary Insurance: [facility]  Secondary Insurance:     DISCHARGE DETAILS:    Discharge planning discussed with[de-identified] facility     Comments - Freedom of Choice: CM contacted [facility] to find baseline information regarding pt's abilities  Pt ambulates to and from the bathroom  He uses a WC most times to ambulate however has a RW as well  He mostly lays in bed at the facility  He is able to feed himself  There are times he goes to the dining room however not often  Per Sandy Elder at the facility, pt will have to go to rehb if he is not able to ambulate

## 2022-08-16 NOTE — CONSULTS
Progress Note - Wound   Tayo Pandya 80 y o  male MRN: [id number]  Unit/Bed#: S -84 Encounter: [id number]      Assessment:   Consulted for sacral/right thigh wounds  Patient with hx of dementia  Admitted from nursing home with UTI  Patient dozing upon my arrival  Patient easily aroused and turned himself in bed so I could assess buttock wounds  Patient slept through the wound assessment  Currently, bordered foam dressings covering wounds  Incontinent of stool and urine  Skin tears documented on arms  Right buttock unstageable pressure injury, POA  Measures approximately 2 4 x 2 cm  Moist brown eschar in center of wound  Surrounded by white/yellowish tissue  Unable to see the base of the wound so cannot stage  Lateral to the right unstageable pressure injury wound is another wound on the right buttock that is smaller and is dermal layer  There is white collagen tissue in wound base  This wound measures approximatley 1 x 1 x 0 1cm  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  On right lateral hip is an area of scattered partial thickness skin erosion that measures approximately 3 8 x 0 6 x 0 1 cm  These wounds are POA and of mixed etiology- both pressure and IAD  On right posterior thigh is a partial thickness wound that is horizontal and linear  This wound is POA and etiology is IAD  On posterior scrotum/ posterior perineal area is small degree of denudement due to IAD  Wound/Skin Care Plan:   1  Recommend P-500 low air loss air mattress (ordered)  2  For right buttock unstageable pressure injury and right buttock dermal layer wound medial to unstageable, cleanse with foam cleanser  Apply thin layer of Triad paste (in ostomy drawer) and then cover with bordered foam dressing  Re-apply Triad paste and change foam dressing every 3 days and as needed   If patient heavily incontinent and foam dressing becomes easily saturated with urine or stool, stop foam dressing and only apply Triad paste  If foam dressing no longer being used, apply Triad paste once a day and as needed when Triad paste transfers off    3  For right hip scattered partial thickness wounds, apply thin layer of Triad paste  Cover with a sacral bordered foam dressing  Re-apply Triad paste and change bordered foam dressing every three days and as needed  4  For right posterior thigh wound and for posterior scrotal and perineal wounds, cleanse with foam cleanser  Apply Joana cream twice a day and as needed after incontinence care  5  Continue to assist patient with turning and repositioning  Use wedges  6  Offload heels  7  Moisturize skin each day  8  For skin tears on arms, follow [facility] skin tear protocol  Cleanse skin tears  Apply small sized Dermagran to wounds  Unfold the square and place on wounds  Cover with dry gauze and secure with gauze roll and tape  Change every other day and as needed  9  Wound care nurse follow up  Right hip scattered partial thickness wounds      Right posterior thigh partial thickness wound      Right buttock wounds        Right buttock wounds      Healed right ischial wound      Scrotum/perineal/right posterior thigh wounds      Right hip partial thickness wounds

## 2022-08-17 PROBLEM — E87.0 HYPERNATREMIA: Status: RESOLVED | Noted: 2022-08-13 | Resolved: 2022-08-17

## 2022-08-17 LAB
FLUAV RNA RESP QL NAA+PROBE: NEGATIVE
FLUBV RNA RESP QL NAA+PROBE: NEGATIVE
RSV RNA RESP QL NAA+PROBE: NEGATIVE
SARS-COV-2 RNA RESP QL NAA+PROBE: NEGATIVE

## 2022-08-17 PROCEDURE — 99232 SBSQ HOSP IP/OBS MODERATE 35: CPT | Performed by: PHYSICIAN ASSISTANT

## 2022-08-17 PROCEDURE — 97110 THERAPEUTIC EXERCISES: CPT

## 2022-08-17 PROCEDURE — 97116 GAIT TRAINING THERAPY: CPT

## 2022-08-17 PROCEDURE — 0241U HB NFCT DS VIR RESP RNA 4 TRGT: CPT | Performed by: PHYSICIAN ASSISTANT

## 2022-08-17 RX ORDER — CEFPODOXIME PROXETIL 200 MG/1
200 TABLET, FILM COATED ORAL 2 TIMES DAILY
Qty: 8 TABLET | Refills: 0 | Status: SHIPPED | OUTPATIENT
Start: 2022-08-17 | End: 2022-08-17 | Stop reason: SDUPTHER

## 2022-08-17 RX ORDER — CEFPODOXIME PROXETIL 200 MG/1
200 TABLET, FILM COATED ORAL 2 TIMES DAILY
Qty: 8 TABLET | Refills: 0 | Status: SHIPPED | OUTPATIENT
Start: 2022-08-17 | End: 2022-08-21

## 2022-08-17 RX ADMIN — ENOXAPARIN SODIUM 30 MG: 30 INJECTION SUBCUTANEOUS at 08:50

## 2022-08-17 RX ADMIN — PREDNISONE 10 MG: 10 TABLET ORAL at 08:50

## 2022-08-17 RX ADMIN — METOPROLOL TARTRATE 50 MG: 50 TABLET, FILM COATED ORAL at 08:50

## 2022-08-17 RX ADMIN — CEFTRIAXONE 1000 MG: 1 INJECTION, SOLUTION INTRAVENOUS at 09:46

## 2022-08-17 RX ADMIN — CITALOPRAM HYDROBROMIDE 20 MG: 20 TABLET ORAL at 08:50

## 2022-08-17 RX ADMIN — CLOPIDOGREL BISULFATE 75 MG: 75 TABLET ORAL at 08:50

## 2022-08-17 RX ADMIN — MICONAZOLE NITRATE: 20 CREAM TOPICAL at 09:01

## 2022-08-17 RX ADMIN — DONEPEZIL HYDROCHLORIDE 10 MG: 5 TABLET ORAL at 21:00

## 2022-08-17 RX ADMIN — QUETIAPINE FUMARATE 100 MG: 100 TABLET ORAL at 21:00

## 2022-08-17 RX ADMIN — GABAPENTIN 100 MG: 100 CAPSULE ORAL at 17:25

## 2022-08-17 RX ADMIN — MICONAZOLE NITRATE: 20 CREAM TOPICAL at 17:25

## 2022-08-17 NOTE — CASE MANAGEMENT
Case Management Discharge Planning Note    Patient name Patricia Martinez S /S -36 MRN [id number]  : 1931 Date 2022       Current Admission Date: 2022  Current Admission Diagnosis:UTI (urinary tract infection)   Patient Active Problem List    Diagnosis Date Noted    Buttock wound 2022    Positive blood cultures 2022    UTI (urinary tract infection) 2022    Primary hypertension 10/24/2021    [facility]VD [facility]peripheral [facility] disease) [facility] ) 10/24/2021    Late onset [facility]'s [facility] with [facility] 2021    Benign prostatic hyperplasia with post-void dribbling 2021    Rheumatoid arthritis with positive rheumatoid factor ([facility] ) 2021    Agitation due to dementia ([facility] ) 2021      LOS (days): 3  Geometric Mean LOS (GMLOS) (days): 2 90  Days to GMLOS:-0 1     OBJECTIVE:  Risk of Unplanned Readmission Score: 21 17         Current admission status: Inpatient   Preferred Pharmacy:   [address], [address] [place], [place] [address]  Phone: [phone] Fax: [phone]    [facility] [id number] [address] [address]  Phone: [phone] Fax: [phone]    Primary Care Provider: Danny Aase, MD    Primary Insurance: [facility]  Secondary Insurance:     DISCHARGE DETAILS:        CM received call from Lio Frederick at [facility] and she has decided that after reviewing pt's PT notes, she would like to see pt go to rehab prior to returning to [facility]  She stated they work well with [facility] and it is the closest facility to them  Referral has been made  Cm contacted pt's daughter Caridad Valentino to review the conversation had with the building  A message was left for her to return my call  CM will contact pt's daughter tomorrow as well  Nurse, daughter, SLIM AP aware of change in DCP

## 2022-08-17 NOTE — ASSESSMENT & PLAN NOTE
· POA with low BP and "blue fingers"  Pt unable to provide any additional history  Unable to contact facility  · Pt denies LUTS but unreliable    · Stable for discharge   · Complete course of [facility] outpatient   · Unfortunately unable to return to [facility] due to their ability to care for patient  · Rehab placement pending

## 2022-08-17 NOTE — ASSESSMENT & PLAN NOTE
· Likely contaminant  · 1/2 admission blood cultures positive for GPC, staph coagulase neg  · Repeat cultures negative   · No further work up or treatment needed

## 2022-08-17 NOTE — PHYSICAL THERAPY NOTE
PHYSICAL THERAPY NOTE    Patient Name: Nhan CAMPOSK Date: 22 0826   PT Last Visit   PT Visit Date 22   Note Type   Note Type Treatment   Pain Assessment   Pain Assessment Tool 0-10   Pain Score No Pain   Restrictions/Precautions   Other Precautions Chair Alarm; Bed Alarm;Cognitive   General   Family/Caregiver Present No   Subjective   Subjective Patient seated OOB in recliner and is agreeable to participate in therapy session  Pt identifers obtained from name &   Bed Mobility   Supine to Sit Unable to assess   Sit to Supine Unable to assess   Additional Comments Patient seated OOB in recliner post session with chair alarm engaged, call bell and belongings in reach  Transfers   Sit to Stand 5  Supervision   Additional items Assist x 1; Armrests; Increased time required;Verbal cues   Stand to Sit 5  Supervision   Additional items Assist x 1; Armrests; Increased time required;Verbal cues   Ambulation/Elevation   Gait pattern Decreased R stance; Antalgic; Excessively slow; Inconsistent jeanne   Gait Assistance 4  Minimal assist   Additional items Assist x 1;Verbal cues   Assistive Device Rolling walker   Distance 30' x1   Balance   Static Sitting Good   Static Standing Fair -   Ambulatory Poor +   Endurance Deficit   Endurance Deficit Yes   Endurance Deficit Description limited activity tolerance and ambulation distance   Activity Tolerance   Activity Tolerance Patient limited by fatigue   Nurse Made Aware Spoke to Fran Gutierrez RN   Exercises   Hip Abduction Sitting;10 reps;AROM; Bilateral   Hip Adduction Sitting;10 reps;AROM; Bilateral  (pillow squeeze)   Knee AROM Long Arc Quad Sitting;10 reps;AROM; Bilateral   Ankle Pumps Sitting;10 reps;AROM; Bilateral   Marching Sitting;10 reps;AROM; Bilateral   Assessment   Prognosis Fair   Problem List Decreased strength;Decreased endurance; Impaired balance;Decreased mobility; Decreased coordination;Decreased cognition; Impaired judgement;Decreased safety awareness; Obesity; Decreased skin integrity;Pain   Assessment Patient agreeable to participate in therapy session  Patient demonstrates progression with functional mobility with supervision for sit<>stand with increased time and instruction for hand placement and controlled descend to chair  Patient able to ambulate increased gait distance with roller walker and min ax1 however distance remains limited by fatigue and complaints of knee pain  Pt participated in seated B LE exercise program with AROM and >75% input for form and pace  Continue to focus on OOB mobility with progression transfers and ambulation as appropriate and able  Barriers to Discharge Decreased caregiver support; Inaccessible home environment   Goals   Patient Goals none stated when asked   STG Expiration Date 08/26/22   PT Treatment Day 1   Plan   Treatment/Interventions Functional transfer training;LE strengthening/ROM; Therapeutic exercise; Endurance training;Cognitive reorientation;Patient/family training;Bed mobility; Equipment eval/education;Gait training;Spoke to nursing   PT Frequency 2-3x/wk   Recommendation   PT Discharge Recommendation Post acute rehabilitation services  (Unless facility can provide physical therapy and increased services for patient upon discharge)   Equipment Recommended Walker; Wheelchair   AM-PAC Basic Mobility Inpatient   Turning in Bed Without Bedrails 3   Lying on Back to Sitting on Edge of Flat Bed 3   Moving Bed to Chair 3   Standing Up From Chair 3   Walk in Room 2   Climb 3-5 Stairs 1   Basic Mobility Inpatient Raw Score 15   Basic Mobility Standardized Score 36 97   Highest Level Of Mobility   JH-HLM Goal 4: Move to chair/commode   JH-HLM Achieved 7: Walk 25 feet or more   Education   Education Provided Mobility training;Home exercise program;Assistive device   Patient Reinforcement needed   End of Consult Patient Position at End of Consult Bedside chair;Bed/Chair alarm activated; All needs within reach       The patient's AM-PAC Basic Mobility Inpatient Short Form Raw Score is 15  A Raw score of less than or equal to 16 suggests the patient may benefit from discharge to post-acute rehabilitation services  Please also refer to the recommendation of the Physical Therapist for safe discharge planning        Emily Guevara PTA

## 2022-08-17 NOTE — PLAN OF CARE
Problem: PHYSICAL THERAPY ADULT  Goal: Performs mobility at highest level of function for planned discharge setting  See evaluation for individualized goals  Description: Treatment/Interventions: Functional transfer training, LE strengthening/ROM, Therapeutic exercise, Endurance training, Cognitive reorientation, Equipment eval/education, Patient/family training, Gait training, Bed mobility, Spoke to nursing  Equipment Recommended: Trudy Leary, Wheelchair       See flowsheet documentation for full assessment, interventions and recommendations  Outcome: Progressing  Note: Prognosis: Fair  Problem List: Decreased strength, Decreased endurance, Impaired balance, Decreased mobility, Decreased coordination, Decreased cognition, Impaired judgement, Decreased safety awareness, Obesity, Decreased skin integrity, Pain  Assessment: Patient agreeable to participate in therapy session  Patient demonstrates progression with functional mobility with supervision for sit<>stand with increased time and instruction for hand placement and controlled descend to chair  Patient able to ambulate increased gait distance with roller walker and min ax1 however distance remains limited by fatigue and complaints of knee pain  Pt participated in seated B LE exercise program with AROM and >75% input for form and pace  Continue to focus on OOB mobility with progression transfers and ambulation as appropriate and able  Barriers to Discharge: Decreased caregiver support, Inaccessible home environment     PT Discharge Recommendation: Post acute rehabilitation services (Unless facility can provide physical therapy and increased services for patient upon discharge)    See flowsheet documentation for full assessment

## 2022-08-17 NOTE — CASE MANAGEMENT
Case Management Discharge Planning Note    Patient name Mindy Be  Location S /S -96 MRN [id number]  : 1931 Date 2022       Current Admission Date: 2022  Current Admission Diagnosis:UTI (urinary tract infection)   Patient Active Problem List    Diagnosis Date Noted    Buttock wound 2022    Positive blood cultures 2022    UTI (urinary tract infection) 2022    Primary hypertension 10/24/2021    PVD (peripheral vascular disease) ([facility] ) 10/24/2021    [facility] 2021    Benign prostatic hyperplasia with post-void dribbling 2021    Rheumatoid arthritis with positive rheumatoid factor [facility] ) 2021    Agitation due to dementia ([facility] ) 2021      LOS (days): 3  Geometric Mean LOS (GMLOS) (days): 2 90  Days to GMLOS:-0 1     OBJECTIVE:  Risk of Unplanned Readmission Score: 23 28         Current admission status: Inpatient   [facility]   [facility] [address] [place] [address], [address] [place] [id number]  Phone: [phone] Fax: [phone]    [id number]- [address] [address]  Phone: [phone] Fax: [phone]    Primary Care Provider: Debi Cain MD    Primary Insurance: [facility]  Secondary Insurance:     DISCHARGE DETAILS:     CM spoke with Carissa Jj , Rene Mtz, who stated pt is able to return to the facility as long as he is able to ambulate  CM reviewed he was ambulating 30 feet and she stated this would be enough  CM reviewed pt is supervision for sit to stand and Rene Mtz states she is able to have pt return instead of going to rehab  PT notes where also faxed to the facility to Luis at [phone]      CM spoke with pt's daughter, Roselyn Van, who stated she would not be able to transport pt as she lives over 3 hours away and requested transportation be arranged for pt   CM made referral via [facility] for [facility] requesting a 1530  time  [facility] will transport pt back to [facility][1] at 1630  Nursing, facility and daughter aware of DC arrangements  IMM reviewed with daughter, daughter agrees with discharge determination          Accepting Facility Name, [facility] : [facility][1]  Receiving Facility/Agency Phone Number: [phone]

## 2022-08-17 NOTE — PROGRESS NOTES
[facility]  Progress Note - Clement De La O 9/7/1931, 80 y o  male MRN: [id number]  Unit/Bed#: S -01 Encounter: [id number]  Primary Care Provider: Leslie Coombs MD   Date and time admitted to hospital: 8/13/2022 11:50 AM    * UTI (urinary tract infection)  Assessment & Plan  · POA with low BP and "blue fingers"  Pt unable to provide any additional history  Unable to contact facility  · Pt denies LUTS but unreliable  · Stable for discharge   · Complete course of Vantin outpatient   · Unfortunately unable to return to [facility] due to their ability to care for patient  · Rehab placement pending    Positive blood cultures  Assessment & Plan  · Likely contaminant  · 1/2 admission blood cultures positive for GPC, staph coagulase neg  · Repeat cultures negative   · No further work up or treatment needed    Buttock wound  Assessment & Plan  · All wound present on admission  · Patient with scattered wounds of right hip buttock and posterior thigh  · Wound care consulted  · Appreciate their recommendations, wound care instructions copied into discharge instructions section    Late onset Alzheimer's disease with behavioral disturbance (HCC)  Assessment & Plan  · Stable   · Continue citalopram 20 mg daily, quetiapine 100 mg daily, donepezil 10 mg daily    Primary hypertension  Assessment & Plan  · BPs initially soft, but now improved  · Resumed Metoprolol tartrate 50 mg BID    Rheumatoid arthritis with positive rheumatoid factor (HCC)  Assessment & Plan  · Continue prednisone 10 mg daily        VTE Pharmacologic Prophylaxis: VTE Score: 5 High Risk (Score >/= 5) - Pharmacological DVT Prophylaxis Ordered: enoxaparin (Lovenox)  Sequential Compression Devices Ordered  Patient Centered Rounds: I performed bedside rounds with nursing staff today    Discussions with Specialists or Other Care Team Provider: Discussed with RN, JORGE    Education and Discussions with Family / Patient: CM communicated discharge planning with daughter   Time Spent for Care: 30 minutes  More than 50% of total time spent on counseling and coordination of care as described above  Current Length of Stay: 3 day(s)  Current Patient Status: Inpatient   Certification Statement: The patient will continue to require additional inpatient hospital stay due to pending safe discharge plan   Discharge Plan: Anticipate discharge in 24-48 hrs to rehab facility  Code Status: Level 3 - DNAR and DNI    Subjective:   Patient without complaints today  Hoping for discharge soon  Objective:     Vitals:   Temp (24hrs), Av 5 °F (36 9 °C), Min:98 2 °F (36 8 °C), Max:99 °F (37 2 °C)    Temp:  [98 2 °F (36 8 °C)-99 °F (37 2 °C)] 99 °F (37 2 °C)  HR:  [54-58] 55  Resp:  [18] 18  BP: (147-183)/() 147/100  SpO2:  [93 %-96 %] 96 %  There is no height or weight on file to calculate BMI  Input and Output Summary (last 24 hours): Intake/Output Summary (Last 24 hours) at 2022 1609  Last data filed at 2022 1411  Gross per 24 hour   Intake 300 ml   Output --   Net 300 ml       Physical Exam:   Physical Exam  Vitals and nursing note reviewed  Constitutional:       General: He is not in acute distress  Appearance: Normal appearance  He is well-developed and normal weight  He is not ill-appearing or diaphoretic  HENT:      Head: Normocephalic and atraumatic  Mouth/Throat:      Mouth: Mucous membranes are moist    Eyes:      General: No scleral icterus  Conjunctiva/sclera: Conjunctivae normal       Pupils: Pupils are equal, round, and reactive to light  Cardiovascular:      Rate and Rhythm: Normal rate and regular rhythm  Pulses: Normal pulses  Heart sounds: Normal heart sounds, S1 normal and S2 normal  No murmur heard  No systolic murmur is present  No diastolic murmur is present  No gallop  No S3 or S4 sounds     Pulmonary:      Effort: Pulmonary effort is normal  No accessory muscle usage or respiratory distress  Breath sounds: Normal breath sounds  No stridor  No decreased breath sounds, wheezing, rhonchi or rales  Chest:      Chest wall: No tenderness  Abdominal:      General: Bowel sounds are normal  There is no distension  Palpations: Abdomen is soft  Tenderness: There is no abdominal tenderness  There is no guarding  Musculoskeletal:      Cervical back: Neck supple  Right lower leg: No edema  Left lower leg: No edema  Skin:     General: Skin is warm and dry  Coloration: Skin is not jaundiced  Findings: Wound present  Neurological:      General: No focal deficit present  Mental Status: He is alert  Mental status is at baseline  Motor: No tremor or seizure activity  Psychiatric:         Behavior: Behavior is cooperative  Additional Data:     Labs:  Results from last 7 days   Lab Units 08/16/22  0516   WBC Thousand/uL 4 31   HEMOGLOBIN g/dL 10 9*   HEMATOCRIT % 34 3*   PLATELETS Thousands/uL 186   NEUTROS PCT % 39*   LYMPHS PCT % 41   MONOS PCT % 10   EOS PCT % 7*     Results from last 7 days   Lab Units 08/16/22  0516   SODIUM mmol/L 139   POTASSIUM mmol/L 4 4   CHLORIDE mmol/L 110*   CO2 mmol/L 24   BUN mg/dL 28*   CREATININE mg/dL 1 56*   ANION GAP mmol/L 5   CALCIUM mg/dL 8 0*   ALBUMIN g/dL 3 0*   TOTAL BILIRUBIN mg/dL 0 41   ALK PHOS U/L 51   ALT U/L 5*   AST U/L 12*   GLUCOSE RANDOM mg/dL 81     Results from last 7 days   Lab Units 08/13/22  1318   INR  0 96             Results from last 7 days   Lab Units 08/13/22  1318   LACTIC ACID mmol/L 1 8       Lines/Drains:  Invasive Devices  Report    Peripheral Intravenous Line  Duration           Peripheral IV 08/17/22 Right;Ventral (anterior) Forearm <1 day                      Imaging: No pertinent imaging reviewed      Recent Cultures (last 7 days):   Results from last 7 days   Lab Units 08/15/22  0350 08/14/22  1900 08/13/22  1732 08/13/22  1353 08/13/22  1318   BLOOD CULTURE  No Growth at 48 hrs  No Growth at 48 hrs  No Growth at 48 hrs  --  No Growth at 72 hrs  Staphylococcus coagulase negative*   GRAM STAIN RESULT   --   --   --   --  Gram positive cocci in clusters*   URINE CULTURE   --   --  >100,000 cfu/ml Escherichia coli*  --   --        Last 24 Hours Medication List:   Current Facility-Administered Medications   Medication Dose Route Frequency Provider Last Rate    acetaminophen  650 mg Oral Q6H PRN Cee Campos MD      cefTRIAXone  1,000 mg Intravenous Q24H Sean Bell PA-C 1,000 mg (08/17/22 0946)    citalopram  20 mg Oral Daily Cee Campos MD      clopidogrel  75 mg Oral Daily Cee Campos MD      donepezil  10 mg Oral HS Cee Campos MD      enoxaparin  30 mg Subcutaneous Daily Cee Campos MD      gabapentin  100 mg Oral BID Cee Campos MD      metoprolol tartrate  50 mg Oral Daily MD Riky Estevez ANTIFUNGAL   Topical BID Anaya Schmidt MD      ondansetron  4 mg Intravenous Q6H PRN Cee Cmapos MD      predniSONE  10 mg Oral Daily Cee Campos MD      QUEtiapine  100 mg Oral HS Cee Campos MD          Today, Patient Was Seen By: Sadie Knox PA-C    **Please Note: This note may have been constructed using a voice recognition system  **

## 2022-08-17 NOTE — PLAN OF CARE
Problem: Potential for Falls  Goal: Patient will remain free of falls  Description: INTERVENTIONS:  - Educate patient/family on patient safety including physical limitations  - Instruct patient to call for assistance with activity   - Consult OT/PT to assist with strengthening/mobility   - Keep Call bell within reach  - Keep bed low and locked with side rails adjusted as appropriate  - Keep care items and personal belongings within reach  - Initiate and maintain comfort rounds  - Make Fall Risk Sign visible to staff  - Offer Toileting every  Hours, in advance of need  - Initiate/Maintain alarm  - Obtain necessary fall risk management equipment  - Apply yellow socks and bracelet for high fall risk patients  - Consider moving patient to room near nurses station  Outcome: Progressing     Problem: Prexisting or High Potential for Compromised Skin Integrity  Goal: Skin integrity is maintained or improved  Description: INTERVENTIONS:  - Identify patients at risk for skin breakdown  - Assess and monitor skin integrity  - Assess and monitor nutrition and hydration status  - Monitor labs   - Assess for incontinence   - Turn and reposition patient  - Assist with mobility/ambulation  - Relieve pressure over bony prominences  - Avoid friction and shearing  - Provide appropriate hygiene as needed including keeping skin clean and dry  - Evaluate need for skin moisturizer/barrier cream  - Collaborate with interdisciplinary team   - Patient/family teaching  - Consider wound care consult   Outcome: Progressing     Problem: MOBILITY - ADULT  Goal: Maintain or return to baseline ADL function  Description: INTERVENTIONS:  -  Assess patient's ability to carry out ADLs; assess patient's baseline for ADL function and identify physical deficits which impact ability to perform ADLs (bathing, care of mouth/teeth, toileting, grooming, dressing, etc )  - Assess/evaluate cause of self-care deficits   - Assess range of motion  - Assess patient's mobility; develop plan if impaired  - Assess patient's need for assistive devices and provide as appropriate  - Encourage maximum independence but intervene and supervise when necessary  - Involve family in performance of ADLs  - Assess for home care needs following discharge   - Consider OT consult to assist with ADL evaluation and planning for discharge  - Provide patient education as appropriate  Outcome: Progressing  Goal: Maintains/Returns to pre admission functional level  Description: INTERVENTIONS:  - Perform BMAT or MOVE assessment daily    - Set and communicate daily mobility goal to care team and patient/family/caregiver  - Collaborate with rehabilitation services on mobility goals if consulted  - Perform Range of Motion  times a day  - Reposition patient every  hours    - Dangle patient  times a day  - Stand patient  times a day  - Ambulate patient  times a day  - Out of bed to chair  times a day   - Out of bed for meals  times a day  - Out of bed for toileting  - Record patient progress and toleration of activity level   Outcome: Progressing

## 2022-08-17 NOTE — ASSESSMENT & PLAN NOTE
· All wound present on admission     · Patient with scattered wounds of right hip buttock and posterior thigh  · Wound care consulted  · Appreciate their recommendations, wound care instructions copied into discharge instructions section

## 2022-08-17 NOTE — ASSESSMENT & PLAN NOTE
· POA with low BP and "blue fingers"  Pt unable to provide any additional history  Unable to contact facility  · Pt denies LUTS but unreliable    · Stable for discharge   · Complete course of Vantin outpatient

## 2022-08-17 NOTE — CASE MANAGEMENT
Case Management Progress Note    Patient name Dominique Aguilar  Location S /S -97 MRN [id number]  : 1931 Date 2022       LOS (days): 3  Geometric Mean LOS (GMLOS) (days): 2 90  Days to GMLOS:0 1        OBJECTIVE:        Current admission status: Inpatient  Preferred Pharmacy:   [facility] [address] [id number]  Phone: [phone] Fax: [phone]    [id number] [place], [address]  [place]  [address] [place] [address] [address]  Phone: [phone] Fax: [phone]    Primary Care Provider: Jacinto Kaufman MD    Primary Insurance: [employer] Letters REP  Secondary Insurance:     PROGRESS NOTE:    CM called [facility] nursing office in order to review and discuss plans for DC  Cm had to leave message on  requesting a return call to review pt's status  CM will continue to follow

## 2022-08-18 LAB — BACTERIA BLD CULT: NORMAL

## 2022-08-18 PROCEDURE — 99232 SBSQ HOSP IP/OBS MODERATE 35: CPT | Performed by: PHYSICIAN ASSISTANT

## 2022-08-18 RX ORDER — CEFPODOXIME PROXETIL 200 MG/1
400 TABLET, FILM COATED ORAL 2 TIMES DAILY WITH MEALS
Status: DISCONTINUED | OUTPATIENT
Start: 2022-08-18 | End: 2022-08-20 | Stop reason: HOSPADM

## 2022-08-18 RX ADMIN — GABAPENTIN 100 MG: 100 CAPSULE ORAL at 17:33

## 2022-08-18 RX ADMIN — CITALOPRAM HYDROBROMIDE 20 MG: 20 TABLET ORAL at 10:00

## 2022-08-18 RX ADMIN — MICONAZOLE NITRATE: 20 CREAM TOPICAL at 17:34

## 2022-08-18 RX ADMIN — QUETIAPINE FUMARATE 100 MG: 100 TABLET ORAL at 21:43

## 2022-08-18 RX ADMIN — DONEPEZIL HYDROCHLORIDE 10 MG: 5 TABLET ORAL at 21:43

## 2022-08-18 RX ADMIN — CEFPODOXIME PROXETIL 400 MG: 200 TABLET, FILM COATED ORAL at 17:34

## 2022-08-18 RX ADMIN — ENOXAPARIN SODIUM 30 MG: 30 INJECTION SUBCUTANEOUS at 10:00

## 2022-08-18 RX ADMIN — CLOPIDOGREL BISULFATE 75 MG: 75 TABLET ORAL at 10:00

## 2022-08-18 RX ADMIN — MICONAZOLE NITRATE: 20 CREAM TOPICAL at 10:06

## 2022-08-18 RX ADMIN — METOPROLOL TARTRATE 50 MG: 50 TABLET, FILM COATED ORAL at 10:00

## 2022-08-18 RX ADMIN — CEFTRIAXONE 1000 MG: 1 INJECTION, SOLUTION INTRAVENOUS at 10:06

## 2022-08-18 RX ADMIN — PREDNISONE 10 MG: 10 TABLET ORAL at 10:00

## 2022-08-18 RX ADMIN — GABAPENTIN 100 MG: 100 CAPSULE ORAL at 10:00

## 2022-08-18 NOTE — PROGRESS NOTES
[facility]  Progress Note - Daniela Caal 9/7/1931, 80 y o  male MRN: [id number]  Unit/Bed#: S -01 Encounter: [id number]  Primary Care Provider: Leon Ramos MD   Date and time admitted to hospital: 8/13/2022 11:50 AM    * UTI (urinary tract infection)  Assessment & Plan  · POA with low BP and "blue fingers"  Pt unable to provide any additional history  Unable to contact facility  · Pt denies LUTS but unreliable  · Stable for discharge   · Complete course of Vantin    · Unfortunately unable to return to [facility] due to their ability to care for patient  · Rehab placement pending    Positive blood cultures  Assessment & Plan  · Likely contaminant  · 1/2 admission blood cultures positive for GPC, staph coagulase neg  · Repeat cultures negative   · No further work up or treatment needed    Buttock wound  Assessment & Plan  · All wound present on admission  · Patient with scattered wounds of right hip buttock and posterior thigh  · Wound care consulted  · Appreciate their recommendations, wound care instructions copied into discharge instructions section    Late onset Alzheimer's disease with behavioral disturbance (HCC)  Assessment & Plan  · Stable   · Continue citalopram 20 mg daily, quetiapine 100 mg daily, donepezil 10 mg daily    Primary hypertension  Assessment & Plan  · BPs initially soft, but now improved  · Resumed Metoprolol tartrate 50 mg BID    Rheumatoid arthritis with positive rheumatoid factor (HCC)  Assessment & Plan  · Continue prednisone 10 mg daily        VTE Pharmacologic Prophylaxis: VTE Score: 5 High Risk (Score >/= 5) - Pharmacological DVT Prophylaxis Ordered: enoxaparin (Lovenox)  Sequential Compression Devices Ordered  Patient Centered Rounds: I performed bedside rounds with nursing staff today    Discussions with Specialists or Other Care Team Provider: Discussed with RN, CM    Education and Discussions with Family / Patient: CM to discuss discharge plan when available   Time Spent for Care: 30 minutes  More than 50% of total time spent on counseling and coordination of care as described above  Current Length of Stay: 4 day(s)  Current Patient Status: Inpatient   Certification Statement: The patient will continue to require additional inpatient hospital stay due to pending auth   Discharge Plan: Anticipate discharge in 24-48 hrs to rehab facility  Code Status: Level 3 - DNAR and DNI    Subjective:   Patient denies any new complaints today  Understands we are waiting for insurance auth  Objective:     Vitals:   Temp (24hrs), Av 7 °F (37 1 °C), Min:98 5 °F (36 9 °C), Max:99 °F (37 2 °C)    Temp:  [98 5 °F (36 9 °C)-99 °F (37 2 °C)] 98 7 °F (37 1 °C)  HR:  [55-83] 61  Resp:  [18] 18  BP: (130-179)/() 130/63  SpO2:  [93 %-96 %] 94 %  There is no height or weight on file to calculate BMI  Input and Output Summary (last 24 hours): Intake/Output Summary (Last 24 hours) at 2022 1109  Last data filed at 2022 1757  Gross per 24 hour   Intake 300 ml   Output --   Net 300 ml       Physical Exam:   Physical Exam  Constitutional:       General: He is not in acute distress  Appearance: Normal appearance  He is normal weight  He is not ill-appearing or diaphoretic  HENT:      Head: Normocephalic and atraumatic  Mouth/Throat:      Mouth: Mucous membranes are moist    Eyes:      General: No scleral icterus  Pupils: Pupils are equal, round, and reactive to light  Cardiovascular:      Rate and Rhythm: Normal rate and regular rhythm  Pulses: Normal pulses  Heart sounds: Normal heart sounds, S1 normal and S2 normal  No murmur heard  No systolic murmur is present  No diastolic murmur is present  No gallop  No S3 or S4 sounds  Pulmonary:      Effort: Pulmonary effort is normal  No accessory muscle usage or respiratory distress  Breath sounds: Normal breath sounds  No stridor   No decreased breath sounds, wheezing, rhonchi or rales  Chest:      Chest wall: No tenderness  Abdominal:      General: Bowel sounds are normal  There is no distension  Palpations: Abdomen is soft  Tenderness: There is no abdominal tenderness  There is no guarding  Musculoskeletal:      Right lower leg: No edema  Left lower leg: No edema  Skin:     General: Skin is warm and dry  Coloration: Skin is not jaundiced  Neurological:      General: No focal deficit present  Mental Status: He is alert  Mental status is at baseline  Motor: No tremor or seizure activity  Psychiatric:         Behavior: Behavior is cooperative  Additional Data:     Labs:  Results from last 7 days   Lab Units 08/16/22  0516   WBC Thousand/uL 4 31   HEMOGLOBIN g/dL 10 9*   HEMATOCRIT % 34 3*   PLATELETS Thousands/uL 186   NEUTROS PCT % 39*   LYMPHS PCT % 41   MONOS PCT % 10   EOS PCT % 7*     Results from last 7 days   Lab Units 08/16/22  0516   SODIUM mmol/L 139   POTASSIUM mmol/L 4 4   CHLORIDE mmol/L 110*   CO2 mmol/L 24   BUN mg/dL 28*   CREATININE mg/dL 1 56*   ANION GAP mmol/L 5   CALCIUM mg/dL 8 0*   ALBUMIN g/dL 3 0*   TOTAL BILIRUBIN mg/dL 0 41   ALK PHOS U/L 51   ALT U/L 5*   AST U/L 12*   GLUCOSE RANDOM mg/dL 81     Results from last 7 days   Lab Units 08/13/22  1318   INR  0 96             Results from last 7 days   Lab Units 08/13/22  1318   LACTIC ACID mmol/L 1 8       Lines/Drains:  Invasive Devices  Report    Peripheral Intravenous Line  Duration           Peripheral IV 08/17/22 Right;Ventral (anterior) Forearm 1 day                      Imaging: No pertinent imaging reviewed  Recent Cultures (last 7 days):   Results from last 7 days   Lab Units 08/15/22  0350 08/14/22  1900 08/13/22  1732 08/13/22  1353 08/13/22  1318   BLOOD CULTURE  No Growth at 72 hrs  No Growth at 72 hrs  No Growth at 72 hrs   --  No Growth After 4 Days   Staphylococcus coagulase negative*   GRAM STAIN RESULT   --   --   --   -- Gram positive cocci in clusters*   URINE CULTURE   --   --  >100,000 cfu/ml Escherichia coli*  --   --        Last 24 Hours Medication List:   Current Facility-Administered Medications   Medication Dose Route Frequency Provider Last Rate    acetaminophen  650 mg Oral Q6H PRN Gem Velazquez MD      cefpodoxime  400 mg Oral BID With Meals Andre Pate PA-C      citalopram  20 mg Oral Daily Gme Velazquez MD      clopidogrel  75 mg Oral Daily Gem Velazquez MD      donepezil  10 mg Oral HS Gem Velazquez MD      enoxaparin  30 mg Subcutaneous Daily Gem Velazquez MD      gabapentin  100 mg Oral BID Gem Velazquez MD      metoprolol tartrate  50 mg Oral Daily Gem Velazquez MD      Leory Brow ANTIFUNGAL   Topical BID King Pedro Luis MD      ondansetron  4 mg Intravenous Q6H PRN Gem Velazquez MD      predniSONE  10 mg Oral Daily Gem Velazquez MD      QUEtiapine  100 mg Oral HS Gem Velazquez MD          Today, Patient Was Seen By: Carey Marsh PA-C    **Please Note: This note may have been constructed using a voice recognition system  **

## 2022-08-18 NOTE — CASE MANAGEMENT
Case Management Discharge Planning Note    Patient name Oliva Martinez S /S -89 MRN [id number]  : 1931 Date 2022       Current Admission Date: 2022  Current Admission Diagnosis:UTI (urinary tract infection)   Patient Active Problem List    Diagnosis Date Noted    Buttock wound 2022    Positive blood cultures 2022    UTI (urinary tract infection) 2022    Primary hypertension 10/24/2021    PVD (peripheral vascular disease) ([facility] [address] ) 10/24/2021    Late onset [facility]'s disease with behavioral disturbance ([facility] ) 2021    Benign prostatic hyperplasia with post-void dribbling 2021    Rheumatoid arthritis with positive rheumatoid factor ([place] [address] ) 2021    Agitation due to dementia ([facility] ) 2021      LOS (days): 4  Geometric Mean LOS (GMLOS) (days): 2 90  Days to GMLOS:-1 1     OBJECTIVE:  Risk of Unplanned Readmission Score: 21 47         Current admission status: Inpatient   Preferred Pharmacy:   [facility] [address] [place] [address], [address] [place] [id number]  Phone: [phone] Fax: [phone]    [facility]/pharmacy #[id number]- [place] [address]  Phone: [phone] Fax: [phone]    Primary Care Provider: Wilda Huerta MD    Primary Insurance: [facility] [facility] REP  Secondary Insurance:     [facility] Number: submitted SNF auth request to [employer] via [employer]  pending ref #[id number] for [facility]  NPI: [id number]  Dr Sharmaine Au  NPI: [id number]   Clinicals attached via [employer]

## 2022-08-18 NOTE — PLAN OF CARE
Problem: Potential for Falls  Goal: Patient will remain free of falls  Description: INTERVENTIONS:  - Educate patient/family on patient safety including physical limitations  - Instruct patient to call for assistance with activity   - Consult OT/PT to assist with strengthening/mobility   - Keep Call bell within reach  - Keep bed low and locked with side rails adjusted as appropriate  - Keep care items and personal belongings within reach  - Initiate and maintain comfort rounds  - Make Fall Risk Sign visible to staff  - Offer Toileting every 2 Hours, in advance of need  - Initiate/Maintain bed alarm  - Obtain necessary fall risk management equipment  - Apply yellow socks and bracelet for high fall risk patients  - Consider moving patient to room near nurses station  Outcome: Progressing     Problem: Prexisting or High Potential for Compromised Skin Integrity  Goal: Skin integrity is maintained or improved  Description: INTERVENTIONS:  - Identify patients at risk for skin breakdown  - Assess and monitor skin integrity  - Assess and monitor nutrition and hydration status  - Monitor labs   - Assess for incontinence   - Turn and reposition patient  - Assist with mobility/ambulation  - Relieve pressure over bony prominences  - Avoid friction and shearing  - Provide appropriate hygiene as needed including keeping skin clean and dry  - Evaluate need for skin moisturizer/barrier cream  - Collaborate with interdisciplinary team   - Patient/family teaching  - Consider wound care consult   Outcome: Progressing     Problem: MOBILITY - ADULT  Goal: Maintain or return to baseline ADL function  Description: INTERVENTIONS:  -  Assess patient's ability to carry out ADLs; assess patient's baseline for ADL function and identify physical deficits which impact ability to perform ADLs (bathing, care of mouth/teeth, toileting, grooming, dressing, etc )  - Assess/evaluate cause of self-care deficits   - Assess range of motion  - Assess patient's mobility; develop plan if impaired  - Assess patient's need for assistive devices and provide as appropriate  - Encourage maximum independence but intervene and supervise when necessary  - Involve family in performance of ADLs  - Assess for home care needs following discharge   - Consider OT consult to assist with ADL evaluation and planning for discharge  - Provide patient education as appropriate  Outcome: Progressing  Goal: Maintains/Returns to pre admission functional level  Description: INTERVENTIONS:  - Perform BMAT or MOVE assessment daily    - Set and communicate daily mobility goal to care team and patient/family/caregiver  - Collaborate with rehabilitation services on mobility goals if consulted  - Perform Range of Motion 2 times a day  - Reposition patient every 2 hours    - Dangle patient 2 times a day  - Stand patient 2 times a day  - Ambulate patient 3 times a day  - Out of bed to chair 3 times a day   - Out of bed for meals 3 times a day  - Out of bed for toileting  - Record patient progress and toleration of activity level   Outcome: Progressing

## 2022-08-18 NOTE — CASE MANAGEMENT
Case Management Progress Note    Patient name Reuben Alexis  Location S /S -77 MRN [id number]  : 1931 Date 2022       LOS (days): 4  Geometric Mean LOS (GMLOS) (days): 2 90  Days to GMLOS:-0 9        OBJECTIVE:        Current admission status: Inpatient  Preferred Pharmacy:   [facility] [address] [place] [address]  Phone: [phone] Fax: [phone]    [id number] [place] [address] [facility]  [address]  [facility] [place] [address]  Phone: [phone] Fax: [phone]    Primary Care Provider: Carolyn Garcia MD    Primary Insurance: [employer] REP  Secondary Insurance:     PROGRESS NOTE:    CM reviewed [address] referral - referral made to [facility] with no response, excpanded SNF search, left message for daughter requesting call back to discuss  Patient will need auth once facility is chosen

## 2022-08-18 NOTE — CASE MANAGEMENT
Case Management Progress Note    Patient name Luis Perdomo  Location S /S -06 MRN [id number]  : 1931 Date 2022       LOS (days): 4  Geometric Mean LOS (GMLOS) (days): 2 90  Days to GMLOS:-1        OBJECTIVE:        Current admission status: Inpatient  Preferred Pharmacy:   [place] [id number]  Phone: [phone] Fax: [phone]    [id number] [place], [address] [facility]  [address]  [facility] [address]  Phone: [phone] Fax: [phone]    Primary Care Provider: Hardeep Burgos MD    Primary Insurance: [facility]  Secondary Insurance:     PROGRESS NOTE:        Received call back from patient's daughter and provided updated regarding bed search efforts  Verbalizes agreement and understanding of plan

## 2022-08-18 NOTE — ASSESSMENT & PLAN NOTE
· POA with low BP and "blue fingers"  Pt unable to provide any additional history  Unable to contact facility  · Pt denies LUTS but unreliable    · Stable for discharge   · Complete course of Vantin    · Unfortunately unable to return to [facility] due to their ability to care for patient  · Rehab placement pending

## 2022-08-18 NOTE — PLAN OF CARE
Problem: Potential for Falls  Goal: Patient will remain free of falls  Description: INTERVENTIONS:  - Educate patient/family on patient safety including physical limitations  - Instruct patient to call for assistance with activity   - Consult OT/PT to assist with strengthening/mobility   - Keep Call bell within reach  - Keep bed low and locked with side rails adjusted as appropriate  - Keep care items and personal belongings within reach  - Initiate and maintain comfort rounds  - Make Fall Risk Sign visible to staff  - Offer Toileting every  Hours, in advance of need  - Initiate/Maintain alarm  - Obtain necessary fall risk management equipment:   - Apply yellow socks and bracelet for high fall risk patients  - Consider moving patient to room near nurses station  Outcome: Progressing     Problem: Prexisting or High Potential for Compromised Skin Integrity  Goal: Skin integrity is maintained or improved  Description: INTERVENTIONS:  - Identify patients at risk for skin breakdown  - Assess and monitor skin integrity  - Assess and monitor nutrition and hydration status  - Monitor labs   - Assess for incontinence   - Turn and reposition patient  - Assist with mobility/ambulation  - Relieve pressure over bony prominences  - Avoid friction and shearing  - Provide appropriate hygiene as needed including keeping skin clean and dry  - Evaluate need for skin moisturizer/barrier cream  - Collaborate with interdisciplinary team   - Patient/family teaching  - Consider wound care consult   Outcome: Progressing     Problem: MOBILITY - ADULT  Goal: Maintain or return to baseline ADL function  Description: INTERVENTIONS:  -  Assess patient's ability to carry out ADLs; assess patient's baseline for ADL function and identify physical deficits which impact ability to perform ADLs (bathing, care of mouth/teeth, toileting, grooming, dressing, etc )  - Assess/evaluate cause of self-care deficits   - Assess range of motion  - Assess patient's mobility; develop plan if impaired  - Assess patient's need for assistive devices and provide as appropriate  - Encourage maximum independence but intervene and supervise when necessary  - Involve family in performance of ADLs  - Assess for home care needs following discharge   - Consider OT consult to assist with ADL evaluation and planning for discharge  - Provide patient education as appropriate  Outcome: Progressing  Goal: Maintains/Returns to pre admission functional level  Description: INTERVENTIONS:  - Perform BMAT or MOVE assessment daily    - Set and communicate daily mobility goal to care team and patient/family/caregiver  - Collaborate with rehabilitation services on mobility goals if consulted  - Perform Range of Motion  times a day  - Reposition patient every  hours    - Dangle patient  times a day  - Stand patient  times a day  - Ambulate patient times a day  - Out of bed to chair  times a day   - Out of bed for meals  times a day  - Out of bed for toileting  - Record patient progress and toleration of activity level   Outcome: Progressing

## 2022-08-18 NOTE — CASE MANAGEMENT
Case Management Discharge Planning Note    Patient name Cassidy Borden  Location S /S -55 MRN [id number]  : 1931 Date 2022       Current Admission Date: 2022  Current Admission Diagnosis:UTI (urinary tract infection)   Patient Active Problem List    Diagnosis Date Noted    Buttock wound 2022    Positive blood cultures 2022    UTI (urinary tract infection) 2022    Primary hypertension 10/24/2021    PVD (peripheral vascular disease) ([facility] ) 10/24/2021    Late onset Alzheimer's disease with behavioral disturbance ([facility] ) 2021    Benign prostatic hyperplasia with post-void dribbling 2021    Rheumatoid arthritis with positive rheumatoid factor ([facility] [address] ) 2021    Agitation due to dementia ([facility] ) 2021      LOS (days): 4  Geometric Mean LOS (GMLOS) (days): 2 90  Days to GMLOS:-1 1     OBJECTIVE:  Risk of Unplanned Readmission Score: 21 47         Current admission status: Inpatient   Preferred Pharmacy:   [facility] [address] [place] [address]  Phone: [phone] Fax: [phone]    [facility]/pharmacy #[id number]- [address] [address]  Phone: [phone] Fax: [phone]    Primary Care Provider: Shamir Franklin MD    Primary Insurance: [facility]  Secondary Insurance:     DISCHARGE DETAILS:    Discharge planning discussed with[de-identified] Patient's daughter  Freedom of Choice: Yes  Comments - Freedom of Choice: Choice is for [facility] for placement  CM contacted family/caregiver?: Yes  Were Treatment Team discharge recommendations reviewed with patient/caregiver?: Yes  Did patient/caregiver verbalize understanding of patient care needs?: N/A- going to facility  Were patient/caregiver advised of the risks associated with not following Treatment Team discharge recommendations?: Yes    Contacts  Patient Contacts: Joss Yung  Relationship to Patient[de-identified] Family  Contact Method: Phone  Phone Number: [phone]  Reason/Outcome: Continuity of Care, Emergency Contact, Discharge [address]         Is the patient interested in [address] at discharge?: No    DME Referral Provided  Referral made for DME?: No    Other Referral/Resources/Interventions Provided:  Interventions: Short Term Rehab  Referral Comments: Auth tasked to [address] for auth to admit to [facility]      Would you like to participate in our [address] service program?  : No - Declined    Treatment Team Recommendation: Short Term Rehab  Discharge Destination Plan[de-identified] Short Term Rehab ([facility]enee Tate)

## 2022-08-19 ENCOUNTER — APPOINTMENT (OUTPATIENT)
Dept: RADIOLOGY | Facility: HOSPITAL | Age: 87
DRG: 690 | End: 2022-08-19
Payer: COMMERCIAL

## 2022-08-19 LAB
FLUAV RNA RESP QL NAA+PROBE: NEGATIVE
FLUBV RNA RESP QL NAA+PROBE: NEGATIVE
MRSA NOSE QL CULT: ABNORMAL
MRSA NOSE QL CULT: ABNORMAL
RSV RNA RESP QL NAA+PROBE: NEGATIVE
SARS-COV-2 RNA RESP QL NAA+PROBE: NEGATIVE

## 2022-08-19 PROCEDURE — 97116 GAIT TRAINING THERAPY: CPT

## 2022-08-19 PROCEDURE — 97110 THERAPEUTIC EXERCISES: CPT

## 2022-08-19 PROCEDURE — 0241U HB NFCT DS VIR RESP RNA 4 TRGT: CPT | Performed by: PHYSICIAN ASSISTANT

## 2022-08-19 PROCEDURE — RECHECK: Performed by: PHYSICIAN ASSISTANT

## 2022-08-19 PROCEDURE — 99232 SBSQ HOSP IP/OBS MODERATE 35: CPT | Performed by: PHYSICIAN ASSISTANT

## 2022-08-19 PROCEDURE — 73560 X-RAY EXAM OF KNEE 1 OR 2: CPT

## 2022-08-19 RX ADMIN — QUETIAPINE FUMARATE 100 MG: 100 TABLET ORAL at 21:28

## 2022-08-19 RX ADMIN — MICONAZOLE NITRATE: 20 CREAM TOPICAL at 08:01

## 2022-08-19 RX ADMIN — CEFPODOXIME PROXETIL 400 MG: 200 TABLET, FILM COATED ORAL at 08:02

## 2022-08-19 RX ADMIN — ACETAMINOPHEN 650 MG: 325 TABLET ORAL at 09:45

## 2022-08-19 RX ADMIN — CITALOPRAM HYDROBROMIDE 20 MG: 20 TABLET ORAL at 08:00

## 2022-08-19 RX ADMIN — CEFPODOXIME PROXETIL 400 MG: 200 TABLET, FILM COATED ORAL at 18:17

## 2022-08-19 RX ADMIN — ENOXAPARIN SODIUM 30 MG: 30 INJECTION SUBCUTANEOUS at 07:59

## 2022-08-19 RX ADMIN — PREDNISONE 10 MG: 10 TABLET ORAL at 08:00

## 2022-08-19 RX ADMIN — MICONAZOLE NITRATE: 20 CREAM TOPICAL at 17:43

## 2022-08-19 RX ADMIN — DONEPEZIL HYDROCHLORIDE 10 MG: 5 TABLET ORAL at 21:28

## 2022-08-19 RX ADMIN — CLOPIDOGREL BISULFATE 75 MG: 75 TABLET ORAL at 08:00

## 2022-08-19 RX ADMIN — GABAPENTIN 100 MG: 100 CAPSULE ORAL at 08:00

## 2022-08-19 RX ADMIN — METOPROLOL TARTRATE 50 MG: 50 TABLET, FILM COATED ORAL at 08:01

## 2022-08-19 RX ADMIN — DICLOFENAC SODIUM TOPICAL GEL, 1%, 2 G: 10 GEL TOPICAL at 11:50

## 2022-08-19 RX ADMIN — GABAPENTIN 100 MG: 100 CAPSULE ORAL at 17:43

## 2022-08-19 NOTE — PHYSICAL THERAPY NOTE
PHYSICAL THERAPY TREATMENT NOTE  NAME:  Ирина Gudino  DATE: 08/19/22    Length Of Stay: 5  Performed at least 2 patient identifiers during session: Name and ID bracelet    TREATMENT:    08/19/22 0942   PT Last Visit   PT Visit Date 08/19/22   Note Type   Note Type Treatment for insurance authorization   Pain Assessment   Pain Assessment Tool FLACC   Pain Score No Pain  (@ rest,but R knee pain w/ activity, especially weight-bearing knee extension> flexion)   Effect of Pain on Daily Activities Limits speed and independence of mobility, limits a/P ROM right knee   Patient's Stated Pain Goal No pain   Hospital Pain Intervention(s) Cold applied; Ambulation/increased activity; Emotional support  (Notified slim, spoke to RN regarding pain medication request by patient)   Pain Rating: FLACC (Activity) - Face 2   Pain Rating: FLACC (Activity) - Legs 1   Pain Rating: FLACC (Activity) - Activity 1   Pain Rating: FLACC (Activity) - Cry 1   Pain Rating: FLACC (Activity) - Consolability 1   Score: FLACC (Activity) 6   Restrictions/Precautions   Other Precautions Bed Alarm; Chair Alarm;Cognitive; Impulsive; Fall Risk;Pain  (Agitated times, not during session; no isolation)   General   Chart Reviewed Yes   Response to Previous Treatment Patient unable to report, no changes reported from family or staff   Family/Caregiver Present No   Cognition   Overall Cognitive Status Impaired   Arousal/Participation   (Initially lethargic, later alert)   Attention Attends with cues to redirect   Orientation Level Oriented to person   Memory Decreased recall of precautions;Decreased recall of recent events;Decreased short term memory   Following Commands Follows one step commands with increased time or repetition   Subjective   Subjective Patient needs encouragement to participate  Unaware that he was incontinent of bowel and bladder upon entering room   Bed Mobility   Supine to Sit 4  Minimal assistance   Additional items Assist x 1; Increased time required;Verbal cues; Bedrails;HOB elevated  (Taking more than 1 minutes to complete)   Transfers   Sit to Stand 4  Minimal assistance   Additional items Assist x 1; Increased time required;Verbal cues; Bedrails   Stand to Sit 4  Minimal assistance   Additional items Assist x 1; Increased time required;Verbal cues  (Instruction for completion of transfer, and to  slowing deceleration)   Ambulation/Elevation   Gait pattern Antalgic;R Knee Ananda;Decreased R stance   Gait Assistance 4  Minimal assist   Additional items Assist x 2  (Assist for walker management and steadying support)   Assistive Device Rolling walker   Distance 2'+4'limited by incontinence in fatigue   Stair Management Assistance Not tested   Balance   Static Sitting Good   Static Standing Fair -   Ambulatory Poor +   Endurance Deficit   Endurance Deficit Yes   Endurance Deficit Description Limited standing tolerance,  ambulation distance, needs therapeutic rest between mobility trials  Patient initially lethargic   Activity Tolerance   Activity Tolerance Patient limited by fatigue;Patient limited by pain   Medical Staff [place] coordination with Iggy julian [facility] regarding patient's right knee pain   Nurse Made Aware Spoke to [facility] regarding pain medication request but patient and care coordination with Joann PCA   Exercises   Hip Flexion Sitting;5 reps;Right;AROM  (No knee pain)   Knee AROM Long Arc Quad Sitting;10 reps;AAROM;AROM; Right;Left  (No pain with LLE, right anterior knee pain with extension during rle)   Neuro re-ed Standing tolerance with BUE support of walker x2 minutes with intermittent steadying assist, needs encouragement to continue for entire trial   Sitting posture Education while at commode during sitting tolerance over 15 minute as patient prefers to be in slouched posture putting him at risk for falls   Assessment   Prognosis Fair   Problem List Decreased strength;Decreased range of motion;Decreased endurance; Impaired balance;Decreased mobility; Decreased coordination;Pain;Decreased safety awareness; Impaired judgement;Decreased cognition;Decreased skin integrity  (Gait deviation)   Assessment Although patient mobilized much better on 2nd treatment session on Wednesday, he required increased assistance for performing transfers and gait during today's session  He continues to ambulate with a rolling walker, but needed assistance today for both walker management and steadying support  He would continue to benefit from verbal instruction for technique during transfers and gait  Spoke to slim and nursing regarding patient's right knee pain for potential further workup  Skilled PT recommended to progress patient towards treatment goals including more independent mobilization within the room to decrease burden of care and to promote return to his facility   Barriers to Discharge Decreased caregiver support; Inaccessible home environment   Goals   Patient Goals None stated, but agreeable for out of bed with encouragement   STG Expiration Date 08/26/22   PT Treatment Day 2   Plan   Treatment/Interventions Functional transfer training;LE strengthening/ROM; Therapeutic exercise; Endurance training;Cognitive reorientation; Bed mobility;Gait training;Patient/family training;Equipment eval/education;Spoke to nursing;Spoke to advanced practitioner   Progress Slow progress, decreased activity tolerance   PT Frequency 2-3x/wk   Recommendation   PT Discharge Recommendation Post acute rehabilitation services   Equipment Recommended Walker; Wheelchair   AM-PAC Basic Mobility Inpatient   Turning in Bed Without Bedrails 3   Lying on Back to Sitting on Edge of Flat Bed 3   Moving Bed to Chair 2   Standing Up From Chair 3   Walk in Room 2   Climb 3-5 Stairs 1   Basic Mobility Inpatient Raw Score 14   Basic Mobility Standardized Score 35 55   Highest Level Of Mobility   -[facility] Goal 4: Move to chair/commode   -HL Achieved 5: Stand (1 or more minutes)   Education   Education Provided Mobility training;Assistive device;Home exercise program   Patient Reinforcement needed; Explanation/teachback used   End of Consult   Patient Position at End of Consult Bedside chair; All needs within reach;Bed/Chair alarm activated     The patient's [facility] Basic Mobility Inpatient Short Form Raw Score is 14, Standardized Score is 35 55  A Raw Score of less than 16 suggests the patient may benefit from discharge to post-acute rehabilitation services, which DOES coincide with CURRENT above PT recommendations  However please refer to therapist recommendation for discharge planning given other factors that may influence destination  Adapted from [facility] [employer] [facility] 6-Clicks Basic Mobility and Daily Activity Scores With Discharge Destination  Physical Therapy, 2021;101:1-9   DOI: 10 [email or web address]    Ines Taylor, PT, DPT

## 2022-08-19 NOTE — PROGRESS NOTES
[facility]  Progress Note - Nohemy Michel 9/7/1931, 80 y o  male MRN: [id number]  Unit/Bed#: S -01 Encounter: [id number]  Primary Care Provider: Emily Kenyon MD   Date and time admitted to hospital: 8/13/2022 11:50 AM    * UTI (urinary tract infection)  Assessment & Plan  · POA with low BP and "blue fingers"  Pt unable to provide any additional history  Unable to contact facility  · Pt denies LUTS but unreliable  · Stable for discharge   · Completed course of Vantin    · Unfortunately unable to return to [facility] due to their ability to care for patient  · Rehab placement pending    Positive blood cultures  Assessment & Plan  · Likely contaminant  · 1/2 admission blood cultures positive for GPC, staph coagulase neg  · Repeat cultures negative   · No further work up or treatment needed    Late onset Alzheimer's disease with behavioral disturbance (HCC)  Assessment & Plan  · Stable   · Continue citalopram 20 mg daily, quetiapine 100 mg daily, donepezil 10 mg daily    Rheumatoid arthritis with positive rheumatoid factor (HCC)  Assessment & Plan  · Continue prednisone 10 mg daily    Primary hypertension  Assessment & Plan  · BPs initially soft, but now improved  · Resumed Metoprolol tartrate 50 mg BID    Buttock wound  Assessment & Plan  · All wound present on admission  · Patient with scattered wounds of right hip buttock and posterior thigh  · Wound care consulted  · Appreciate their recommendations, wound care instructions copied into discharge instructions section    Hypernatremia-resolved as of 8/17/2022  Assessment & Plan  · Resolved           VTE Pharmacologic Prophylaxis: VTE Score: 5 lovenox    Patient Centered Rounds: I performed bedside rounds with nursing staff today  Discussions with Specialists or Other Care Team Provider: case mgmt    Education and Discussions with Family / Patient: updated daughter over phone     Time Spent for Care: 20 minutes   More than 50% of total time spent on counseling and coordination of care as described above  Current Length of Stay: 5 day(s)  Current Patient Status: Inpatient   Certification Statement: The patient will continue to require additional inpatient hospital stay due to awaiting dc  Discharge Plan: dc today ? when insurance auth obtained    Code Status: Level 3 - DNAR and DNI    Subjective:   Patient offers no complaints or concerns    Objective:     Vitals:   Temp (24hrs), Av 5 °F (36 9 °C), Min:98 5 °F (36 9 °C), Max:98 5 °F (36 9 °C)    Temp:  [98 5 °F (36 9 °C)] 98 5 °F (36 9 °C)  HR:  [46-60] 60  Resp:  [16] 16  BP: (115-138)/(55-76) 138/76  SpO2:  [93 %-96 %] 96 %  There is no height or weight on file to calculate BMI  Input and Output Summary (last 24 hours): Intake/Output Summary (Last 24 hours) at 2022 0928  Last data filed at 2022 1100  Gross per 24 hour   Intake 120 ml   Output --   Net 120 ml       Physical Exam:   Physical Exam  Vitals reviewed  Constitutional:       General: He is not in acute distress  Appearance: Normal appearance  He is obese  He is not ill-appearing, toxic-appearing or diaphoretic  Eyes:      General: No scleral icterus  Right eye: No discharge  Left eye: No discharge  Conjunctiva/sclera: Conjunctivae normal    Cardiovascular:      Rate and Rhythm: Normal rate and regular rhythm  Heart sounds: No murmur heard  Pulmonary:      Effort: No respiratory distress  Breath sounds: No stridor  No wheezing, rhonchi or rales  Abdominal:      General: There is no distension  Tenderness: There is no guarding  Musculoskeletal:      Right lower leg: No edema  Left lower leg: No edema  Skin:     General: Skin is warm and dry  Coloration: Skin is not jaundiced or pale  Findings: No bruising, erythema, lesion or rash  Neurological:      Mental Status: He is alert     Psychiatric:         Mood and Affect: Mood normal  Additional Data:     Labs:  Results from last 7 days   Lab Units 08/16/22  0516   WBC Thousand/uL 4 31   HEMOGLOBIN g/dL 10 9*   HEMATOCRIT % 34 3*   PLATELETS Thousands/uL 186   NEUTROS PCT % 39*   LYMPHS PCT % 41   MONOS PCT % 10   EOS PCT % 7*     Results from last 7 days   Lab Units 08/16/22  0516   SODIUM mmol/L 139   POTASSIUM mmol/L 4 4   CHLORIDE mmol/L 110*   CO2 mmol/L 24   BUN mg/dL 28*   CREATININE mg/dL 1 56*   ANION GAP mmol/L 5   CALCIUM mg/dL 8 0*   ALBUMIN g/dL 3 0*   TOTAL BILIRUBIN mg/dL 0 41   ALK PHOS U/L 51   ALT U/L 5*   AST U/L 12*   GLUCOSE RANDOM mg/dL 81     Results from last 7 days   Lab Units 08/13/22  1318   INR  0 96             Results from last 7 days   Lab Units 08/13/22  1318   LACTIC ACID mmol/L 1 8       Lines/Drains:  Invasive Devices  Report    Peripheral Intravenous Line  Duration           Peripheral IV 08/17/22 Right;Ventral (anterior) Forearm 1 day              Imaging:     Recent Cultures (last 7 days):   Results from last 7 days   Lab Units 08/15/22  0350 08/14/22  1900 08/13/22  1732 08/13/22  1353 08/13/22  1318   BLOOD CULTURE  No Growth After 4 Days  No Growth After 4 Days  No Growth After 4 Days  --  No Growth After 5 Days   Staphylococcus coagulase negative*   GRAM STAIN RESULT   --   --   --   --  Gram positive cocci in clusters*   URINE CULTURE   --   --  >100,000 cfu/ml Escherichia coli*  --   --        Last 24 Hours Medication List:   Current Facility-Administered Medications   Medication Dose Route Frequency Provider Last Rate    acetaminophen  650 mg Oral Q6H PRN Julian Freeman MD      cefpodoxime  400 mg Oral BID With Meals Andre [facility]      citalopram  20 mg Oral Daily Julian ShowMD mike      clopidogrel  75 mg Oral Daily Julian ShowMD mike      donepezil  10 mg Oral HS Julian Freeman MD      enoxaparin  30 mg Subcutaneous Daily Julian Freeman MD      gabapentin  100 mg Oral BID Julian ShowMD mike      metoprolol tartrate  50 mg Oral Daily [facility] MD Tonia Pearce ANTIFUNGAL   Topical BID Lennox Eloise, MD      ondansetron  4 mg Intravenous Q6H PRN Danielle Rebolledo MD      predniSONE  10 mg Oral Daily Danielle Rebolledo MD      QUEtiapine  100 mg Oral HS Danielle Rebolledo MD          Today, Patient Was Seen By: Claudia Luis PA-C    **Please Note: This note may have been constructed using a voice recognition system  **

## 2022-08-19 NOTE — ASSESSMENT & PLAN NOTE
· POA with low BP and "blue fingers"  Pt unable to provide any additional history  Unable to contact facility  · Pt denies LUTS but unreliable    · Complete course of Vantin    · Unfortunately unable to return to [facility] due to their ability to care for patient  · Rehab placement

## 2022-08-19 NOTE — CASE MANAGEMENT
Case Management Discharge Planning Note    Patient name [place] Delay  Location S /S -81 MRN [id number]  : 1931 Date 2022       Current Admission Date: 2022  Current Admission Diagnosis:UTI (urinary tract infection)   Patient Active Problem List    Diagnosis Date Noted    Buttock wound 2022    Positive blood cultures 2022    UTI (urinary tract infection) 2022    Primary hypertension 10/24/2021    PVD (peripheral vascular disease) ([facility] [address] [facility] ) 10/24/2021    [facility] 2021    Benign prostatic hyperplasia with post-void dribbling 2021    Rheumatoid arthritis with positive rheumatoid factor [facility] 2021    Agitation due to dementia ([facility] ) 2021      LOS (days): 5  Geometric Mean LOS (GMLOS) (days): 2 90  Days to GMLOS:-2 1     OBJECTIVE:  Risk of Unplanned Readmission Score: 14 8         Current admission status: Inpatient   Preferred Pharmacy:   [address], [address] [place] [id number]  Phone: [phone] Fax: [phone]    [facility]/pharmacy #[id number] [address]  Phone: [phone] Fax: [phone]    Primary Care Provider: Alphonse Kay MD    Primary Insurance: [employer]  Secondary Insurance:     DISCHARGE DETAILS:        CM received auth for pt to go to skilled nursing at [facility]  Transportation is needed and referral was made to [facility] via [address] requesting a [address] transport  CM received call from [facility] admissions who stated they cannot have pt come to the facility tonight  They have 4 admissions already and that is their max per day  [facility] is able to accept pt tomorrow  Transport has been canceled  Request for transport has been made for 1000 tomorrow

## 2022-08-19 NOTE — ASSESSMENT & PLAN NOTE
· POA with low BP and "blue fingers"  Pt unable to provide any additional history  Unable to contact facility  · Pt denies LUTS but unreliable    · Stable for discharge   · Complete course of Vantin    · Unfortunately unable to return to [facility] due to their ability to care for patient  · Rehab placement pending

## 2022-08-19 NOTE — PLAN OF CARE
Problem: PHYSICAL THERAPY ADULT  Goal: Performs mobility at highest level of function for planned discharge setting  See evaluation for individualized goals  Description: Treatment/Interventions: Functional transfer training, LE strengthening/ROM, Therapeutic exercise, Endurance training, Cognitive reorientation, Equipment eval/education, Patient/family training, Gait training, Bed mobility, Spoke to nursing  Equipment Recommended: Danita Gupta, Wheelchair       See flowsheet documentation for full assessment, interventions and recommendations  Outcome: Not Progressing  Note: Prognosis: Fair  Problem List: Decreased strength, Decreased range of motion, Decreased endurance, Impaired balance, Decreased mobility, Decreased coordination, Pain, Decreased safety awareness, Impaired judgement, Decreased cognition, Decreased skin integrity (Gait deviation)  Assessment: Although patient mobilized much better on 2nd treatment session on Wednesday, he required increased assistance for performing transfers and gait during today's session  He continues to ambulate with a rolling walker, but needed assistance today for both walker management and steadying support  He would continue to benefit from verbal instruction for technique during transfers and gait  Spoke to slim and nursing regarding patient's right knee pain for potential further workup  Skilled PT recommended to progress patient towards treatment goals including more independent mobilization within the room to decrease burden of care and to promote return to his facility  Barriers to Discharge: Decreased caregiver support, Inaccessible home environment     PT Discharge Recommendation: Post acute rehabilitation services    See flowsheet documentation for full assessment

## 2022-08-19 NOTE — PLAN OF CARE
Problem: Potential for Falls  Goal: Patient will remain free of falls  Description: INTERVENTIONS:  - Educate patient/family on patient safety including physical limitations  - Instruct patient to call for assistance with activity   - Consult OT/PT to assist with strengthening/mobility   - Keep Call bell within reach  - Keep bed low and locked with side rails adjusted as appropriate  - Keep care items and personal belongings within reach  - Initiate and maintain comfort rounds  - Make Fall Risk Sign visible to staff  - Offer Toileting every 2 Hours, in advance of need  - Initiate/Maintain alarm  - Obtain necessary fall risk management equipment  - Apply yellow socks and bracelet for high fall risk patients  - Consider moving patient to room near nurses station  Outcome: Progressing     Problem: Prexisting or High Potential for Compromised Skin Integrity  Goal: Skin integrity is maintained or improved  Description: INTERVENTIONS:  - Identify patients at risk for skin breakdown  - Assess and monitor skin integrity  - Assess and monitor nutrition and hydration status  - Monitor labs   - Assess for incontinence   - Turn and reposition patient  - Assist with mobility/ambulation  - Relieve pressure over bony prominences  - Avoid friction and shearing  - Provide appropriate hygiene as needed including keeping skin clean and dry  - Evaluate need for skin moisturizer/barrier cream  - Collaborate with interdisciplinary team   - Patient/family teaching  - Consider wound care consult   Outcome: Progressing     Problem: MOBILITY - ADULT  Goal: Maintain or return to baseline ADL function  Description: INTERVENTIONS:  -  Assess patient's ability to carry out ADLs; assess patient's baseline for ADL function and identify physical deficits which impact ability to perform ADLs (bathing, care of mouth/teeth, toileting, grooming, dressing, etc )  - Assess/evaluate cause of self-care deficits   - Assess range of motion  - Assess patient's mobility; develop plan if impaired  - Assess patient's need for assistive devices and provide as appropriate  - Encourage maximum independence but intervene and supervise when necessary  - Involve family in performance of ADLs  - Assess for home care needs following discharge   - Consider OT consult to assist with ADL evaluation and planning for discharge  - Provide patient education as appropriate  Outcome: Progressing  Goal: Maintains/Returns to pre admission functional level  Description: INTERVENTIONS:  - Perform BMAT or MOVE assessment daily    - Set and communicate daily mobility goal to care team and patient/family/caregiver  - Collaborate with rehabilitation services on mobility goals if consulted  - Perform Range of Motion 3 times a day  - Reposition patient every 2 hours    - Dangle patient 3 times a day  - Stand patient 3 times a day  - Ambulate patient 3 times a day  - Out of bed to chair 3 times a day   - Out of bed for meals 3 times a day  - Out of bed for toileting  - Record patient progress and toleration of activity level   Outcome: Progressing

## 2022-08-19 NOTE — DISCHARGE SUMMARY
[facility]  Discharge- Reuben Plano 9/7/1931, 80 y o  male MRN: [id number]  Unit/Bed#: S -01 Encounter: [id number]  Primary Care Provider: Carolyn Garcia MD   Date and time admitted to hospital: 8/13/2022 11:50 AM    * UTI (urinary tract infection)  Assessment & Plan  · POA with low BP and "blue fingers"  Pt unable to provide any additional history  Unable to contact facility  · Pt denies LUTS but unreliable  · Complete course of Vantin    · Unfortunately unable to return to [facility] due to their ability to care for patient  · Rehab placement     Positive blood cultures  Assessment & Plan  · Likely contaminant  · 1/2 admission blood cultures positive for GPC, staph coagulase neg  · Repeat cultures negative   · No further work up or treatment needed    Late onset Alzheimer's disease with behavioral disturbance ([facility] )  Assessment & Plan  · Stable   · Continue citalopram 20 mg daily, quetiapine 100 mg daily, donepezil 10 mg daily    Rheumatoid arthritis with positive rheumatoid factor (HCC)  Assessment & Plan  · Continue prednisone 10 mg daily    Primary hypertension  Assessment & Plan  · BPs initially soft, but now improved  · Resumed Metoprolol tartrate 50 mg BID    Buttock wound  Assessment & Plan  · All wound present on admission     · Patient with scattered wounds of right hip buttock and posterior thigh  · Wound care consulted  · Appreciate their recommendations, wound care instructions copied into discharge instructions section    Hypernatremia-resolved as of 8/17/2022  Assessment & Plan  · Resolved        Medical Problems             Resolved Problems  Date Reviewed: 8/19/2022          Resolved    Hypernatremia 8/17/2022     Resolved by  Neha Mariano PA-C              Discharging Physician / Practitioner: Neha Mariano PA-C  PCP: Carolyn Garcia MD  Admission Date:   Admission Orders (From admission, onward)     Ordered        08/14/22 1537  Inpatient Admission  Once 08/13/22 1819  Place in Observation  Once                      Discharge Date: 08/19/22    Consultations During Hospital Stay:  · Wound care    Procedures Performed:   · Knee x-ray  · Chest x-ray    Significant Findings / Test Results:   · As above    Incidental Findings:   · None     Test Results Pending at Discharge (will require follow up): · None     Outpatient Tests Requested:  · None    Complications:  None    Reason for Admission:  Bluish discoloration of his hands and mildly low blood pressure    Hospital Course:   Vinod Rosen is a 80 y o  male patient with underlying history of dementia and hypertension who originally presented to the hospital on 8/13/2022 due to bluish discoloration of his hands and mildly low blood pressure with suspected source being urinary tract infection  He was treated with appropriate antibiotics and was noted to have clinical improvement  However, he was not felt to be stable to return to his assisted living and required short-term rehab  Please see above list of diagnoses and related plan for additional information  Condition at Discharge:  Stable    Discharge Day Visit / Exam:   * Please refer to separate progress note for these details *    Discussion with Family:     Discharge instructions/Information to patient and family:   See after visit summary for information provided to patient and family  Provisions for Follow-Up Care:  See after visit summary for information related to follow-up care and any pertinent home health orders  Disposition:   Short-term rehab    Planned Readmission:  None     Discharge Statement:  I spent 35 minutes discharging the patient  This time was spent on the day of discharge  I had direct contact with the patient on the day of discharge   Greater than 50% of the total time was spent examining patient, answering all patient questions, arranging and discussing plan of care with patient as well as directly providing post-discharge instructions  Additional time then spent on discharge activities  Was updated by case management in the afternoon regarding d/c  Discharge Medications:  See after visit summary for reconciled discharge medications provided to patient and/or family        **Please Note: This note may have been constructed using a voice recognition system**

## 2022-08-19 NOTE — CASE MANAGEMENT
Case Management Discharge Planning Note    Patient name Saloni Looney  Location S /S -20 MRN [id number]  : 1931 Date 2022       Current Admission Date: 2022  Current Admission Diagnosis:UTI (urinary tract infection)   Patient Active Problem List    Diagnosis Date Noted    Buttock wound 2022    Positive blood cultures 2022    UTI (urinary tract infection) 2022    Primary hypertension 10/24/2021    PVD (peripheral vascular disease) ([facility] [facility] ) 10/24/2021    Late onset Alzheimer's disease with behavioral disturbance ([facility] ) 2021    Benign prostatic hyperplasia with post-void dribbling 2021    [facility] factor [facility] 2021    Ag[facility] due to dementia [facility] ) 2021      LOS (days): 5  Geometric Mean LOS (GMLOS) (days): 2 90  Days to GMLOS:-2     OBJECTIVE:  Risk of Unplanned Readmission Score: 14 6         Current admission status: Inpatient   Preferred Pharmacy:   [address], [address] [place] [address]  Phone: [phone] Fax: [phone]    [facility]/pharmacy #[id number]- [address] [address]  Phone: [phone] Fax: [phone]    Primary Care Provider: Maddy Calderon MD    Primary Insurance: [facility]  Secondary Insurance:     [address] [place] [address] Avenue Number: approved [facility] [address]  #[id number] for [facility]:   NRD:   Care Coord: Sheree Finch  P: [phone]   F: [phone]

## 2022-08-20 VITALS
RESPIRATION RATE: 18 BRPM | TEMPERATURE: 97.8 F | SYSTOLIC BLOOD PRESSURE: 136 MMHG | DIASTOLIC BLOOD PRESSURE: 72 MMHG | OXYGEN SATURATION: 94 % | HEART RATE: 55 BPM

## 2022-08-20 LAB
BACTERIA BLD CULT: NORMAL

## 2022-08-20 PROCEDURE — 99239 HOSP IP/OBS DSCHRG MGMT >30: CPT | Performed by: PHYSICIAN ASSISTANT

## 2022-08-20 RX ADMIN — CEFPODOXIME PROXETIL 400 MG: 200 TABLET, FILM COATED ORAL at 10:58

## 2022-08-20 RX ADMIN — MICONAZOLE NITRATE: 20 CREAM TOPICAL at 09:16

## 2022-08-20 RX ADMIN — CITALOPRAM HYDROBROMIDE 20 MG: 20 TABLET ORAL at 09:15

## 2022-08-20 RX ADMIN — GABAPENTIN 100 MG: 100 CAPSULE ORAL at 09:15

## 2022-08-20 RX ADMIN — PREDNISONE 10 MG: 10 TABLET ORAL at 09:15

## 2022-08-20 RX ADMIN — ENOXAPARIN SODIUM 30 MG: 30 INJECTION SUBCUTANEOUS at 09:15

## 2022-08-20 RX ADMIN — CLOPIDOGREL BISULFATE 75 MG: 75 TABLET ORAL at 09:15

## 2022-08-20 RX ADMIN — METOPROLOL TARTRATE 50 MG: 50 TABLET, FILM COATED ORAL at 09:16

## 2022-08-20 NOTE — CASE MANAGEMENT
Case Management Discharge Planning Note    Patient name Luis Perdomo  Location [address] 429/S -44 MRN [id number]  : 1931 Date 2022       Current Admission Date: 2022  Current Admission Diagnosis:UTI (urinary tract infection)   Patient Active Problem List    Diagnosis Date Noted    Buttock wound 2022    Positive blood cultures 2022    UTI (urinary tract infection) 2022    Primary hypertension 10/24/2021    PVD (peripheral vascular disease) ([facility] ) 10/24/2021    [facility] 2021    Benign prostatic hyperplasia with post-void dribbling 2021    Rheumatoid arthritis with positive rheumatoid factor ([facility] ) 2021    Agitation due to dementia ([facility] ) 2021      LOS (days): 6  Geometric Mean LOS (GMLOS) (days): 2 90  Days to GMLOS:-2 9     OBJECTIVE:  Risk of Unplanned Readmission Score: 14 94         Current admission status: Inpatient   Preferred Pharmacy:   [address] [address]  Phone: [phone] Fax: [phone]    [id number] [address]  Phone: [phone] Fax: [phone]    Primary Care Provider: Hardeep Burgos MD    Primary Insurance: [facility]  Secondary Insurance:     DISCHARGE DETAILS:        Pt will be transported to [facility] at [facility] via [address] with [facility]  DC arrangements have been relayed to daughter, pt, nursing and facility  Facility contact inforamation has been placed in chart  IMM reviewed with daughter, daughter agrees with discharge determination                                                                                                    Accepting Facility Name, [address] : [facility]  Receiving Facility/Agency Phone Number: [phone]  Facility/Agency Fax Number: [phone]

## 2022-08-20 NOTE — DISCHARGE SUMMARY
Please note that SNF wished for patient to be discharged this am instead of yesterday afternoon but patient remained stable  D/C summary from yesterday with minor changes as below    [facility]  Discharge- Nathaniel Columba 9/7/1931, 80 y o  male MRN: [id number]  Unit/Bed#: S -01 Encounter: [id number]  Primary Care Provider: Matt Chris MD   Date and time admitted to hospital: 8/13/2022 11:50 AM     * UTI (urinary tract infection)  Assessment & Plan  · POA with low BP and "blue fingers"  Pt was unable to provide any additional history  · Pt denied LUTS but unreliable  ? Complete course of Vantin    ? Unfortunately unable to return to [facility] due to their ability to care for patient  § Rehab placement      Positive blood cultures  Assessment & Plan  · Likely contaminant  · 1/2 admission blood cultures positive for GPC, staph coagulase neg  · Repeat cultures negative   ? No further work up or treatment needed     Late onset Alzheimer's disease with behavioral disturbance ([facility] [address] )  Assessment & Plan  · Stable -no behavioral issues  ? Continue citalopram 20 mg daily, quetiapine 100 mg daily, donepezil 10 mg daily     Rheumatoid arthritis with positive rheumatoid factor (HCC)  Assessment & Plan  · Continue prednisone 10 mg daily  · Continue tylenol and voltaren for knee pain  Xray unremarkable     Primary hypertension  Assessment & Plan  · BPs initially soft, but now improved  ? Resumed Metoprolol tartrate 50 mg BID     Buttock wound  Assessment & Plan  · All wound present on admission  · Patient with scattered wounds of right hip buttock and posterior thigh  ?  Wound care consulted  § Appreciate their recommendations, wound care instructions copied into discharge instructions section     Hypernatremia-resolved as of 8/17/2022  Assessment & Plan  · Resolved               Medical Problems                            Resolved Problems  Date Reviewed: 8/19/2022                    Resolved   Hypernatremia 8/17/2022        Resolved by  Sherri Mcdonough PA-C                  Discharging Physician / Practitioner: Sherri Mcdonough PA-C  PCP: Duane Bhatt MD  Admission Date:       Admission Orders (From admission, onward)              Ordered          08/14/22 1537   Inpatient Admission  Once              08/13/22 1819   Place in Observation  Once                          Discharge Date: 08/20/22     Consultations During Hospital Stay:  · Wound care     Procedures Performed:   · Knee x-ray  · Chest x-ray     Significant Findings / Test Results:   · As above     Incidental Findings:   · None      Test Results Pending at Discharge (will require follow up): · None     Outpatient Tests Requested:  · None     Complications:  None     Reason for Admission:  Bluish discoloration of his hands and mildly low blood pressure     Hospital Course:   Yen Blair is a 80 y o  male patient with underlying history of dementia and hypertension who originally presented to the hospital on 8/13/2022 due to bluish discoloration of his hands and mildly low blood pressure with suspected source being urinary tract infection  He was treated with appropriate antibiotics and was noted to have clinical improvement  However, he was not felt to be stable to return to his assisted living and required short-term rehab       Please see above list of diagnoses and related plan for additional information       Condition at Discharge:  Stable     Discharge Day Visit / Exam:   Subjective:  Patient offers no new complaints or concerns aside from some right knee pain     Physical Exam  Vitals reviewed  Constitutional:       General: He is not in acute distress  Appearance: Normal appearance  He is obese  He is not ill-appearing, toxic-appearing or diaphoretic  Eyes:      General: No scleral icterus  Right eye: No discharge  Left eye: No discharge        Conjunctiva/sclera: Conjunctivae normal    Cardiovascular: Rate and Rhythm: Normal rate and regular rhythm  Heart sounds: No murmur heard  Pulmonary:      Effort: No respiratory distress  Breath sounds: No stridor  No wheezing, rhonchi or rales  Abdominal:      General: There is no distension  Tenderness: There is no guarding  Musculoskeletal:      Right lower leg: No edema  Left lower leg: No edema  Skin:     General: Skin is warm and dry  Coloration: Skin is not jaundiced or pale  Findings: No erythema, lesion or rash  Some bruising is noted near the right knee  Neurological:      Mental Status: He is alert  Psychiatric:         Mood and Affect: Mood normal      Discussion with Family: spoke with daughter over the phone     Discharge instructions/Information to patient and family:   See after visit summary for information provided to patient and family        Provisions for Follow-Up Care:  See after visit summary for information related to follow-up care and any pertinent home health orders  Disposition:   Short-term rehab     Planned Readmission:  None     Discharge Statement:  I spent 30 minutes discharging the patient  This time was spent on the day of discharge  I had direct contact with the patient on the day of discharge  Greater than 50% of the total time was spent examining patient, answering all patient questions, arranging and discussing plan of care with patient as well as directly providing post-discharge instructions  Additional time then spent on discharge activities     Bedside nursing rounds performed     Discharge Medications:  See after visit summary for reconciled discharge medications provided to patient and/or family        **Please Note: This note may have been constructed using a voice recognition system**

## 2022-08-22 NOTE — UTILIZATION REVIEW
Notification of Discharge   This is a Notification of Discharge from our facility [address]  Please be advised that this patient has been discharge from our facility  Below you will find the admission and discharge date and time including the patients disposition  UTILIZATION REVIEW CONTACT:  Nancy Beck MA  Utilization   Network Utilization Review Department  Phone: [phone] x carefully listen to the prompts  All voicemails are confidential   Email: [email or web address]     PHYSICIAN ADVISORY SERVICES:  FOR [id number] REVIEW - MEDICAL NECESSITY DENIAL  Phone: [phone]  Fax: [phone]  Email: [email or web address]     PRESENTATION DATE: 8/13/2022 11:50 AM  OBERVATION ADMISSION DATE:   INPATIENT ADMISSION DATE: 8/14/22  3:37 PM   DISCHARGE DATE: 8/20/2022 11:11 AM  DISPOSITION: Non [facility] SN[facility]/SNU Non [facility] SNF/TCU/SNU      IMPORTANT INFORMATION:  Send all requests for admission clinical reviews, approved or denied determinations and any other requests to dedicated fax number below belonging to the campus where the patient is receiving treatment   List of dedicated fax numbers:  [address] [place] [address] DENIALS (Administrative/Medical Necessity) [phone]   [address]  (Maternity/NICU/Pediatrics) [phone]   [place] [phone]   [address] [phone]   [address] [place] [address] [phone]   [address] [place] [address] [address] [place] [address],[address] [phone]   [facility]  [phone]   [address] [facility]  [address] [facility] [address] [facility] [phone]

## 2022-08-31 RX ORDER — BACITRACIN 500 [USP'U]/G
OINTMENT TOPICAL AS NEEDED
Qty: 56.7 G | Refills: 0
Start: 2022-08-31 | End: 2022-09-21

## 2022-09-01 NOTE — PROGRESS NOTES
Assessment/Plan:         Problem List Items Addressed This Visit        Other    Agitation due to dementia [facility])      Other Visit Diagnoses     Dermatitis    -  Primary    Relevant Medications    zinc oxide 20 % ointment            Subjective:      Patient ID: Oliva Mclaughlin is a 80 y o  male  This is a 71-year-old male seen examined this is living facility  Patient has been patient has been having some episodes of agitation his dementia his medicines have been reviewed and discussed with staff plans to redirect him to see about decreasing his outbursts  Patient also with irritation on his sacrum  Patient has dermatitis due to want to lay in bed most of the day will start some Z Guard for him and will also see about possibly considering physical therapy he is not getting up and about and moving around the room by his next visit  The following portions of the patient's history were reviewed and updated as appropriate:   Past Medical History:  He has a past medical history of Anxiety, Benign prostate hyperplasia, D[facility] ), Neuropathy, and RA (rheumatoid arthritis) ([facility] )  ,  _______________________________________________________________________  Medical Problems:  does not have any pertinent problems on file ,  _______________________________________________________________________  Past Surgical History:   has no past surgical history on file ,  _______________________________________________________________________  Family History:  family history is not on file ,  _______________________________________________________________________  Social History:   reports that he has never smoked  He has never used smokeless tobacco  He reports previous alcohol use  He reports that he does not use drugs  ,  _______________________________________________________________________  Allergies:  is allergic to doxycycline, levaquin [levofloxacin], and penicillins     _______________________________________________________________________  Current Outpatient Medications   Medication Sig Dispense Refill    zinc oxide 20 % ointment Apply topically as needed for irritation 56 7 g 0    citalopram (CeleXA) 20 mg tablet Take 1 tablet by mouth daily      clopidogrel (PLAVIX) 75 mg tablet Take 1 tablet by mouth daily      Diclofenac Sodium (VOLTAREN) 1 % Apply 2 g topically 4 (four) times a day  0    donepezil (ARICEPT) 10 mg tablet Take 1 tablet (10 mg total) by mouth daily at bedtime 30 tablet 11    gabapentin (NEURONTIN) 100 mg capsule Take 100 mg by mouth 2 (two) times a day      metoprolol tartrate (LOPRESSOR) 50 mg tablet Take 1 tablet by mouth daily      predniSONE 20 mg tablet Take 10 mg by mouth daily      QUEtiapine (SEROquel) 50 mg tablet Take 1 tablet (50 mg total) by mouth daily at bedtime (Patient taking differently: Take 100 mg by mouth daily at bedtime) 30 tablet 11     No current facility-administered medications for this visit      _______________________________________________________________________  Review of Systems   Unable to perform ROS: Dementia         Objective: There were no vitals filed for this visit  There is no height or weight on file to calculate BMI  Physical Exam  Vitals and nursing note reviewed  Constitutional:       Appearance: He is well-developed  He is obese  HENT:      Head: Normocephalic and atraumatic  Nose: Nose normal       Mouth/Throat:      Mouth: Mucous membranes are moist    Eyes:      General: No scleral icterus  Conjunctiva/sclera: Conjunctivae normal       Pupils: Pupils are equal, round, and reactive to light  Neck:      Thyroid: No thyromegaly  Cardiovascular:      Rate and Rhythm: Normal rate and regular rhythm  Heart sounds: Normal heart sounds  Pulmonary:      Effort: Pulmonary effort is normal  No respiratory distress  Breath sounds: Normal breath sounds  No wheezing  Abdominal:      General: Bowel sounds are normal       Palpations: Abdomen is soft  There is no mass  Tenderness: There is no abdominal tenderness  There is no guarding or rebound  Musculoskeletal:         General: Normal range of motion  Cervical back: Normal range of motion and neck supple  Skin:     General: Skin is warm and dry  Findings: Rash present  Neurological:      Mental Status: He is alert  Mental status is at baseline  He is disoriented

## 2022-09-08 ENCOUNTER — HOSPITAL ENCOUNTER (INPATIENT)
Facility: HOSPITAL | Age: 87
LOS: 12 days | Discharge: NURSING, REHAB OR LONG-TERM CARE | DRG: 057 | End: 2022-09-21
Attending: EMERGENCY MEDICINE | Admitting: HOSPITALIST
Payer: COMMERCIAL

## 2022-09-08 DIAGNOSIS — F02.818 LATE ONSET ALZHEIMER'S DISEASE WITH BEHAVIORAL DISTURBANCE (HCC): ICD-10-CM

## 2022-09-08 DIAGNOSIS — F03.90 DEMENTIA (HCC): Primary | ICD-10-CM

## 2022-09-08 DIAGNOSIS — G30.1 LATE ONSET ALZHEIMER'S DISEASE WITH BEHAVIORAL DISTURBANCE (HCC): ICD-10-CM

## 2022-09-08 DIAGNOSIS — I10 PRIMARY HYPERTENSION: ICD-10-CM

## 2022-09-08 DIAGNOSIS — L89.152 PRESSURE INJURY OF SACRAL REGION, STAGE 2 (HCC): ICD-10-CM

## 2022-09-08 DIAGNOSIS — B35.1 FUNGAL NAIL INFECTION: ICD-10-CM

## 2022-09-08 LAB
ALBUMIN SERPL BCP-MCNC: 3.5 G/DL (ref 3.5–5)
ALP SERPL-CCNC: 70 U/L (ref 34–104)
ALT SERPL W P-5'-P-CCNC: 5 U/L (ref 7–52)
ANION GAP SERPL CALCULATED.3IONS-SCNC: 6 MMOL/L (ref 4–13)
AST SERPL W P-5'-P-CCNC: 18 U/L (ref 13–39)
BASOPHILS # BLD AUTO: 0.02 THOUSANDS/ΜL (ref 0–0.1)
BASOPHILS NFR BLD AUTO: 0 % (ref 0–1)
BILIRUB SERPL-MCNC: 0.38 MG/DL (ref 0.2–1)
BUN SERPL-MCNC: 39 MG/DL (ref 5–25)
CALCIUM SERPL-MCNC: 8.5 MG/DL (ref 8.4–10.2)
CHLORIDE SERPL-SCNC: 109 MMOL/L (ref 96–108)
CO2 SERPL-SCNC: 27 MMOL/L (ref 21–32)
CREAT SERPL-MCNC: 1.72 MG/DL (ref 0.6–1.3)
EOSINOPHIL # BLD AUTO: 0.05 THOUSAND/ΜL (ref 0–0.61)
EOSINOPHIL NFR BLD AUTO: 1 % (ref 0–6)
ERYTHROCYTE [DISTWIDTH] IN BLOOD BY AUTOMATED COUNT: 14.1 % (ref 11.6–15.1)
FLUAV RNA RESP QL NAA+PROBE: NEGATIVE
FLUBV RNA RESP QL NAA+PROBE: NEGATIVE
GFR SERPL CREATININE-BSD FRML MDRD: 34 ML/MIN/1.73SQ M
GLUCOSE SERPL-MCNC: 114 MG/DL (ref 65–140)
HCT VFR BLD AUTO: 37.9 % (ref 36.5–49.3)
HGB BLD-MCNC: 12.1 G/DL (ref 12–17)
IMM GRANULOCYTES # BLD AUTO: 0.05 THOUSAND/UL (ref 0–0.2)
IMM GRANULOCYTES NFR BLD AUTO: 1 % (ref 0–2)
LYMPHOCYTES # BLD AUTO: 1.43 THOUSANDS/ΜL (ref 0.6–4.47)
LYMPHOCYTES NFR BLD AUTO: 20 % (ref 14–44)
MCH RBC QN AUTO: 31.6 PG (ref 26.8–34.3)
MCHC RBC AUTO-ENTMCNC: 31.9 G/DL (ref 31.4–37.4)
MCV RBC AUTO: 99 FL (ref 82–98)
MONOCYTES # BLD AUTO: 0.59 THOUSAND/ΜL (ref 0.17–1.22)
MONOCYTES NFR BLD AUTO: 8 % (ref 4–12)
NEUTROPHILS # BLD AUTO: 4.88 THOUSANDS/ΜL (ref 1.85–7.62)
NEUTS SEG NFR BLD AUTO: 70 % (ref 43–75)
NRBC BLD AUTO-RTO: 0 /100 WBCS
PLATELET # BLD AUTO: 177 THOUSANDS/UL (ref 149–390)
PMV BLD AUTO: 10 FL (ref 8.9–12.7)
POTASSIUM SERPL-SCNC: 5 MMOL/L (ref 3.5–5.3)
PROT SERPL-MCNC: 5.9 G/DL (ref 6.4–8.4)
RBC # BLD AUTO: 3.83 MILLION/UL (ref 3.88–5.62)
RSV RNA RESP QL NAA+PROBE: NEGATIVE
SARS-COV-2 RNA RESP QL NAA+PROBE: NEGATIVE
SODIUM SERPL-SCNC: 142 MMOL/L (ref 135–147)
TSH SERPL DL<=0.05 MIU/L-ACNC: 1.34 UIU/ML (ref 0.45–4.5)
WBC # BLD AUTO: 7.02 THOUSAND/UL (ref 4.31–10.16)

## 2022-09-08 PROCEDURE — 84443 ASSAY THYROID STIM HORMONE: CPT | Performed by: EMERGENCY MEDICINE

## 2022-09-08 PROCEDURE — 85025 COMPLETE CBC W/AUTO DIFF WBC: CPT | Performed by: EMERGENCY MEDICINE

## 2022-09-08 PROCEDURE — 36415 COLL VENOUS BLD VENIPUNCTURE: CPT | Performed by: EMERGENCY MEDICINE

## 2022-09-08 PROCEDURE — 99285 EMERGENCY DEPT VISIT HI MDM: CPT | Performed by: EMERGENCY MEDICINE

## 2022-09-08 PROCEDURE — 99285 EMERGENCY DEPT VISIT HI MDM: CPT

## 2022-09-08 PROCEDURE — 96360 HYDRATION IV INFUSION INIT: CPT

## 2022-09-08 PROCEDURE — 93005 ELECTROCARDIOGRAM TRACING: CPT

## 2022-09-08 PROCEDURE — 96361 HYDRATE IV INFUSION ADD-ON: CPT

## 2022-09-08 PROCEDURE — 80053 COMPREHEN METABOLIC PANEL: CPT | Performed by: EMERGENCY MEDICINE

## 2022-09-08 PROCEDURE — 0241U HB NFCT DS VIR RESP RNA 4 TRGT: CPT | Performed by: EMERGENCY MEDICINE

## 2022-09-08 PROCEDURE — 87081 CULTURE SCREEN ONLY: CPT | Performed by: INTERNAL MEDICINE

## 2022-09-08 PROCEDURE — 99220 PR INITIAL OBSERVATION CARE/DAY 70 MINUTES: CPT | Performed by: INTERNAL MEDICINE

## 2022-09-08 RX ORDER — ACETAMINOPHEN 325 MG/1
650 TABLET ORAL EVERY 6 HOURS PRN
Status: DISCONTINUED | OUTPATIENT
Start: 2022-09-08 | End: 2022-09-21 | Stop reason: HOSPADM

## 2022-09-08 RX ORDER — POLYETHYLENE GLYCOL 3350 17 G/17G
17 POWDER, FOR SOLUTION ORAL DAILY PRN
Status: DISCONTINUED | OUTPATIENT
Start: 2022-09-08 | End: 2022-09-21 | Stop reason: HOSPADM

## 2022-09-08 RX ORDER — METOPROLOL TARTRATE 50 MG/1
50 TABLET, FILM COATED ORAL DAILY
Status: DISCONTINUED | OUTPATIENT
Start: 2022-09-09 | End: 2022-09-10

## 2022-09-08 RX ORDER — DONEPEZIL HYDROCHLORIDE 5 MG/1
10 TABLET, FILM COATED ORAL
Status: DISCONTINUED | OUTPATIENT
Start: 2022-09-08 | End: 2022-09-21 | Stop reason: HOSPADM

## 2022-09-08 RX ORDER — CITALOPRAM 20 MG/1
20 TABLET ORAL DAILY
Status: DISCONTINUED | OUTPATIENT
Start: 2022-09-09 | End: 2022-09-21 | Stop reason: HOSPADM

## 2022-09-08 RX ORDER — OLANZAPINE 10 MG/1
5 INJECTION, POWDER, LYOPHILIZED, FOR SOLUTION INTRAMUSCULAR ONCE
Status: COMPLETED | OUTPATIENT
Start: 2022-09-08 | End: 2022-09-08

## 2022-09-08 RX ORDER — CLOPIDOGREL BISULFATE 75 MG/1
75 TABLET ORAL DAILY
Status: DISCONTINUED | OUTPATIENT
Start: 2022-09-09 | End: 2022-09-21 | Stop reason: HOSPADM

## 2022-09-08 RX ORDER — QUETIAPINE FUMARATE 100 MG/1
100 TABLET, FILM COATED ORAL
Status: DISCONTINUED | OUTPATIENT
Start: 2022-09-08 | End: 2022-09-21 | Stop reason: HOSPADM

## 2022-09-08 RX ORDER — OLANZAPINE 10 MG/1
INJECTION, POWDER, LYOPHILIZED, FOR SOLUTION INTRAMUSCULAR
Status: DISPENSED
Start: 2022-09-08 | End: 2022-09-09

## 2022-09-08 RX ORDER — GABAPENTIN 100 MG/1
100 CAPSULE ORAL 2 TIMES DAILY
Status: DISCONTINUED | OUTPATIENT
Start: 2022-09-08 | End: 2022-09-21 | Stop reason: HOSPADM

## 2022-09-08 RX ORDER — ONDANSETRON 2 MG/ML
4 INJECTION INTRAMUSCULAR; INTRAVENOUS EVERY 6 HOURS PRN
Status: DISCONTINUED | OUTPATIENT
Start: 2022-09-08 | End: 2022-09-21 | Stop reason: HOSPADM

## 2022-09-08 RX ORDER — PREDNISONE 10 MG/1
10 TABLET ORAL DAILY
Status: DISCONTINUED | OUTPATIENT
Start: 2022-09-09 | End: 2022-09-21 | Stop reason: HOSPADM

## 2022-09-08 RX ORDER — MAGNESIUM HYDROXIDE/ALUMINUM HYDROXICE/SIMETHICONE 120; 1200; 1200 MG/30ML; MG/30ML; MG/30ML
30 SUSPENSION ORAL EVERY 6 HOURS PRN
Status: DISCONTINUED | OUTPATIENT
Start: 2022-09-08 | End: 2022-09-21 | Stop reason: HOSPADM

## 2022-09-08 RX ORDER — ENOXAPARIN SODIUM 100 MG/ML
40 INJECTION SUBCUTANEOUS DAILY
Status: DISCONTINUED | OUTPATIENT
Start: 2022-09-09 | End: 2022-09-21 | Stop reason: HOSPADM

## 2022-09-08 RX ADMIN — QUETIAPINE FUMARATE 100 MG: 100 TABLET ORAL at 21:54

## 2022-09-08 RX ADMIN — SODIUM CHLORIDE 1000 ML: 0.9 INJECTION, SOLUTION INTRAVENOUS at 16:34

## 2022-09-08 RX ADMIN — DONEPEZIL HYDROCHLORIDE 10 MG: 5 TABLET ORAL at 21:58

## 2022-09-08 RX ADMIN — OLANZAPINE 5 MG: 10 INJECTION, POWDER, FOR SOLUTION INTRAMUSCULAR at 23:42

## 2022-09-08 RX ADMIN — GABAPENTIN 100 MG: 100 CAPSULE ORAL at 21:27

## 2022-09-08 NOTE — ASSESSMENT & PLAN NOTE
· Patient transferred from current facility for questionable dementia with agitated features  · Patient has no idea why he is here and extensor review systems is negative  · Per discussion with ER his current facility is unable to care for him however no one at that facility could answer the phone for conversation   · Will admit for observation overnight and discussed with case management in a m  · Per patient's daughter facility taller he had an abscess in his buttocks which required evaluation  I am only revealing a stage II decubitus as below

## 2022-09-08 NOTE — ED NOTES
RN tried multiple times to call [facility]  Unable to get through to staff  Provider aware        Li Coleman RN  09/08/22 0664

## 2022-09-08 NOTE — H&P
[facility]  H&P- Lele Orr 9/7/1931, 80 y o  male MRN: [id number]  Unit/Bed#: ED-28 Encounter: [id number]  Primary Care Provider: No primary care provider on file  Date and time admitted to hospital: 9/8/2022  3:17 PM    * Late onset Alzheimer's disease with behavioral disturbance ([address] )  Assessment & Plan  · Patient transferred from current facility for questionable dementia with agitated features  · Patient has no idea why he is here and extensor review systems is negative  · Per discussion with ER his current facility is unable to care for him however no one at that facility could answer the phone for conversation   · Will admit for observation overnight and discussed with case management in a m  · Per patient's daughter facility taller he had an abscess in his buttocks which required evaluation  I am only revealing a stage II decubitus as below  Pressure injury of sacral region, stage 2 ([facility] )  Assessment & Plan  · Consult wound care    Primary hypertension  Assessment & Plan  · Continue outpatient medications    Rheumatoid arthritis with positive rheumatoid factor (HCC)  Assessment & Plan  · On chronic steroids will continue    VTE Pharmacologic Prophylaxis: VTE Score: 4 Moderate Risk (Score 3-4) - Pharmacological DVT Prophylaxis Ordered: enoxaparin (Lovenox)  Code Status:  Full code  Discussion with family: Updated  (daughter) via phone  Anticipated Length of Stay: Patient will be admitted on an observation basis with an anticipated length of stay of less than 2 midnights secondary to Sacral decubitus  Total Time for Visit, including Counseling / Coordination of Care: 30 minutes Greater than 50% of this total time spent on direct patient counseling and coordination of care      Chief Complaint:  I have no idea line here    History of Present Illness:  Lele Orr is a 80 y o  male with a PMH of dementia, rheumatoid arthritis and hypertension who presents with question of abscess  Patient is unable to provide further history and extensive review systems negative due to underlying dementia  Patient's daughter reports that the facility called her today and said he had an abscess and was complaining of dysuria and that they could not care for him  ER was under the impression that facility can care for him and they were trying to find alternative placement port able to do so was sent to the hospital for evaluation  Regardless the facility was unreachable after patient was transferred and we are asked to admit    Review of Systems:  Review of Systems   Unable to perform ROS: Dementia       Past Medical and Surgical History:   Past Medical History:   Diagnosis Date    Anxiety     Benign prostate hyperplasia     Dementia ([facility] )     Neuropathy     RA (rheumatoid arthritis) ([facility] )        History reviewed  No pertinent surgical history  Meds/Allergies:  Prior to Admission medications    Medication Sig Start Date End Date Taking?  Authorizing Provider   citalopram (CeleXA) 20 mg tablet Take 1 tablet by mouth daily   Yes Historical Provider, MD   clopidogrel (PLAVIX) 75 mg tablet Take 1 tablet by mouth daily   Yes Historical Provider, MD   metoprolol tartrate (LOPRESSOR) 50 mg tablet Take 1 tablet by mouth daily   Yes Historical Provider, MD   Diclofenac Sodium (VOLTAREN) 1 % Apply 2 g topically 4 (four) times a day 8/19/22   Vicenta Crowder PA-C   donepezil (ARICEPT) 10 mg tablet Take 1 tablet (10 mg total) by mouth daily at bedtime 4/11/21   Brooke Cordova MD   gabapentin (NEURONTIN) 100 mg capsule Take 100 mg by mouth 2 (two) times a day    Historical Provider, MD   predniSONE 20 mg tablet Take 10 mg by mouth daily    Historical Provider, MD   QUEtiapine (SEROquel) 50 mg tablet Take 1 tablet (50 mg total) by mouth daily at bedtime  Patient taking differently: Take 100 mg by mouth daily at bedtime 5/23/21   Brooke Cordova MD   zinc oxide 20 % ointment Apply topically as needed for irritation 8/31/22   Ynes Malone MD     I have reviewed home medications using recent Epic encounter  Allergies: Allergies   Allergen Reactions    Doxycycline GI Intolerance    Levaquin [Levofloxacin] GI Intolerance    Penicillins Edema       Social History:  Marital Status: Single   Occupation:   Patient Pre-hospital Living Situation: Assisted Living  Patient Pre-hospital Level of Mobility: unable to be assessed at time of evaluation  Patient Pre-hospital Diet Restrictions:   Substance Use History:   Social History     Substance and Sexual Activity   Alcohol Use Not Currently     Social History     Tobacco Use   Smoking Status Never Smoker   Smokeless Tobacco Never Used     Social History     Substance and Sexual Activity   Drug Use Never       Family History:  History reviewed  No pertinent family history  Physical Exam:     Vitals:   Blood Pressure: (!) 211/84 (09/08/22 1814)  Pulse: 72 (09/08/22 1814)  Temperature: 97 9 °F (36 6 °C) (09/08/22 1523)  Temp Source: Oral (09/08/22 1523)  Respirations: 20 (09/08/22 1814)  SpO2: 96 % (09/08/22 1814)    Physical Exam  Constitutional:       General: He is not in acute distress  Appearance: He is not ill-appearing or diaphoretic  HENT:      Head: Normocephalic and atraumatic  Eyes:      General: No scleral icterus  Cardiovascular:      Rate and Rhythm: Normal rate and regular rhythm  Pulses: Normal pulses  Heart sounds: Normal heart sounds  Pulmonary:      Effort: Pulmonary effort is normal       Breath sounds: Normal breath sounds  Abdominal:      General: Abdomen is flat  There is no distension  Palpations: Abdomen is soft  There is no mass  Tenderness: There is no abdominal tenderness  There is no guarding or rebound  Musculoskeletal:      Right lower leg: No edema  Left lower leg: No edema  Skin:     Findings: Lesion present  Neurological:      Mental Status: He is alert  Mental status is at baseline  Additional Data:     Lab Results:  Results from last 7 days   Lab Units 09/08/22  1632   WBC Thousand/uL 7 02   HEMOGLOBIN g/dL 12 1   HEMATOCRIT % 37 9   PLATELETS Thousands/uL 177   NEUTROS PCT % 70   LYMPHS PCT % 20   MONOS PCT % 8   EOS PCT % 1     Results from last 7 days   Lab Units 09/08/22  1632   SODIUM mmol/L 142   POTASSIUM mmol/L 5 0   CHLORIDE mmol/L 109*   CO2 mmol/L 27   BUN mg/dL 39*   CREATININE mg/dL 1 72*   ANION GAP mmol/L 6   CALCIUM mg/dL 8 5   ALBUMIN g/dL 3 5   TOTAL BILIRUBIN mg/dL 0 38   ALK PHOS U/L 70   ALT U/L 5*   AST U/L 18   GLUCOSE RANDOM mg/dL 114                       Imaging: No pertinent imaging reviewed  No orders to display       EKG and Other Studies Reviewed on Admission:   · EKG: No EKG obtained  ** Please Note: This note has been constructed using a voice recognition system   **

## 2022-09-08 NOTE — ED PROVIDER NOTES
History  Chief Complaint   Patient presents with    Failure To Thrive     Pt brought in by EMS from [facility] facility in [place]  They state that they are unable to care for him anymore because of his worsening dementia  EMS states that the nursing facility called the state to tell them that they cannot care for him, and the state said to call 911  The patient denies any pain or complaints  Ronak West is a 80 y o  male who presents via EMS  According to EMS and the patient, [facility] sent him in because they were no longer able to care for him, with reported increased dementia  The patient denies any specific complaints  History provided by:  Patient and medical records   used: No    Altered Mental Status  Presenting symptoms: confusion (chronic), disorientation and memory loss    Severity:  Moderate  Episode history:  Continuous  Timing:  Constant  Progression:  Worsening  Chronicity:  Chronic  Context: dementia and nursing home resident    Associated symptoms: no fever, no nausea, no palpitations, no rash, no seizures, no vomiting and no weakness        Prior to Admission Medications   Prescriptions Last Dose Informant Patient Reported? Taking?    Diclofenac Sodium (VOLTAREN) 1 %   No No   Sig: Apply 2 g topically 4 (four) times a day   QUEtiapine (SEROquel) 50 mg tablet   No No   Sig: Take 1 tablet (50 mg total) by mouth daily at bedtime   Patient taking differently: Take 100 mg by mouth daily at bedtime   citalopram (CeleXA) 20 mg tablet 9/8/2022 at Unknown time  Yes Yes   Sig: Take 1 tablet by mouth daily   clopidogrel (PLAVIX) 75 mg tablet 9/8/2022 at Unknown time  Yes Yes   Sig: Take 1 tablet by mouth daily   donepezil (ARICEPT) 10 mg tablet   No No   Sig: Take 1 tablet (10 mg total) by mouth daily at bedtime   gabapentin (NEURONTIN) 100 mg capsule   Yes No   Sig: Take 100 mg by mouth 2 (two) times a day   metoprolol tartrate (LOPRESSOR) 50 mg tablet 9/8/2022 at Unknown time Yes Yes   Sig: Take 1 tablet by mouth daily   predniSONE 20 mg tablet   Yes No   Sig: Take 10 mg by mouth daily   zinc oxide 20 % ointment   No No   Sig: Apply topically as needed for irritation      Facility-Administered Medications: None       Past Medical History:   Diagnosis Date    Anxiety     Benign prostate hyperplasia     Dementia (HCC)     Neuropathy     RA (rheumatoid arthritis) ([facility] [facility] )        History reviewed  No pertinent surgical history  History reviewed  No pertinent family history  I have reviewed and agree with the history as documented  E-Cigarette/Vaping    E-Cigarette Use Never User      E-Cigarette/Vaping Substances     Social History     Tobacco Use    Smoking status: Never Smoker    Smokeless tobacco: Never Used   Vaping Use    Vaping Use: Never used   Substance Use Topics    Alcohol use: Not Currently    Drug use: Never       Review of Systems   Constitutional: Negative for chills, diaphoresis and fever  Respiratory: Negative for shortness of breath  Cardiovascular: Negative for chest pain and palpitations  Gastrointestinal: Negative for diarrhea, nausea and vomiting  Genitourinary: Negative for dysuria and frequency  Skin: Negative for rash  Neurological: Negative for seizures and weakness  Psychiatric/Behavioral: Positive for confusion (chronic) and memory loss  All other systems reviewed and are negative  Physical Exam  Physical Exam  Vitals and nursing note reviewed  Constitutional:       General: He is not in acute distress  Appearance: He is well-developed  HENT:      Head: Normocephalic and atraumatic  Eyes:      Extraocular Movements: Extraocular movements intact  Pupils: Pupils are equal, round, and reactive to light  Neck:      Vascular: No JVD  Cardiovascular:      Rate and Rhythm: Normal rate and regular rhythm  Heart sounds: Normal heart sounds  No murmur heard  No friction rub  No gallop     Pulmonary:      Effort: Pulmonary effort is normal  No respiratory distress  Breath sounds: Normal breath sounds  No wheezing or rales  Chest:      Chest wall: No tenderness  Musculoskeletal:         General: No tenderness  Normal range of motion  Cervical back: Normal range of motion  Skin:     General: Skin is warm and dry  Neurological:      General: No focal deficit present  Mental Status: He is alert  Mental status is at baseline  He is disoriented  Psychiatric:         Behavior: Behavior normal          Thought Content: Thought content normal          Judgment: Judgment normal          Vital Signs  ED Triage Vitals [09/08/22 1523]   Temperature Pulse Respirations Blood Pressure SpO2   97 9 °F (36 6 °C) (!) 53 16 125/59 99 %      Temp Source Heart Rate Source Patient Position - Orthostatic VS BP Location FiO2 (%)   Oral Monitor Lying Right arm --      Pain Score       --           Vitals:    09/08/22 1523 09/08/22 1814   BP: 125/59 (!) 211/84   Pulse: (!) 53 72   Patient Position - Orthostatic VS: Lying Lying         Visual Acuity      ED Medications  Medications   sodium chloride 0 9 % bolus 1,000 mL (0 mL Intravenous Stopped 9/8/22 1849)       Diagnostic Studies  Results Reviewed     Procedure Component Value Units Date/Time    TSH [[id number]]  (Normal) Collected: 09/08/22 1632    Lab Status: Final result Specimen: Blood from Arm, Right Updated: 09/08/22 1726     TSH 3RD GENERATON 1 343 uIU/mL     Narrative:      Patients undergoing fluorescein dye angiography may retain small amounts of fluorescein in the body for 48-72 hours post procedure  Samples containing fluorescein can produce falsely depressed TSH values  If the patient had this procedure,a specimen should be resubmitted post fluorescein clearance        FLU/RSV/COVID - if FLU/RSV clinically relevant [[id number]]  (Normal) Collected: 09/08/22 1632    Lab Status: Final result Specimen: Nares from Nose Updated: 09/08/22 1725     SARS-CoV-2 Negative INFLUENZA A PCR Negative     INFLUENZA B PCR Negative     RSV PCR Negative    Narrative:      FOR PEDIATRIC PATIENTS - copy/paste COVID Guidelines URL to browser: [email or web address]  "[employer]x    SARS-CoV-2 assay is a Nucleic Acid Amplification assay intended for the  qualitative detection of nucleic acid from SARS-CoV-2 in nasopharyngeal  swabs  Results are for the presumptive identification of SARS-CoV-2 RNA  Positive results are indicative of infection with SARS-CoV-2, the virus  causing COVID-19, but do not rule out bacterial infection or co-infection  with other viruses  Laboratories within the [place] and its  territories are required to report all positive results to the appropriate  public health authorities  Negative results do not preclude SARS-CoV-2  infection and should not be used as the sole basis for treatment or other  patient management decisions  Negative results must be combined with  clinical observations, patient history, and epidemiological information  This test has not been FDA cleared or approved  This test has been authorized by FDA under an Emergency Use Authorization  (EUA)  This test is only authorized for the duration of time the  declaration that circumstances exist justifying the authorization of the  emergency use of an in vitro diagnostic tests for detection of SARS-CoV-2  virus and/or diagnosis of COVID-19 infection under section 564(b)(1) of  the Act, 21 U  S C  656DGT-0(J)(8), unless the authorization is terminated  or revoked sooner  The test has been validated but independent review by FDA  and CLIA is pending  Test performed using [employer] Gene[employer]pert: This RT-PCR assay targets N2,  a region unique to SARS-CoV-2  A conserved region in the E-gene was chosen  for pan-Sarbecovirus detection which includes SARS-CoV-2      Comprehensive metabolic panel [[id number]]  (Abnormal) Collected: 09/08/22 8732    Lab Status: Final result Specimen: Blood from Arm, Right Updated: 09/08/22 1712     Sodium 142 mmol/L      Potassium 5 0 mmol/L      Chloride 109 mmol/L      CO2 27 mmol/L      ANION GAP 6 mmol/L      BUN 39 mg/dL      Creatinine 1 72 mg/dL      Glucose 114 mg/dL      Calcium 8 5 mg/dL      AST 18 U/L      ALT 5 U/L      Alkaline Phosphatase 70 U/L      Total Protein 5 9 g/dL      Albumin 3 5 g/dL      Total Bilirubin 0 38 mg/dL      eGFR 34 ml/min/1 73sq m     Narrative:      National Kidney Disease Foundation guidelines for Chronic Kidney Disease (CKD):     Stage 1 with normal or high GFR (GFR > 90 mL/min/1 73 square meters)    Stage 2 Mild CKD (GFR = 60-89 mL/min/1 73 square meters)    Stage 3A Moderate CKD (GFR = 45-59 mL/min/1 73 square meters)    Stage 3B Moderate CKD (GFR = 30-44 mL/min/1 73 square meters)    Stage 4 Severe CKD (GFR = 15-29 mL/min/1 73 square meters)    Stage 5 End Stage CKD (GFR <15 mL/min/1 73 square meters)  Note: GFR calculation is accurate only with a steady state creatinine    CBC and differential [[id number]]  (Abnormal) Collected: 09/08/22 1632    Lab Status: Final result Specimen: Blood from Arm, Right Updated: 09/08/22 1642     WBC 7 02 Thousand/uL      RBC 3 83 Million/uL      Hemoglobin 12 1 g/dL      Hematocrit 37 9 %      MCV 99 fL      MCH 31 6 pg      MCHC 31 9 g/dL      RDW 14 1 %      MPV 10 0 fL      Platelets 517 Thousands/uL      nRBC 0 /100 WBCs      Neutrophils Relative 70 %      Immat GRANS % 1 %      Lymphocytes Relative 20 %      Monocytes Relative 8 %      Eosinophils Relative 1 %      Basophils Relative 0 %      Neutrophils Absolute 4 88 Thousands/µL      Immature Grans Absolute 0 05 Thousand/uL      Lymphocytes Absolute 1 43 Thousands/µL      Monocytes Absolute 0 59 Thousand/µL      Eosinophils Absolute 0 05 Thousand/µL      Basophils Absolute 0 02 Thousands/µL     Rapid drug screen, urine [[id number]]     Lab Status: No result Specimen: Urine                  No orders to display              Procedures  Procedures         ED Course                                             MDM  Number of Diagnoses or Management Options  Dementia [facility]): new and requires workup  Diagnosis management comments: Patient with possible worsening dementia, will do a rogers-psych workup and will reach out to the NH for further elaboration  Amount and/or Complexity of Data Reviewed  Clinical lab tests: ordered and reviewed    Risk of Complications, Morbidity, and/or Mortality  Presenting problems: high  Diagnostic procedures: high  Management options: high    Patient Progress  Patient progress: stable      Disposition  Final diagnoses:   Dementia ([address] )     Time reflects when diagnosis was documented in both MDM as applicable and the Disposition within this note     Time User Action Codes Description Comment    9/8/2022  6:52 PM Lynne You Add [F03 [id number]] Dementia [facility])       ED Disposition     ED Disposition   Admit    Condition   Stable    Date/Time   Thu Sep 8, 2022  6:52 PM    Comment   Case was discussed with Dr Shantal Beth and the patient's admission status was agreed to be Admission Status: observation status to the service of Dr Shantal Beth   Follow-up Information    None         Patient's Medications   Discharge Prescriptions    No medications on file       No discharge procedures on file      PDMP Review     None          ED Provider  Electronically Signed by           Tello Glasgow MD  09/08/22 0604

## 2022-09-09 LAB
AMPHETAMINES SERPL QL SCN: NEGATIVE
ATRIAL RATE: 44 BPM
ATRIAL RATE: 46 BPM
ATRIAL RATE: 51 BPM
BARBITURATES UR QL: NEGATIVE
BENZODIAZ UR QL: NEGATIVE
COCAINE UR QL: NEGATIVE
METHADONE UR QL: NEGATIVE
OPIATES UR QL SCN: NEGATIVE
OXYCODONE+OXYMORPHONE UR QL SCN: NEGATIVE
P AXIS: 57 DEGREES
P AXIS: 57 DEGREES
P AXIS: 60 DEGREES
PCP UR QL: NEGATIVE
PR INTERVAL: 188 MS
PR INTERVAL: 188 MS
PR INTERVAL: 190 MS
QRS AXIS: 80 DEGREES
QRS AXIS: 82 DEGREES
QRS AXIS: 82 DEGREES
QRSD INTERVAL: 126 MS
QRSD INTERVAL: 128 MS
QRSD INTERVAL: 134 MS
QT INTERVAL: 504 MS
QT INTERVAL: 524 MS
QT INTERVAL: 528 MS
QTC INTERVAL: 448 MS
QTC INTERVAL: 462 MS
QTC INTERVAL: 464 MS
T WAVE AXIS: 40 DEGREES
T WAVE AXIS: 49 DEGREES
T WAVE AXIS: 50 DEGREES
THC UR QL: NEGATIVE
VENTRICULAR RATE: 44 BPM
VENTRICULAR RATE: 46 BPM
VENTRICULAR RATE: 51 BPM

## 2022-09-09 PROCEDURE — 93010 ELECTROCARDIOGRAM REPORT: CPT | Performed by: INTERNAL MEDICINE

## 2022-09-09 PROCEDURE — 80307 DRUG TEST PRSMV CHEM ANLYZR: CPT | Performed by: INTERNAL MEDICINE

## 2022-09-09 PROCEDURE — 99232 SBSQ HOSP IP/OBS MODERATE 35: CPT | Performed by: PHYSICIAN ASSISTANT

## 2022-09-09 PROCEDURE — 93005 ELECTROCARDIOGRAM TRACING: CPT

## 2022-09-09 RX ADMIN — CLOPIDOGREL BISULFATE 75 MG: 75 TABLET ORAL at 10:07

## 2022-09-09 RX ADMIN — METOPROLOL TARTRATE 50 MG: 50 TABLET, FILM COATED ORAL at 10:07

## 2022-09-09 RX ADMIN — DONEPEZIL HYDROCHLORIDE 10 MG: 5 TABLET ORAL at 22:28

## 2022-09-09 RX ADMIN — ENOXAPARIN SODIUM 40 MG: 40 INJECTION SUBCUTANEOUS at 10:11

## 2022-09-09 RX ADMIN — QUETIAPINE FUMARATE 100 MG: 100 TABLET ORAL at 22:28

## 2022-09-09 RX ADMIN — CITALOPRAM HYDROBROMIDE 20 MG: 20 TABLET ORAL at 10:07

## 2022-09-09 RX ADMIN — GABAPENTIN 100 MG: 100 CAPSULE ORAL at 18:06

## 2022-09-09 RX ADMIN — PREDNISONE 10 MG: 10 TABLET ORAL at 10:07

## 2022-09-09 RX ADMIN — GABAPENTIN 100 MG: 100 CAPSULE ORAL at 10:07

## 2022-09-09 NOTE — PLAN OF CARE
Problem: SAFETY ADULT  Goal: Patient will remain free of falls  Description: INTERVENTIONS:  - Educate patient/family on patient safety including physical limitations  - Instruct patient to call for assistance with activity   - Consult OT/PT to assist with strengthening/mobility   - Keep Call bell within reach  - Keep bed low and locked with side rails adjusted as appropriate  - Keep care items and personal belongings within reach  - Initiate and maintain comfort rounds  - Make Fall Risk Sign visible to staff  - Offer Toileting every  Hours, in advance of need  - Initiate/Maintain alarm  - Obtain necessary fall risk management equipment:   - Apply yellow socks and bracelet for high fall risk patients  - Consider moving patient to room near nurses station  Outcome: Progressing  Goal: Maintain or return to baseline ADL function  Description: INTERVENTIONS:  -  Assess patient's ability to carry out ADLs; assess patient's baseline for ADL function and identify physical deficits which impact ability to perform ADLs (bathing, care of mouth/teeth, toileting, grooming, dressing, etc )  - Assess/evaluate cause of self-care deficits   - Assess range of motion  - Assess patient's mobility; develop plan if impaired  - Assess patient's need for assistive devices and provide as appropriate  - Encourage maximum independence but intervene and supervise when necessary  - Involve family in performance of ADLs  - Assess for home care needs following discharge   - Consider OT consult to assist with ADL evaluation and planning for discharge  - Provide patient education as appropriate  Outcome: Progressing  Goal: Maintains/Returns to pre admission functional level  Description: INTERVENTIONS:  - Perform BMAT or MOVE assessment daily    - Set and communicate daily mobility goal to care team and patient/family/caregiver     - Collaborate with rehabilitation services on mobility goals if consulted  - Perform Range of Motion 2 times a day   - Reposition patient every 2 hours  - Dangle patient 2 times a day  - Stand patient 2 times a day  - Ambulate patient 2 times a day  - Out of bed to chair 2 times a day   - Out of bed for meals 2 times a day  - Out of bed for toileting  - Record patient progress and toleration of activity level   Outcome: Progressing     Problem: DISCHARGE PLANNING  Goal: Discharge to home or other facility with appropriate resources  Description: INTERVENTIONS:  - Identify barriers to discharge w/patient and caregiver  - Arrange for needed discharge resources and transportation as appropriate  - Identify discharge learning needs (meds, wound care, etc )  - Arrange for interpretive services to assist at discharge as needed  - Refer to Case Management Department for coordinating discharge planning if the patient needs post-hospital services based on physician/advanced practitioner order or complex needs related to functional status, cognitive ability, or social support system  Outcome: Progressing     Problem: Knowledge Deficit  Goal: Patient/family/caregiver demonstrates understanding of disease process, treatment plan, medications, and discharge instructions  Description: Complete learning assessment and assess knowledge base    Interventions:  - Provide teaching at level of understanding  - Provide teaching via preferred learning methods  Outcome: Progressing

## 2022-09-09 NOTE — ASSESSMENT & PLAN NOTE
· Patient transferred from current facility for questionable dementia with agitated features  · Patient has no idea why he is here, unable to give any pertinent history  · Per discussion with ER his current facility is unable to care for him however no one at that facility could answer the phone for conversation   · Per patient's daughter facility taller he had an abscess in his buttocks which required evaluation  I am only revealing a stage II decubitus as below    · PT/OT  · CM for likely LTC

## 2022-09-09 NOTE — UTILIZATION REVIEW
Initial Clinical Review    OBS 9/8 @ 1852 UPGRADED TO INPATIENT 9/9 @ 1053 FOR CONTINUED TX FOR DEMENTIA WITH AGITATED FEATURES AND NEED FOR SAFE D/C PLAN      Start   Ordered   09/09/22 1054  Inpatient Admission  Once        Transfer Service: Hospitalist       Question Answer Comment   Level of Care Med Surg        09/09/22 1053     ED Arrival Information     Expected   -    Arrival   9/8/2022 15:17    Acuity   Urgent            Means of arrival   Ambulance    Escorted by   [facility]    Service   Hospitalist    Admission type   Urgent            Arrival complaint   Failure to Thrive           Chief Complaint   Patient presents with    Failure To Thrive     Pt brought in by EMS from [facility] in [place]  They state that they are unable to care for him anymore because of his worsening dementia  EMS states that the nursing facility called the state to tell them that they cannot care for him, and the state said to call 911  The patient denies any pain or complaints  Initial Presentation: 80 y o  male with PMHx of dementia, RA, and HTN presents to ED by ems from his assisted living facility with question of abscess as well as dementia with increasing agitation  Pt is unable to provide further history and extensive review systems negative d/t underlying dementia  Pt's daughter reports that the facility called her today and said he had an abscess and was c/o dysuria and that they could not care for him  ED was under the impression that facility can longer care for him and they were trying to find alternative placement, and was unable so sent to the hospital for evaluation  ED provider unable to reach anyone at facility to question  On exam pt noted with stage II sacral ducub, no abscess noted  Pt alert, mental status at baseline  BUN 39, Cr 1 72    ADMIT to M/S UNIT under observation with ALZHEIMER'S DISEASE with BEHAVIORAL DISTURBANCE and SACRAL WOUND -- consult wound care  Continue pta po meds  Care management consulted to assist with d/c plan    Date: 9/9   Day 2:  No events overnight  Pt voices no complaints  He is confused  Out of restraints currently  Sacral wound does not appear infected  Continue offloading and local wound care  PT/OT evals for d/c assistance  CM consulted for d/c planning  Continue pta po meds, supportive care  Reg diet, SCDs   OOB with assist      ED Triage Vitals   Temperature Pulse Respirations Blood Pressure SpO2   09/08/22 1523 09/08/22 1523 09/08/22 1523 09/08/22 1523 09/08/22 1523   97 9 °F (36 6 °C) (!) 53 16 125/59 99 %      Temp Source Heart Rate Source Patient Position - Orthostatic VS BP Location FiO2 (%)   09/08/22 1523 09/08/22 1523 09/08/22 1523 09/08/22 1523 --   Oral Monitor Lying Right arm       Pain Score       09/08/22 2000       No Pain          Wt Readings from Last 1 Encounters:   06/18/22 98 1 kg (216 lb 4 3 oz)     Additional Vital Signs:   Date/Time Temp Pulse Resp BP MAP (mmHg) SpO2 O2 Device Patient Position - Orthostatic VS   09/09/22 0745 97 5 °F (36 4 °C) 52 Abnormal  18 115/65 82 94 % -- Lying   09/08/22 2100 -- 72 18 152/72 104 96 % None (Room air) --   09/08/22 2030 -- -- -- -- -- 97 % -- --   09/08/22 2000 -- 77 18 167/70 100 96 % None (Room air) Lying   09/08/22 1814 -- 72 20 211/84 Abnormal  120 96 % None (Room air) Lying   09/08/22 1530 -- -- -- -- -- -- None (Room air) --   09/08/22 1524 -- -- -- -- -- 96 % -- --   09/08/22 1523 97 9 °F (36 6 °C) 53 Abnormal  16 125/59 85 99 % None (Room air) Lying       Pertinent Labs/Diagnostic Test Results:   Results from last 7 days   Lab Units 09/08/22  1632   SARS-COV-2  Negative     Results from last 7 days   Lab Units 09/08/22  1632   WBC Thousand/uL 7 02   HEMOGLOBIN g/dL 12 1   HEMATOCRIT % 37 9   PLATELETS Thousands/uL 177   NEUTROS ABS Thousands/µL 4 88     Results from last 7 days   Lab Units 09/08/22  1632   SODIUM mmol/L 142   POTASSIUM mmol/L 5 0   CHLORIDE mmol/L 109* CO2 mmol/L 27   ANION GAP mmol/L 6   BUN mg/dL 39*   CREATININE mg/dL 1 72*   EGFR ml/min/1 73sq m 34   CALCIUM mg/dL 8 5     Results from last 7 days   Lab Units 09/08/22  1632   AST U/L 18   ALT U/L 5*   ALK PHOS U/L 70   TOTAL PROTEIN g/dL 5 9*   ALBUMIN g/dL 3 5   TOTAL BILIRUBIN mg/dL 0 38     Results from last 7 days   Lab Units 09/08/22  1632   GLUCOSE RANDOM mg/dL 114     Results from last 7 days   Lab Units 09/08/22  1632   TSH 3RD GENERATON uIU/mL 1 343     Results from last 7 days   Lab Units 09/08/22  1632   INFLUENZA A PCR  Negative   INFLUENZA B PCR  Negative   RSV PCR  Negative       ED Treatment:   Medication Administration from 09/08/2022 1517 to 09/08/2022 2323       Date/Time Order Dose Route Action     09/08/2022 1634 sodium chloride 0 9 % bolus 1,000 mL 1,000 mL Intravenous New Bag     09/08/2022 2158 donepezil (ARICEPT) tablet 10 mg 10 mg Oral Given     09/08/2022 2127 gabapentin (NEURONTIN) capsule 100 mg 100 mg Oral Given     09/08/2022 2154 QUEtiapine (SEROquel) tablet 100 mg 100 mg Oral Given     Past Medical History:   Diagnosis Date    Anxiety     Benign prostate hyperplasia     Dementia ([facility] )     Neuropathy     RA (rheumatoid arthritis) ([facility] )      Present on Admission:   [facility]   Primary hypertension   Rheumatoid arthritis with positive rheumatoid factor (HCC)      Admitting Diagnosis: Dementia ([facility] ) [F03 90]  Failure to thrive in adult [R62 7]  Age/Sex: 80 y o  male  Admission Orders:  Scheduled Medications:  citalopram, 20 mg, Oral, Daily  clopidogrel, 75 mg, Oral, Daily  donepezil, 10 mg, Oral, HS  enoxaparin, 40 mg, Subcutaneous, Daily  gabapentin, 100 mg, Oral, BID  metoprolol tartrate, 50 mg, Oral, Daily  predniSONE, 10 mg, Oral, Daily  QUEtiapine, 100 mg, Oral, HS    PRN Meds:  acetaminophen, 650 mg, Oral, Q6H PRN  aluminum-magnesium hydroxide-simethicone, 30 mL, Oral, Q6H PRN  ondansetron, 4 mg, Intravenous, Q6H PRN  polyethylene glycol, 17 g, Oral, Daily PRN        Network Utilization Review Department  ATTENTION: Please call with any questions or concerns to [phone] and carefully listen to the prompts so that you are directed to the right person  All voicemails are confidential   Francis Mcclelland all requests for admission clinical reviews, approved or denied determinations and any other requests to dedicated fax number below belonging to the campus where the patient is receiving treatment   List of dedicated fax numbers for the Facilities:  [address] DENIALS (Administrative/Medical Necessity) [phone]   [address] (Maternity/NICU/Pediatrics) [phone]   [address]  [address] [facility] [place] [id number] [address] Janak Swartz [address] [facility] [facility] [phone]

## 2022-09-09 NOTE — PLAN OF CARE
Problem: INFECTION - ADULT  Goal: Absence or prevention of progression during hospitalization  Description: INTERVENTIONS:  - Assess and monitor for signs and symptoms of infection  - Monitor lab/diagnostic results  - Monitor all insertion sites, i e  indwelling lines, tubes, and drains  - Monitor endotracheal if appropriate and nasal secretions for changes in amount and color  - Dixon appropriate cooling/warming therapies per order  - Administer medications as ordered  - Instruct and encourage patient and family to use good hand hygiene technique  - Identify and instruct in appropriate isolation precautions for identified infection/condition  Outcome: Progressing  Goal: Absence of fever/infection during neutropenic period  Description: INTERVENTIONS:  - Monitor WBC    Outcome: Progressing     Problem: SAFETY ADULT  Goal: Patient will remain free of falls  Description: INTERVENTIONS:  - Educate patient/family on patient safety including physical limitations  - Instruct patient to call for assistance with activity   - Consult OT/PT to assist with strengthening/mobility   - Keep Call bell within reach  - Keep bed low and locked with side rails adjusted as appropriate  - Keep care items and personal belongings within reach  - Initiate and maintain comfort rounds  - Make Fall Risk Sign visible to staff  - Offer Toileting every  Hours, in advance of need  - Initiate/Maintain alarm  - Obtain necessary fall risk management equipment:   - Apply yellow socks and bracelet for high fall risk patients  - Consider moving patient to room near nurses station  Outcome: Progressing  Goal: Maintain or return to baseline ADL function  Description: INTERVENTIONS:  -  Assess patient's ability to carry out ADLs; assess patient's baseline for ADL function and identify physical deficits which impact ability to perform ADLs (bathing, care of mouth/teeth, toileting, grooming, dressing, etc )  - Assess/evaluate cause of self-care deficits   - Assess range of motion  - Assess patient's mobility; develop plan if impaired  - Assess patient's need for assistive devices and provide as appropriate  - Encourage maximum independence but intervene and supervise when necessary  - Involve family in performance of ADLs  - Assess for home care needs following discharge   - Consider OT consult to assist with ADL evaluation and planning for discharge  - Provide patient education as appropriate  Outcome: Progressing  Goal: Maintains/Returns to pre admission functional level  Description: INTERVENTIONS:  - Perform BMAT or MOVE assessment daily    - Set and communicate daily mobility goal to care team and patient/family/caregiver  - Collaborate with rehabilitation services on mobility goals if consulted  - Perform Range of Motion  times a day  - Reposition patient every  hours    - Dangle patient  times a day  - Stand patient  times a day  - Ambulate patient  times a day  - Out of bed to chair  times a day   - Out of bed for meals  times a day  - Out of bed for toileting  - Record patient progress and toleration of activity level   Outcome: Progressing     Problem: DISCHARGE PLANNING  Goal: Discharge to home or other facility with appropriate resources  Description: INTERVENTIONS:  - Identify barriers to discharge w/patient and caregiver  - Arrange for needed discharge resources and transportation as appropriate  - Identify discharge learning needs (meds, wound care, etc )  - Arrange for interpretive services to assist at discharge as needed  - Refer to Case Management Department for coordinating discharge planning if the patient needs post-hospital services based on physician/advanced practitioner order or complex needs related to functional status, cognitive ability, or social support system  Outcome: Progressing     Problem: Knowledge Deficit  Goal: Patient/family/caregiver demonstrates understanding of disease process, treatment plan, medications, and discharge instructions  Description: Complete learning assessment and assess knowledge base    Interventions:  - Provide teaching at level of understanding  - Provide teaching via preferred learning methods  Outcome: Progressing     Problem: Prexisting or High Potential for Compromised Skin Integrity  Goal: Skin integrity is maintained or improved  Description: INTERVENTIONS:  - Identify patients at risk for skin breakdown  - Assess and monitor skin integrity  - Assess and monitor nutrition and hydration status  - Monitor labs   - Assess for incontinence   - Turn and reposition patient  - Assist with mobility/ambulation  - Relieve pressure over bony prominences  - Avoid friction and shearing  - Provide appropriate hygiene as needed including keeping skin clean and dry  - Evaluate need for skin moisturizer/barrier cream  - Collaborate with interdisciplinary team   - Patient/family teaching  - Consider wound care consult   Outcome: Progressing     Problem: Potential for Falls  Goal: Patient will remain free of falls  Description: INTERVENTIONS:  - Educate patient/family on patient safety including physical limitations  - Instruct patient to call for assistance with activity   - Consult OT/PT to assist with strengthening/mobility   - Keep Call bell within reach  - Keep bed low and locked with side rails adjusted as appropriate  - Keep care items and personal belongings within reach  - Initiate and maintain comfort rounds  - Make Fall Risk Sign visible to staff  - Offer Toileting every  Hours, in advance of need  - Initiate/Maintain alarm  - Obtain necessary fall risk management equipment:   - Apply yellow socks and bracelet for high fall risk patients  - Consider moving patient to room near nurses station  Outcome: Progressing     Problem: MOBILITY - ADULT  Goal: Maintain or return to baseline ADL function  Description: INTERVENTIONS:  -  Assess patient's ability to carry out ADLs; assess patient's baseline for ADL function and identify physical deficits which impact ability to perform ADLs (bathing, care of mouth/teeth, toileting, grooming, dressing, etc )  - Assess/evaluate cause of self-care deficits   - Assess range of motion  - Assess patient's mobility; develop plan if impaired  - Assess patient's need for assistive devices and provide as appropriate  - Encourage maximum independence but intervene and supervise when necessary  - Involve family in performance of ADLs  - Assess for home care needs following discharge   - Consider OT consult to assist with ADL evaluation and planning for discharge  - Provide patient education as appropriate  Outcome: Progressing  Goal: Maintains/Returns to pre admission functional level  Description: INTERVENTIONS:  - Perform BMAT or MOVE assessment daily    - Set and communicate daily mobility goal to care team and patient/family/caregiver  - Collaborate with rehabilitation services on mobility goals if consulted  - Perform Range of Motion  times a day  - Reposition patient every  hours    - Dangle patient  times a day  - Stand patient  times a day  - Ambulate patient  times a day  - Out of bed to chair  times a day   - Out of bed for meals times a day  - Out of bed for toileting  - Record patient progress and toleration of activity level   Outcome: Progressing

## 2022-09-09 NOTE — PROGRESS NOTES
[facility]  Progress Note - Cassidy Borden 9/7/1931, 80 y o  male MRN: [id number]  Unit/Bed#: W -01 Encounter: [id number]  Primary Care Provider: No primary care provider on file  Date and time admitted to hospital: 9/8/2022  3:17 PM    * Late onset Alzheimer's disease with behavioral disturbance ([facility] )  Assessment & Plan  · Patient transferred from current facility for questionable dementia with agitated features  · Patient has no idea why he is here, unable to give any pertinent history  · Per discussion with ER his current facility is unable to care for him however no one at that facility could answer the phone for conversation   · Per patient's daughter facility taller he had an abscess in his buttocks which required evaluation  I am only revealing a stage II decubitus as below  · PT/OT  · CM for likely LTC    Pressure injury of sacral region, stage 2 ([facility] )  Assessment & Plan  · Consult wound care  · Does not appear infected  · Offloading and local wound care    Primary hypertension  Assessment & Plan  · Continue outpatient medications    Rheumatoid arthritis with positive rheumatoid factor (HCC)  Assessment & Plan  · On chronic steroids will continue      VTE Pharmacologic Prophylaxis: VTE Score: 4 Moderate Risk (Score 3-4) - Pharmacological DVT Prophylaxis Ordered: enoxaparin (Lovenox)  Patient Centered Rounds: I performed bedside rounds with nursing staff today  Discussions with Specialists or Other Care Team Provider: cm, nursing    Education and Discussions with Family / Patient: Updated  (daughter) via phone  Called and spoke with Josie at   Called daughter Damian Townsend [phone] and left LM for callback  Time Spent for Care: 30 minutes  More than 50% of total time spent on counseling and coordination of care as described above      Current Length of Stay: 0 day(s)  Current Patient Status: Inpatient   Certification Statement: The patient, admitted on an observation basis, will now require > 2 midnight hospital stay due to PT/OT, awaiting LTC vs rehab  Discharge Plan: Anticipate discharge in >72 hrs to discharge location to be determined pending rehab evaluations  Code Status: Level 1 - Full Code    Subjective:   No overnight events reported by nursing  He is confused  Out of restraints presently  He voices no complaints  Objective:     Vitals:   Temp (24hrs), Av 7 °F (36 5 °C), Min:97 5 °F (36 4 °C), Max:97 9 °F (36 6 °C)    Temp:  [97 5 °F (36 4 °C)-97 9 °F (36 6 °C)] 97 5 °F (36 4 °C)  HR:  [52-77] 52  Resp:  [16-20] 18  BP: (115-211)/(59-84) 115/65  SpO2:  [94 %-99 %] 94 %  There is no height or weight on file to calculate BMI  Input and Output Summary (last 24 hours): Intake/Output Summary (Last 24 hours) at 2022 1054  Last data filed at 2022 1849  Gross per 24 hour   Intake 1000 ml   Output --   Net 1000 ml       Physical Exam:   Physical Exam  Vitals and nursing note reviewed  Constitutional:       General: He is not in acute distress  Appearance: Normal appearance  HENT:      Head: Normocephalic  Mouth/Throat:      Mouth: Mucous membranes are moist    Eyes:      Pupils: Pupils are equal, round, and reactive to light  Cardiovascular:      Rate and Rhythm: Normal rate and regular rhythm  Heart sounds: No murmur heard  Pulmonary:      Effort: Pulmonary effort is normal  No respiratory distress  Breath sounds: Normal breath sounds  No wheezing, rhonchi or rales  Abdominal:      General: Bowel sounds are normal  There is no distension  Palpations: Abdomen is soft  Tenderness: There is no abdominal tenderness  There is no guarding  Musculoskeletal:         General: No deformity  Cervical back: Normal range of motion  Right lower leg: No edema  Left lower leg: No edema  Skin:     Capillary Refill: Capillary refill takes less than 2 seconds        Comments: + wound on sacrum, no erythema or drainage   Neurological:      General: No focal deficit present  Mental Status: He is alert  Mental status is at baseline  He is disoriented  Additional Data:     Labs:  Results from last 7 days   Lab Units 09/08/22  1632   WBC Thousand/uL 7 02   HEMOGLOBIN g/dL 12 1   HEMATOCRIT % 37 9   PLATELETS Thousands/uL 177   NEUTROS PCT % 70   LYMPHS PCT % 20   MONOS PCT % 8   EOS PCT % 1     Results from last 7 days   Lab Units 09/08/22  1632   SODIUM mmol/L 142   POTASSIUM mmol/L 5 0   CHLORIDE mmol/L 109*   CO2 mmol/L 27   BUN mg/dL 39*   CREATININE mg/dL 1 72*   ANION GAP mmol/L 6   CALCIUM mg/dL 8 5   ALBUMIN g/dL 3 5   TOTAL BILIRUBIN mg/dL 0 38   ALK PHOS U/L 70   ALT U/L 5*   AST U/L 18   GLUCOSE RANDOM mg/dL 114                       Lines/Drains:  Invasive Devices  Report    Peripheral Intravenous Line  Duration           Peripheral IV 09/08/22 Distal;Right;Upper;Ventral (anterior) Arm <1 day                      Imaging: No pertinent imaging reviewed      Recent Cultures (last 7 days):         Last 24 Hours Medication List:   Current Facility-Administered Medications   Medication Dose Route Frequency Provider Last Rate    acetaminophen  650 mg Oral Q6H PRN Jennifer Morales MD      aluminum-magnesium hydroxide-simethicone  30 mL Oral Q6H PRN Jennifer Morales MD      citalopram  20 mg Oral Daily Jennifer Morales MD      clopidogrel  75 mg Oral Daily Jennifer Morales MD      donepezil  10 mg Oral HS Jennifer Morales MD      enoxaparin  40 mg Subcutaneous Daily Jennifer Morales MD      gabapentin  100 mg Oral BID Jennifer Morales MD      metoprolol tartrate  50 mg Oral Daily Jennifer Morales MD      OLANZapine           ondansetron  4 mg Intravenous Q6H PRN Jennifer Morales MD      polyethylene glycol  17 g Oral Daily PRN Jennifer Morales MD      predniSONE  10 mg Oral Daily Jennifer Morales MD      QUEtiapine  100 mg Oral HS Jennifer Morales MD          Today, Patient Was Seen By: Natalie Maki PA-C    **Please Note: This note may have been constructed using a voice recognition system  **

## 2022-09-10 PROCEDURE — 97163 PT EVAL HIGH COMPLEX 45 MIN: CPT

## 2022-09-10 PROCEDURE — 99232 SBSQ HOSP IP/OBS MODERATE 35: CPT | Performed by: PHYSICIAN ASSISTANT

## 2022-09-10 RX ADMIN — CLOPIDOGREL BISULFATE 75 MG: 75 TABLET ORAL at 13:01

## 2022-09-10 RX ADMIN — CITALOPRAM HYDROBROMIDE 20 MG: 20 TABLET ORAL at 13:01

## 2022-09-10 RX ADMIN — POLYETHYLENE GLYCOL 3350 17 G: 17 POWDER, FOR SOLUTION ORAL at 13:01

## 2022-09-10 RX ADMIN — METOPROLOL TARTRATE 25 MG: 25 TABLET, FILM COATED ORAL at 13:01

## 2022-09-10 RX ADMIN — ENOXAPARIN SODIUM 40 MG: 40 INJECTION SUBCUTANEOUS at 13:01

## 2022-09-10 RX ADMIN — DONEPEZIL HYDROCHLORIDE 10 MG: 5 TABLET ORAL at 21:30

## 2022-09-10 RX ADMIN — GABAPENTIN 100 MG: 100 CAPSULE ORAL at 18:17

## 2022-09-10 RX ADMIN — PREDNISONE 10 MG: 10 TABLET ORAL at 13:01

## 2022-09-10 RX ADMIN — QUETIAPINE FUMARATE 100 MG: 100 TABLET ORAL at 21:30

## 2022-09-10 RX ADMIN — GABAPENTIN 100 MG: 100 CAPSULE ORAL at 13:01

## 2022-09-10 NOTE — PLAN OF CARE
Problem: PHYSICAL THERAPY ADULT  Goal: Performs mobility at highest level of function for planned discharge setting  See evaluation for individualized goals  Description: Treatment/Interventions: Functional transfer training, LE strengthening/ROM, Therapeutic exercise, Endurance training, Cognitive reorientation, Equipment eval/education, Patient/family training, Bed mobility, Gait training  Equipment Recommended: Tamra Bedolla       See flowsheet documentation for full assessment, interventions and recommendations  Note: Prognosis: Fair  Problem List: Decreased strength, Decreased endurance, Impaired balance, Decreased mobility, Decreased cognition, Impaired judgement, Decreased safety awareness  Assessment: Pt is a 80 y o  male seen for PT evaluation s/p admit to [facility] on 8/18/2022 w/ UTI (urinary tract infection)  Order placed for PT  Comorbidities affecting pt's physical performance at time of assessment include: HTN and limited cognition  Personal factors affecting pt at time of IE include: advanced age  Prior to admission, pt was living at [facility]  Per chart, facility reports they can no longer take care of the pt  At baseline, the pt reports he is able to ambulate independently without AD, however pt is a questionable historian due to h/o dementia  Upon evaluation: Pt requires supervision for bed mobility, min A for sit to stand, and min A for ambulation with RW  (Please find full objective findings from PT assessment regarding body systems outlined above)  Impairments and limitations also listed above, especially due to  weakness, impaired balance, decreased endurance, gait deviations, decreased activity tolerance, decreased safety awareness, impaired judgement, fall risk and decreased cognition  Pt's clinical presentation is currently unstable/unpredictable seen in pt's presentation of impulsivity, decreased safety awareness, fall risk, and limited insight into deficits    Pt to benefit from continued skilled PT tx while in hospital and upon DC to address deficits as defined above and maximize level of functional mobility  From PT/mobility standpoint, recommendation at time of d/c would be inpatient rehab vs  return to facility with PT services pending level of assist available and true prior level of function  Recommend progression of ambulation and initiation of HEP as appropriate  PT Discharge Recommendation: (S) Post acute rehabilitation services (vs  return to facility with PT services pending level of assist available and true prior level of function)    See flowsheet documentation for full assessment

## 2022-09-10 NOTE — ASSESSMENT & PLAN NOTE
· Patient transferred from current facility for questionable dementia with agitated features  · Patient has no idea why he is here, unable to give any pertinent history  · Per discussion with ER his current facility is unable to care for him however no one at that facility could answer the phone for conversation   · Per patient's daughter facility told her he had an abscess in his buttocks which required evaluation  I am only revealing a stage II decubitus as below    · PT/OT  · CM for likely LTC

## 2022-09-10 NOTE — PROGRESS NOTES
[facility]  Progress Note - Consuella Code 9/7/1931, 80 y o  male MRN: [id number]  Unit/Bed#: W -01 Encounter: [id number]  Primary Care Provider: No primary care provider on file  Date and time admitted to hospital: 9/8/2022  3:17 PM    * Late onset Alzheimer's disease with behavioral disturbance ([facility] [address] )  Assessment & Plan  · Patient transferred from current facility for questionable dementia with agitated features  · Patient has no idea why he is here, unable to give any pertinent history  · Per discussion with ER his current facility is unable to care for him however no one at that facility could answer the phone for conversation   · Per patient's daughter facility told her he had an abscess in his buttocks which required evaluation  I am only revealing a stage II decubitus as below  · PT/OT  · CM for likely LTC    Pressure injury of sacral region, stage 2 ([facility] [address] )  Assessment & Plan  · Consult wound care  · Does not appear infected  · Offloading and local wound care    Primary hypertension  Assessment & Plan  · Decreased Lopressor 25 mg daily due to bradycardia  · Monitor BP    Rheumatoid arthritis with positive rheumatoid factor (HCC)  Assessment & Plan  · On chronic steroids will continue        VTE Pharmacologic Prophylaxis: VTE Score: 4 Moderate Risk (Score 3-4) - Pharmacological DVT Prophylaxis Ordered: enoxaparin (Lovenox)  Patient Centered Rounds: I performed bedside rounds with nursing staff today  Discussions with Specialists or Other Care Team Provider: rn    Education and Discussions with Family / Patient: Attempted to update  (daughter) via phone  Left voicemail  Time Spent for Care: 30 minutes  More than 50% of total time spent on counseling and coordination of care as described above      Current Length of Stay: 1 day(s)  Current Patient Status: Inpatient   Certification Statement: The patient will continue to require additional inpatient hospital stay due to await placement  Discharge Plan: Anticipate discharge in 24-48 hrs to rehab facility  Code Status: Level 1 - Full Code    Subjective:   No overnight events reported  Awaiting LTC placement  Objective:     Vitals:   Temp (24hrs), Av 1 °F (36 7 °C), Min:97 8 °F (36 6 °C), Max:98 4 °F (36 9 °C)    Temp:  [97 8 °F (36 6 °C)-98 4 °F (36 9 °C)] 98 4 °F (36 9 °C)  HR:  [49-58] 58  Resp:  [18-19] 19  BP: (143-172)/(70-82) 172/82  SpO2:  [92 %-96 %] 95 %  There is no height or weight on file to calculate BMI  Input and Output Summary (last 24 hours): Intake/Output Summary (Last 24 hours) at 9/10/2022 0827  Last data filed at 9/10/2022 0455  Gross per 24 hour   Intake --   Output 675 ml   Net -675 ml       Physical Exam:   Physical Exam  Vitals and nursing note reviewed  Constitutional:       General: He is not in acute distress  Appearance: Normal appearance  HENT:      Head: Normocephalic  Mouth/Throat:      Mouth: Mucous membranes are moist    Eyes:      Pupils: Pupils are equal, round, and reactive to light  Cardiovascular:      Rate and Rhythm: Normal rate and regular rhythm  Heart sounds: No murmur heard  Pulmonary:      Effort: Pulmonary effort is normal  No respiratory distress  Breath sounds: Normal breath sounds  No wheezing, rhonchi or rales  Abdominal:      General: Bowel sounds are normal  There is no distension  Palpations: Abdomen is soft  Tenderness: There is no abdominal tenderness  There is no guarding  Musculoskeletal:         General: No deformity  Cervical back: Normal range of motion  Right lower leg: No edema  Left lower leg: No edema  Skin:     Capillary Refill: Capillary refill takes less than 2 seconds  Comments: + wound on sacrum, no erythema or drainage   Neurological:      General: No focal deficit present  Mental Status: He is alert  Mental status is at baseline  He is disoriented  Additional Data:     Labs:  Results from last 7 days   Lab Units 09/08/22  1632   WBC Thousand/uL 7 02   HEMOGLOBIN g/dL 12 1   HEMATOCRIT % 37 9   PLATELETS Thousands/uL 177   NEUTROS PCT % 70   LYMPHS PCT % 20   MONOS PCT % 8   EOS PCT % 1     Results from last 7 days   Lab Units 09/08/22  1632   SODIUM mmol/L 142   POTASSIUM mmol/L 5 0   CHLORIDE mmol/L 109*   CO2 mmol/L 27   BUN mg/dL 39*   CREATININE mg/dL 1 72*   ANION GAP mmol/L 6   CALCIUM mg/dL 8 5   ALBUMIN g/dL 3 5   TOTAL BILIRUBIN mg/dL 0 38   ALK PHOS U/L 70   ALT U/L 5*   AST U/L 18   GLUCOSE RANDOM mg/dL 114                       Lines/Drains:  Invasive Devices  Report    Peripheral Intravenous Line  Duration           Peripheral IV 09/08/22 Distal;Right;Upper;Ventral (anterior) Arm 1 day                      Imaging: No pertinent imaging reviewed  Recent Cultures (last 7 days):         Last 24 Hours Medication List:   Current Facility-Administered Medications   Medication Dose Route Frequency Provider Last Rate    acetaminophen  650 mg Oral Q6H PRN Joanne Jason MD      aluminum-magnesium hydroxide-simethicone  30 mL Oral Q6H PRN Joanne Jason MD      citalopram  20 mg Oral Daily Joanne Jason MD      clopidogrel  75 mg Oral Daily Joanne Jason MD      donepezil  10 mg Oral HS Joanne Jason MD      enoxaparin  40 mg Subcutaneous Daily Joanne Jason MD      gabapentin  100 mg Oral BID Joanne Jason MD      metoprolol tartrate  25 mg Oral Daily Dustin Otoole PA-C      ondansetron  4 mg Intravenous Q6H PRN Joanne Jason MD      polyethylene glycol  17 g Oral Daily PRN Joanne Jason MD      predniSONE  10 mg Oral Daily Joanne Jason MD      QUEtiapine  100 mg Oral HS Joanne Jason MD          Today, Patient Was Seen By: Megan Segal PA-C    **Please Note: This note may have been constructed using a voice recognition system  **

## 2022-09-10 NOTE — PLAN OF CARE
Problem: INFECTION - ADULT  Goal: Absence or prevention of progression during hospitalization  Description: INTERVENTIONS:  - Assess and monitor for signs and symptoms of infection  - Monitor lab/diagnostic results  - Monitor all insertion sites, i e  indwelling lines, tubes, and drains  - Monitor endotracheal if appropriate and nasal secretions for changes in amount and color  - [place] appropriate cooling/warming therapies per order  - Administer medications as ordered  - Instruct and encourage patient and family to use good hand hygiene technique  - Identify and instruct in appropriate isolation precautions for identified infection/condition  Outcome: Progressing  Goal: Absence of fever/infection during neutropenic period  Description: INTERVENTIONS:  - Monitor WBC    Outcome: Progressing     Problem: SAFETY ADULT  Goal: Patient will remain free of falls  Description: INTERVENTIONS:  - Educate patient/family on patient safety including physical limitations  - Instruct patient to call for assistance with activity   - Consult OT/PT to assist with strengthening/mobility   - Keep Call bell within reach  - Keep bed low and locked with side rails adjusted as appropriate  - Keep care items and personal belongings within reach  - Initiate and maintain comfort rounds  - Make Fall Risk Sign visible to staff  - Offer Toileting every  Hours, in advance of need  - Initiate/Maintain alarm  - Obtain necessary fall risk management equipment:   - Apply yellow socks and bracelet for high fall risk patients  - Consider moving patient to room near nurses station  Outcome: Progressing  Goal: Maintain or return to baseline ADL function  Description: INTERVENTIONS:  -  Assess patient's ability to carry out ADLs; assess patient's baseline for ADL function and identify physical deficits which impact ability to perform ADLs (bathing, care of mouth/teeth, toileting, grooming, dressing, etc )  - Assess/evaluate cause of self-care deficits   - Assess range of motion  - Assess patient's mobility; develop plan if impaired  - Assess patient's need for assistive devices and provide as appropriate  - Encourage maximum independence but intervene and supervise when necessary  - Involve family in performance of ADLs  - Assess for home care needs following discharge   - Consider OT consult to assist with ADL evaluation and planning for discharge  - Provide patient education as appropriate  Outcome: Progressing  Goal: Maintains/Returns to pre admission functional level  Description: INTERVENTIONS:  - Perform BMAT or MOVE assessment daily    - Set and communicate daily mobility goal to care team and patient/family/caregiver  - Collaborate with rehabilitation services on mobility goals if consulted  - Perform Range of Motion 2 times a day  - Reposition patient every 2 hours    - Dangle patient 2 times a day  - Stand patient 2 times a day  - Ambulate patient 2 times a day  - Out of bed to chair 2 times a day   - Out of bed for meals 2 times a day  - Out of bed for toileting  - Record patient progress and toleration of activity level   Outcome: Progressing

## 2022-09-10 NOTE — PHYSICAL THERAPY NOTE
PHYSICAL THERAPY EVALUATION  NAME: Bettina Lombardoite  AGE:   80 y o  MRN:  [id number]  ADMIT DX: Dementia ([facility] [address] ) [F03 90]  Failure to thrive in adult [R62 7]    PMH:   Past Medical History:   Diagnosis Date    Anxiety     Benign prostate hyperplasia     Dementia ([facility] )     Neuropathy     RA (rheumatoid arthritis) ([facility] )      LENGTH OF STAY: 1       09/10/22 1355   PT Last Visit   PT Visit Date 09/10/22   Note Type   Note type Evaluation   Pain Assessment   Pain Assessment Tool 0-10   Pain Score No Pain   Restrictions/Precautions   Weight Bearing Precautions Per Order No   Other Precautions Cognitive; Chair Alarm; Bed Alarm; Impulsive; Fall Risk;Hard of hearing  ([place], catheter)   Home Living   Type of Home Assisted living  [facility])   Additional Comments Pt reports he ambulates independently at [facility] without AD  Pt is a questionable historian  Prior Function   Lives With Facility staff   ADL Assistance Needs assistance   IADLs Needs assistance   General   Family/Caregiver Present No   Cognition   Overall Cognitive Status Impaired   Arousal/Participation Cooperative   Orientation Level Oriented to person;Disoriented to place; Disoriented to time;Disoriented to situation   Memory Decreased long term memory;Decreased short term memory;Decreased recall of recent events;Decreased recall of precautions   Following Commands Follows one step commands with increased time or repetition   Comments Pt identified by name and   Subjective   Subjective Agrees to PT evaluation and is cooperative with encouragement  (Upon approach, pt had blankets pulled up over his head and room dark )   RLE Assessment   RLE Assessment X   Strength RLE   RLE Overall Strength 4-/5  (functionally)   LLE Assessment   LLE Assessment X   Strength LLE   LLE Overall Strength 4-/5  (functionally)   Bed Mobility   Supine to Sit 5  Supervision   Additional items HOB elevated; Bedrails; Increased time required;Verbal cues;LE management   Sit to Supine 4  Minimal assistance   Additional items Assist x 1; Increased time required;Verbal cues;LE management   Transfers   Sit to Stand 4  Minimal assistance   Additional items Assist x 1; Increased time required;Verbal cues; Impulsive  (hand placement)   Stand to Sit 4  Minimal assistance   Additional items Assist x 1; Increased time required;Verbal cues; Impulsive   Ambulation/Elevation   Gait pattern Forward Flexion;Decreased foot clearance; Short stride; Wide JEFRY  (impulsive; quick during turns especially)   Gait Assistance 4  Minimal assist   Additional items Assist x 1;Verbal cues   Assistive Device Rolling walker   Distance 25` x1   Balance   Static Sitting Fair +   Static Standing Fair -   Dynamic Standing Fair -   Ambulatory Poor +   Endurance Deficit   Endurance Deficit Yes   Endurance Deficit Description limited ambulation distance, fatigue, gait degradation   Activity Tolerance   Activity Tolerance Patient limited by fatigue   Nurse Made Aware Per RN, pt appropriate to evaluate   Assessment   Prognosis Fair   Problem List Decreased strength;Decreased endurance; Impaired balance;Decreased mobility; Decreased cognition; Impaired judgement;Decreased safety awareness   Assessment Pt is a 80 y o  male seen for PT evaluation s/p admit to [address] [place] [address] on 8/18/2022 w/ UTI (urinary tract infection)  Order placed for PT  Comorbidities affecting pt's physical performance at time of assessment include: HTN and limited cognition  Personal factors affecting pt at time of IE include: advanced age  Prior to admission, pt was living at [facility]  Per chart, facility reports they can no longer take care of the pt  At baseline, the pt reports he is able to ambulate independently without AD, however pt is a questionable historian due to h/o dementia  Upon evaluation: Pt requires supervision for bed mobility, min A for sit to stand, and min A for ambulation with RW     (Please find full objective findings from PT assessment regarding body systems outlined above)  Impairments and limitations also listed above, especially due to  weakness, impaired balance, decreased endurance, gait deviations, decreased activity tolerance, decreased safety awareness, impaired judgement, fall risk and decreased cognition  Pt's clinical presentation is currently unstable/unpredictable seen in pt's presentation of impulsivity, decreased safety awareness, fall risk, and limited insight into deficits  Pt to benefit from continued skilled PT tx while in hospital and upon DC to address deficits as defined above and maximize level of functional mobility  From PT/mobility standpoint, recommendation at time of d/c would be inpatient rehab vs  return to facility with PT services pending level of assist available and true prior level of function  Recommend progression of ambulation and initiation of HEP as appropriate  Goals   Patient Goals to go back to sleep   STG Expiration Date 09/20/22   Short Term Goal #1 Pt will be able to: (1) perform bed mobility with mod I to promote OOB activity (2) perform sit to stand with supervision to decrease burden of care (3) ambulate at least 200` with supervision and least restrictive AD to increase activity tolerance (4) increase standing balance by 1 grade to decrease risk of falls   PT Treatment Day 0   Plan   Treatment/Interventions Functional transfer training;LE strengthening/ROM; Therapeutic exercise; Endurance training;Cognitive reorientation;Equipment eval/education;Patient/family training;Bed mobility;Gait training   PT Frequency 3-5x/wk   Recommendation   PT Discharge Recommendation (S)  Post acute rehabilitation services  (vs  return to facility with PT services pending level of assist available and true prior level of function)   Equipment Recommended Donald walker   AM-PAC Basic Mobility Inpatient   Turning in Bed Without Bedrails 4   Lying on Back to Sitting on Edge of Flat Bed 3   Moving Bed to Chair 3   Standing Up From Chair 3   Walk in Room 3   Climb 3-5 Stairs 1   Basic Mobility Inpatient Raw Score 17   Basic Mobility Standardized Score 39 67   Highest Level Of Mobility   -HL Goal 5: Stand one or more mins   -HLM Achieved 6: Walk 10 steps or more   End of Consult   Patient Position at End of Consult Supine;Bed/Chair alarm activated; All needs within reach     The patient's -[facility] Basic Mobility Inpatient Short Form Raw Score is 17, Standardized Score is 39 67  A Raw Score of greater than or equal to 16 suggests the patient may benefit from discharge to home, which MAY or MAY NOT coincide with CURRENT above PT recommendations  However please refer to therapist recommendation for discharge planning given other factors that may influence destination  Adapted from [employer] [facility] 6-Clicks Basic Mobility and Daily Activity Scores With Discharge Destination  Physical Therapy, 2021;101:1-9   DOI: [email or web address]      Lauren Mcknight, PT,DPT

## 2022-09-11 PROCEDURE — 99232 SBSQ HOSP IP/OBS MODERATE 35: CPT | Performed by: PHYSICIAN ASSISTANT

## 2022-09-11 RX ADMIN — CLOPIDOGREL BISULFATE 75 MG: 75 TABLET ORAL at 09:50

## 2022-09-11 RX ADMIN — METOPROLOL TARTRATE 25 MG: 25 TABLET, FILM COATED ORAL at 21:59

## 2022-09-11 RX ADMIN — GABAPENTIN 100 MG: 100 CAPSULE ORAL at 09:50

## 2022-09-11 RX ADMIN — PREDNISONE 10 MG: 10 TABLET ORAL at 09:50

## 2022-09-11 RX ADMIN — QUETIAPINE FUMARATE 100 MG: 100 TABLET ORAL at 21:59

## 2022-09-11 RX ADMIN — CITALOPRAM HYDROBROMIDE 20 MG: 20 TABLET ORAL at 09:50

## 2022-09-11 RX ADMIN — METOPROLOL TARTRATE 25 MG: 25 TABLET, FILM COATED ORAL at 09:50

## 2022-09-11 RX ADMIN — DONEPEZIL HYDROCHLORIDE 10 MG: 5 TABLET ORAL at 21:59

## 2022-09-11 RX ADMIN — GABAPENTIN 100 MG: 100 CAPSULE ORAL at 17:36

## 2022-09-11 RX ADMIN — ENOXAPARIN SODIUM 40 MG: 40 INJECTION SUBCUTANEOUS at 09:50

## 2022-09-11 NOTE — PLAN OF CARE
Problem: INFECTION - ADULT  Goal: Absence or prevention of progression during hospitalization  Description: INTERVENTIONS:  - Assess and monitor for signs and symptoms of infection  - Monitor lab/diagnostic results  - Monitor all insertion sites, i e  indwelling lines, tubes, and drains  - Monitor endotracheal if appropriate and nasal secretions for changes in amount and color  - [place] appropriate cooling/warming therapies per order  - Administer medications as ordered  - Instruct and encourage patient and family to use good hand hygiene technique  - Identify and instruct in appropriate isolation precautions for identified infection/condition  Outcome: Progressing     Problem: Knowledge Deficit  Goal: Patient/family/caregiver demonstrates understanding of disease process, treatment plan, medications, and discharge instructions  Description: Complete learning assessment and assess knowledge base    Interventions:  - Provide teaching at level of understanding  - Provide teaching via preferred learning methods  Outcome: Progressing     Problem: Prexisting or High Potential for Compromised Skin Integrity  Goal: Skin integrity is maintained or improved  Description: INTERVENTIONS:  - Identify patients at risk for skin breakdown  - Assess and monitor skin integrity  - Assess and monitor nutrition and hydration status  - Monitor labs   - Assess for incontinence   - Turn and reposition patient  - Assist with mobility/ambulation  - Relieve pressure over bony prominences  - Avoid friction and shearing  - Provide appropriate hygiene as needed including keeping skin clean and dry  - Evaluate need for skin moisturizer/barrier cream  - Collaborate with interdisciplinary team   - Patient/family teaching  - Consider wound care consult   Outcome: Progressing     Problem: MOBILITY - ADULT  Goal: Maintain or return to baseline ADL function  Description: INTERVENTIONS:  -  Assess patient's ability to carry out ADLs; assess patient's baseline for ADL function and identify physical deficits which impact ability to perform ADLs (bathing, care of mouth/teeth, toileting, grooming, dressing, etc )  - Assess/evaluate cause of self-care deficits   - Assess range of motion  - Assess patient's mobility; develop plan if impaired  - Assess patient's need for assistive devices and provide as appropriate  - Encourage maximum independence but intervene and supervise when necessary  - Involve family in performance of ADLs  - Assess for home care needs following discharge   - Consider OT consult to assist with ADL evaluation and planning for discharge  - Provide patient education as appropriate  Outcome: Progressing

## 2022-09-11 NOTE — CASE MANAGEMENT
Case Management Assessment & Discharge Planning Note    Patient name Cassidy Borden  Location W [address]W [address] 939-58 MRN [id number]  : 1931 Date 2022       Current Admission Date: 2022  Current Admission Diagnosis:Late onset Alzheimer's disease with behavioral disturbance [facility])   Patient Active Problem List    Diagnosis Date Noted    Pressure injury of sacral region, stage 2 ([facility] ) 2022    Buttock wound 2022    Positive blood cultures 2022    UTI (urinary tract infection) 2022    Primary hypertension 10/24/2021    [facility]VD [facility]peripheral vascular disease) [facility] 10/24/2021    Late onset Alzheimer's disease with behavioral disturbance ([place] [address] ) 2021    Benign prostatic hyperplasia with post-void dribbling 2021    Rheumatoid arthritis with positive rheumatoid factor ([facility] ) 2021    Agitation due to dementia ([facility] ) 2021      LOS (days): 2  Geometric Mean LOS (GMLOS) (days): 4 50  Days to GMLOS:2 5     OBJECTIVE:  PATIENT READMITTED TO HOSPITAL  Risk of Unplanned Readmission Score: 21 85         Current admission status: Inpatient       Preferred Pharmacy:   [address] [address]  Phone: [phone] Fax: [phone]    [facility]/pharmacy #[id number]- [place] [address] [address]  Phone: [phone] Fax: [phone]    Primary Care Provider: No primary care provider on file      [facility]  Secondary Insurance:     ASSESSMENT:  [place] [address] Representative - Daughter   Primary Phone: [phone] (Home)                         Readmission Root Cause  30 Day Readmission: Yes  Who directed you to return to the hospital?: Other (comment) (Facility)  Did you understand whom to contact if you had questions or problems?: Yes  Did you get your prescriptions before you left the hospital?: Yes  Were you able to get your prescriptions filled when you left the hospital?: Yes  Did you take your medications as prescribed?: Yes  Were you able to get to your follow-up appointments?: No  Reason[de-identified] Readmitted prior to appointment  During previous admission, was a post-acute recommendation made?: Yes  What post-acute resources were offered?: STR  Patient was readmitted due to: Need PT rehab  Action Plan: PT/OT eval; STR    Patient Information  Admitted from[de-identified] Facility  Mental Status: Confused  During Assessment patient was accompanied by: Not accompanied during assessment  Assessment information provided by[de-identified] Daughter  Primary Caregiver: Other (Comment) (Facility)  Caregiver's Name[de-identified] Salma Mclaughlin III  Caregiver's Relationship to Patient[de-identified] Other ([facility]) (Facility)  Caregiver's Telephone Number[de-identified] [phone]  Support Systems: Daughter (Tatum Haynes)  [place] of Residence: [address] [facility] do you live in?: [address] [place] entry access options   Select all that apply : No steps to enter home  Type of Current Residence: Facility    Activities of Daily Living Prior to Admission  Functional Status: Assistance  Completes ADLs independently?: No  Level of ADL dependence: Assistance  Ambulates independently?: No  Level of ambulatory dependence: Assistance  Does patient use assisted devices?: Yes  Assisted Devices (DME) used: Aileen Jay  Does patient currently own DME?: Yes  What DME does the patient currently own?: Aileen Jay  Does patient have a history of Outpatient Therapy (PT/OT)?: No  Does the patient have a history of Short-Term Rehab?: Yes Eloise Stewart  Does patient have a history of HHC?: Yes  Does patient currently have [address]?: Yes    Current Home Health Care  Type of Current Home Care Services: Home PT, Home OT, Nurse visit  [address][de-identified] Other (please enter name in comment)  [address] Provider[de-identified] PCP    Patient Information Continued  Income Source: Pension/senior living         Means of Transportation  Means of Transport to [facility][de-identified] Family transport        DISCHARGE DETAILS:    Discharge planning discussed with[de-identified] Daughter Celine Baldwin of Choice: Yes  Comments - Freedom of Choice: Feliciano Messi wishes for a blanket referral for her dad to go to short term rehabilitation  Feliciano Swenson does not wish for her father to go to [facility], he was there previously and discharged after a week and they feel he needed more therapy before returning to [facility]  CM contacted family/caregiver?: Yes  Were Treatment Team discharge recommendations reviewed with patient/caregiver?: Yes  Did patient/caregiver verbalize understanding of patient care needs?: N/A- going to facility  Were patient/caregiver advised of the risks associated with not following Treatment Team discharge recommendations?: Yes    Contacts  Patient Contacts: Feliciano Swenson  Relationship to Patient[de-identified] Family  Contact Method: Phone  Phone Number: [phone]  Reason/Outcome: Continuity of Care, Emergency Contact, Discharge Planning              Other Referral/Resources/Interventions Provided:  Interventions: Short Term Rehab  Referral Comments: CM placed a referral via [address] for [address] [place] [address]  CM will follow-up with jessica  Treatment Team Recommendation: Short Term Rehab  Discharge Destination Plan[de-identified] Short Term Rehab                                                            Feliciano Swenson states pt is COVID vaccinated and BOOSTED  Feliciano Swenson states she is willing for her dad to be boosted prior to discharge with the new booster that is available

## 2022-09-11 NOTE — PROGRESS NOTES
[facility]  Progress Note - Kvngpoonam Paulsonlia 9/7/1931, 80 y o  male MRN: [id number]  Unit/Bed#: W -01 Encounter: [id number]  Primary Care Provider: No primary care provider on file  Date and time admitted to hospital: 9/8/2022  3:17 PM    * Late onset Alzheimer's disease with behavioral disturbance ([facility] )  Assessment & Plan  · Patient transferred from current facility for questionable dementia with agitated features  · Patient has no idea why he is here, unable to give any pertinent history  · Per discussion with ER his current facility is unable to care for him however no one at that facility could answer the phone for conversation   · Per patient's daughter facility told her he had an abscess in his buttocks which required evaluation  I am only revealing a stage II decubitus as below  · PT/OT  · CM for likely LTC    Pressure injury of sacral region, stage 2 ([facility] )  Assessment & Plan  · Consult wound care  · Does not appear infected  · Offloading and local wound care    Primary hypertension  Assessment & Plan  · Continue Lopressor 25 mg BID  · Monitor BP    Rheumatoid arthritis with positive rheumatoid factor (HCC)  Assessment & Plan  · On chronic steroids will continue      VTE Pharmacologic Prophylaxis: VTE Score: 4 Moderate Risk (Score 3-4) - Pharmacological DVT Prophylaxis Ordered: enoxaparin (Lovenox)  Patient Centered Rounds: I performed bedside rounds with nursing staff today  Discussions with Specialists or Other Care Team Provider: rn    Education and Discussions with Family / Patient: Attempted to update  (daughter) via phone  Left voicemail  Time Spent for Care: 30 minutes  More than 50% of total time spent on counseling and coordination of care as described above      Current Length of Stay: 2 day(s)  Current Patient Status: Inpatient   Certification Statement: The patient will continue to require additional inpatient hospital stay due to await placement  Discharge Plan: Anticipate discharge in 48-72 hrs to rehab facility  Code Status: Level 1 - Full Code    Subjective:   Patient voices no complaints  No overnight events  HE is still withdrawn  Objective:     Vitals:   Temp (24hrs), Av °F (36 7 °C), Min:96 7 °F (35 9 °C), Max:99 °F (37 2 °C)    Temp:  [96 7 °F (35 9 °C)-99 °F (37 2 °C)] 99 °F (37 2 °C)  HR:  [58-61] 58  Resp:  [16-19] 17  BP: ()/() 170/82  SpO2:  [91 %-95 %] 95 %  There is no height or weight on file to calculate BMI  Input and Output Summary (last 24 hours): Intake/Output Summary (Last 24 hours) at 2022 0806  Last data filed at 2022 0600  Gross per 24 hour   Intake 120 ml   Output 1700 ml   Net -1580 ml       Physical Exam:   Physical Exam  Vitals and nursing note reviewed  Constitutional:       General: He is not in acute distress  Appearance: Normal appearance  HENT:      Head: Normocephalic  Mouth/Throat:      Mouth: Mucous membranes are moist    Eyes:      Pupils: Pupils are equal, round, and reactive to light  Cardiovascular:      Rate and Rhythm: Normal rate and regular rhythm  Heart sounds: No murmur heard  Pulmonary:      Effort: Pulmonary effort is normal  No respiratory distress  Breath sounds: Normal breath sounds  No wheezing, rhonchi or rales  Abdominal:      General: Bowel sounds are normal  There is no distension  Palpations: Abdomen is soft  Tenderness: There is no abdominal tenderness  There is no guarding  Musculoskeletal:         General: No deformity  Cervical back: Normal range of motion  Right lower leg: No edema  Left lower leg: No edema  Skin:     Capillary Refill: Capillary refill takes less than 2 seconds  Neurological:      General: No focal deficit present  Mental Status: He is alert  Mental status is at baseline  He is disoriented            Additional Data:     Labs:  Results from last 7 days   Lab Units 09/08/22  1632   WBC Thousand/uL 7 02   HEMOGLOBIN g/dL 12 1   HEMATOCRIT % 37 9   PLATELETS Thousands/uL 177   NEUTROS PCT % 70   LYMPHS PCT % 20   MONOS PCT % 8   EOS PCT % 1     Results from last 7 days   Lab Units 09/08/22  1632   SODIUM mmol/L 142   POTASSIUM mmol/L 5 0   CHLORIDE mmol/L 109*   CO2 mmol/L 27   BUN mg/dL 39*   CREATININE mg/dL 1 72*   ANION GAP mmol/L 6   CALCIUM mg/dL 8 5   ALBUMIN g/dL 3 5   TOTAL BILIRUBIN mg/dL 0 38   ALK PHOS U/L 70   ALT U/L 5*   AST U/L 18   GLUCOSE RANDOM mg/dL 114                       Lines/Drains:  Invasive Devices  Report    Peripheral Intravenous Line  Duration           Peripheral IV 09/08/22 Distal;Right;Upper;Ventral (anterior) Arm 2 days                      Imaging: No pertinent imaging reviewed  Recent Cultures (last 7 days):         Last 24 Hours Medication List:   Current Facility-Administered Medications   Medication Dose Route Frequency Provider Last Rate    acetaminophen  650 mg Oral Q6H PRN Chasity Jackson MD      aluminum-magnesium hydroxide-simethicone  30 mL Oral Q6H PRN Chasity Jackson MD      citalopram  20 mg Oral Daily Chasity Jackson MD      clopidogrel  75 mg Oral Daily Chasity Jackson MD      donepezil  10 mg Oral HS Chasity Jackson MD      enoxaparin  40 mg Subcutaneous Daily Chasity Jackson MD      gabapentin  100 mg Oral BID Chasity Jackson MD      metoprolol tartrate  25 mg Oral Q12H [facility] Finesse Juares PA-C      ondansetron  4 mg Intravenous Q6H PRN Chasity Jackson MD      polyethylene glycol  17 g Oral Daily PRN Chasity Jackson MD      predniSONE  10 mg Oral Daily Chasity Jackson MD      QUEtiapine  100 mg Oral HS Chasity Jackson MD          Today, Patient Was Seen By: Rhina Patton PA-C    **Please Note: This note may have been constructed using a voice recognition system  **

## 2022-09-11 NOTE — CASE MANAGEMENT
Case Management Progress Note    Patient name Maggie Nolasco  Location W [address]W [address] 619-55 MRN [id number]  : 1931 Date 2022       LOS (days): 2  Geometric Mean LOS (GMLOS) (days): 4 50  Days to GMLOS:2 5        OBJECTIVE:        Current admission status: Inpatient  Preferred Pharmacy:   [address], [address] [place] [address]  Phone: [phone] Fax: [phone]    [id number], [address]  [place]  [address]  [facility] [address]  Phone: [phone] Fax: [phone]    Primary Care Provider: No primary care provider on file  Primary Insurance: [facility]  Secondary Insurance:     PROGRESS NOTE:      CM M for a return call to daughter's [place]  CM will follow-up with a return call

## 2022-09-11 NOTE — CASE MANAGEMENT
Case Management Progress Note    Patient name Cassidy Borden  Location W [address]W [address] 843-96 MRN [id number]  : 1931 Date 2022       LOS (days): 2  Geometric Mean LOS (GMLOS) (days): 4 50  Days to GMLOS:2 5        OBJECTIVE:        Current admission status: Inpatient  Preferred Pharmacy:   [place] [address]  Phone: [phone] Fax: [phone]    [id number] [place], [address]  [place]  [address]  [facility] [place] [address]  Phone: [phone] Fax: [phone]    Primary Care Provider: No primary care provider on file  Primary Insurance: [facility]  Secondary Insurance:     PROGRESS NOTE:    JORGE called [facility] to discuss pt's return  JORGE spoke with the RN who stated pt needs to go to rehab, as they do not have the services to be able to care for him at this time  RN states pt has been in his bed most days, and not ambulating much  RN states he needs to be functionally able to care for himself and rehab is necessary at this time

## 2022-09-12 LAB
MRSA NOSE QL CULT: ABNORMAL
MRSA NOSE QL CULT: ABNORMAL

## 2022-09-12 PROCEDURE — 99232 SBSQ HOSP IP/OBS MODERATE 35: CPT | Performed by: PHYSICIAN ASSISTANT

## 2022-09-12 PROCEDURE — 97167 OT EVAL HIGH COMPLEX 60 MIN: CPT

## 2022-09-12 PROCEDURE — 97116 GAIT TRAINING THERAPY: CPT

## 2022-09-12 PROCEDURE — 97530 THERAPEUTIC ACTIVITIES: CPT

## 2022-09-12 RX ORDER — AMLODIPINE BESYLATE 5 MG/1
5 TABLET ORAL DAILY
Status: DISCONTINUED | OUTPATIENT
Start: 2022-09-13 | End: 2022-09-21 | Stop reason: HOSPADM

## 2022-09-12 RX ORDER — OLANZAPINE 5 MG/1
5 TABLET, ORALLY DISINTEGRATING ORAL
Status: DISCONTINUED | OUTPATIENT
Start: 2022-09-12 | End: 2022-09-21 | Stop reason: HOSPADM

## 2022-09-12 RX ADMIN — DONEPEZIL HYDROCHLORIDE 10 MG: 5 TABLET ORAL at 21:51

## 2022-09-12 RX ADMIN — GABAPENTIN 100 MG: 100 CAPSULE ORAL at 08:35

## 2022-09-12 RX ADMIN — QUETIAPINE FUMARATE 100 MG: 100 TABLET ORAL at 21:51

## 2022-09-12 RX ADMIN — PREDNISONE 10 MG: 10 TABLET ORAL at 08:35

## 2022-09-12 RX ADMIN — GABAPENTIN 100 MG: 100 CAPSULE ORAL at 17:24

## 2022-09-12 RX ADMIN — CLOPIDOGREL BISULFATE 75 MG: 75 TABLET ORAL at 08:35

## 2022-09-12 RX ADMIN — ENOXAPARIN SODIUM 40 MG: 40 INJECTION SUBCUTANEOUS at 08:35

## 2022-09-12 RX ADMIN — CITALOPRAM HYDROBROMIDE 20 MG: 20 TABLET ORAL at 08:35

## 2022-09-12 NOTE — UTILIZATION REVIEW
Continued Stay Review    Date: 9/10/2022   And 9/11                      Current Patient Class: IP  [facility]     HPI:89 y o  male initially admitted on 9/8 to OBS with ALZHEIMER'S DISEASE with BEHAVIORAL DISTURBANCE and SACRAL WOUND and converted to IP 9/9 for continued tx of dementia with agitated features and need for safe D/C plan  Per CM note pt is unable to [facility] to [facility] @ this time  Assessment/Plan: 9/10:  Decreased Lopressor to 25 mg daily (was 50 mg) due to bradycardia  Monitor BP  Stage 2 sacral decub - offloading and local wound care  PT recommending post acute rehab services   left [facility] for dtrs  Re: dc planning    9/11:Pt increasingly agitated, aggressive with staff and difficult to redirect; getting oob w/o calling for assistance  Bilat wrist restraints applied  Changed metoprolol to 25 mg po q 12h  Referrals made by CM for STR      Vital Signs:   09/12/22 0835 -- 44 Abnormal  -- 139/57 -- -- -- --   09/12/22 07:11:59 97 8 °F (36 6 °C) -- 18 159/70 100 -- -- --   09/11/22 2300 -- -- -- -- -- 93 % None (Room air) --   09/11/22 20:41:27 -- 77 18 152/62 92 84 % Abnormal  -- --   09/11/22 18:24:18 -- 62 -- 152/66 95 97 % -- --   09/11/22 1748 -- -- -- 152/90 -- -- -- Lying   09/11/22 17:21:31 97 7 °F (36 5 °C) 63 18 210/178 Abnormal  189 97 % None (Room air) Lying   09/10/22 2138 -- -- -- 170/82 -- -- -- Lying   09/10/22 21:22:05 99 °F (37 2 °C) 58 17 210/178 Abnormal  189 95 % None (Room air) Lying   09/10/22 1700 97 9 °F (36 6 °C) -- -- -- -- -- -- --   09/10/22 1500 96 7 °F (35 9 °C)  61 16 90/70 77 91 % None (Room air) Lying   09/10/22 1300 -- -- -- -- -- -- None (Room air) --   09/10/22 1205 -- -- -- 152/78 -- -- -- Lying   09/10/22 0825 98 4 °F (36 9 °C) 58 19 172/82 Abnormal  -- 95 % -- --       Pertinent Labs/Diagnostic Results:   Results from last 7 days   Lab Units 09/08/22  1632   SARS-COV-2  Negative     Results from last 7 days   Lab Units 09/08/22  1632   WBC Thousand/uL 7 02   HEMOGLOBIN g/dL 12 1   HEMATOCRIT % 37 9   PLATELETS Thousands/uL 177   NEUTROS ABS Thousands/µL 4 88     Results from last 7 days   Lab Units 09/08/22  1632   SODIUM mmol/L 142   POTASSIUM mmol/L 5 0   CHLORIDE mmol/L 109*   CO2 mmol/L 27   ANION GAP mmol/L 6   BUN mg/dL 39*   CREATININE mg/dL 1 72*   EGFR ml/min/1 73sq m 34   CALCIUM mg/dL 8 5     Results from last 7 days   Lab Units 09/08/22  1632   AST U/L 18   ALT U/L 5*   ALK PHOS U/L 70   TOTAL PROTEIN g/dL 5 9*   ALBUMIN g/dL 3 5   TOTAL BILIRUBIN mg/dL 0 38     Results from last 7 days   Lab Units 09/08/22  1632   GLUCOSE RANDOM mg/dL 114       Results from last 7 days   Lab Units 09/08/22  1632   TSH 3RD GENERATON uIU/mL 1 343     Results from last 7 days   Lab Units 09/08/22  1632   INFLUENZA A PCR  Negative   INFLUENZA B PCR  Negative   RSV PCR  Negative     Results from last 7 days   Lab Units 09/09/22  1635   AMPH/METH  Negative   BARBITURATE UR  Negative   BENZODIAZEPINE UR  Negative   COCAINE UR  Negative   METHADONE URINE  Negative   OPIATE UR  Negative   PCP UR  Negative   THC UR  Negative       Medications:   Scheduled Medications:  citalopram, 20 mg, Oral, Daily  clopidogrel, 75 mg, Oral, Daily  donepezil, 10 mg, Oral, HS  enoxaparin, 40 mg, Subcutaneous, Daily  gabapentin, 100 mg, Oral, BID  metoprolol tartrate, 25 mg, Oral, Q12H ABDIFATAH  predniSONE, 10 mg, Oral, Daily  QUEtiapine, 100 mg, Oral, HS    Continuous IV Infusions:     PRN Meds:  acetaminophen, 650 mg, Oral, Q6H PRN  aluminum-magnesium hydroxide-simethicone, 30 mL, Oral, Q6H PRN  OLANZapine, 5 mg, Oral, Q3H PRN  ondansetron, 4 mg, Intravenous, Q6H PRN  polyethylene glycol, 17 g, Oral, Daily PRN    Discharge Plan: TBD    Network Utilization Review Department  ATTENTION: Please call with any questions or concerns to [phone] and carefully listen to the prompts so that you are directed to the right person   All voicemails are confidential   Sudie Crigler all requests for admission clinical reviews, approved or denied determinations and any other requests to dedicated fax number below belonging to the campus where the patient is receiving treatment   List of dedicated fax numbers for the Facilities:  [address] [place] [address] DENIALS (Administrative/Medical Necessity) [phone]   [address]  [facility] (Maternity/NICU/Pediatrics) [phone] [address]  [address] [facility] [place] [address] [address] [address] Janak Pope 14[address] [facility] [place] [address] [phone]

## 2022-09-12 NOTE — OCCUPATIONAL THERAPY NOTE
Occupational Therapy Evaluation      Sergio Lauren    9/12/2022    Patient Active Problem List   Diagnosis    Late onset Alzheimer's disease with behavioral disturbance ([place] [address] )    Benign prostatic hyperplasia with post-void dribbling    Rheumatoid arthritis with positive rheumatoid factor (HCC)    Agitation due to dementia ([facility] )    Primary hypertension    PVD (peripheral vascular disease) ([place] [address] )    UTI (urinary tract infection)    Positive blood cultures    Buttock wound    Pressure injury of sacral region, stage 2 ([place] [address] )       Past Medical History:   Diagnosis Date    Anxiety     Benign prostate hyperplasia     Dementia ([place] [address] )     Neuropathy     RA (rheumatoid arthritis) ([place] [address] )        History reviewed  No pertinent surgical history  09/12/22 0927   OT Last Visit   OT Visit Date 09/12/22   Note Type   Note type Evaluation   Restrictions/Precautions   Weight Bearing Precautions Per Order No   Other Precautions Impulsive; Bed Alarm; Chair Alarm;Cognitive; Fall Risk   Pain Assessment   Pain Assessment Tool 0-10   Pain Score No Pain   Home Living   Type of Home Assisted living  [facility])   Home Layout One level   Additional Comments Pt questionable historian  Per chart review, pt resides at [facility]   Prior Function   Level of [place] Independent with ADLs and functional mobility   Lives With Facility staff   Receives Help From Personal care attendant   ADL Assistance Needs assistance   IADLs Needs assistance   Falls in the last 6 months 0  (per pt)   Vocational Retired   Comments Pt questionable historian  Pt reports he ambulated (I) with no AD baseline and is (I) with ADLs  Pt reports facility brings meals to his room  Per cahrt review pt requires A for ADL completion and uses a RW for mobility     Lifestyle   Autonomy Pt resides at [facility] living [facility] and requires A for ADL completion   Reciprocal Relationships Supportive facility staff and daughter   Service to Others Retired Subjective   Subjective "I need a lot of paper cause I've got a deep hole back there"   ADL   Eating Assistance 6  Modified independent   Eating Deficit Setup; Beverage management  (eating breakfast upon arrival)   Grooming Assistance 5  Supervision/Setup   Grooming Deficit Setup;Brushing hair  (sitting in recliner)   LB Dressing Assistance 4  Minimal Assistance   LB Dressing Deficit Setup;Steadying;Supervision/safety;Verbal cueing; Increased time to complete; Thread RLE into pants; Thread LLE into pants;Pull up over hips; Fasteners  (min A x 1 for balance in stance; able to manage tie in sitting)   Toileting Assistance  3  Moderate Assistance   Toileting Deficit Setup;Steadying;Supervison/safety;Verbal cueing; Increased time to complete;Grab bar use;Clothing management up;Clothing management down;Perineal hygiene  (VC for use of grab bar)   Additional Comments This session, pt grossly incontinent of bowel during mobility trials  Despite VC, pt denies incontinence  Wound care present during toileting to change buttock wound dressing  Bed Mobility   Supine to Sit Unable to assess   Sit to Supine 5  Supervision   Additional items Assist x 1; Increased time required;Verbal cues   Additional Comments Despite encouragement to sit OOB, pt requesting to return to supine due to nausea  Transfers   Sit to Stand 4  Minimal assistance   Additional items Assist x 1; Increased time required;Verbal cues   Stand to Sit 4  Minimal assistance   Additional items Assist x 1; Increased time required;Verbal cues   Toilet transfer 3  Moderate assistance   Additional items Assist x 1; Increased time required;Verbal cues;Standard toilet  (VC for use of grab bar R; x 2 trials)   Additional Comments VC for safe hand placement and technique during funcitonal transfers  Functional Mobility   Functional Mobility 4  Minimal assistance   Additional Comments Short household distance to bathroom and back with RW with Min (A)   Easily fatigued with short distance, required seated rest break  Pt reporting feelings of nausea   Additional items Rolling walker   Balance   Static Sitting Fair +   Dynamic Sitting Fair   Static Standing Fair -   Dynamic Standing Poor +   Activity Tolerance   Activity Tolerance Patient limited by fatigue; Other (Comment)  (limited by cognition)   Medical Staff Made Aware Care coordinated with PT Joann   Nurse Made Aware yes, DEB jimenez to see pt and updated on outcomes   RUE Assessment   RUE Assessment WFL   LUE Assessment   LUE Assessment WFL   Hand Function   Gross Motor Coordination Functional   Fine Motor Coordination Functional   Sensation   Light Touch No apparent deficits   Vision-Basic Assessment   Current Vision Does not wear glasses   Cognition   Overall Cognitive Status Impaired   Arousal/Participation Alert; Cooperative   Attention Attends with cues to redirect   Orientation Level Oriented to person;Disoriented to place; Disoriented to time;Disoriented to situation   Memory Decreased short term memory;Decreased recall of recent events;Decreased recall of precautions   Following Commands Follows one step commands with increased time or repetition   Comments Pt pleasantly confused this session  VC for sequencing of basic ADL tasks  VC for safety throughout session  Despite VC on location, pt unable to recall when asked again at end of session   Assessment   Limitation Decreased ADL status; Decreased Safe judgement during ADL;Decreased cognition;Decreased endurance;Decreased high-level ADLs; Decreased self-care trans   Prognosis Good   Assessment Patient is a 80 y o  male seen for OT evaluation s/p admit to [address] [place] [address] on 9/8/2022 w/Late onset Alzheimer's disease with behavioral disturbance ([facility] )  Comorbidities affecting patient's functional performance at time of assessment include: pressure injury of sacral region, PVD, arthritis and hypertension  Orders received for OT evaluation and treatment   Patient identified during session through name and wristband  PTA, patient was independent with functional mobility with RW, requiring assist for ADLS, requiring assist for IADLS, an assisted living resident and retired  Personal factors affecting patient at time of initial evaluation include: limited insight into deficits, flat affect, decreased initiation and engagement, difficulty performing ADLs and difficulty performing IADLs  Patient is alert, oriented to name,, disoriented to time,, disoriented to place, and disoriented to situation, and presents with impaired judgement, inability to make safe decisions  The evaluation identifies the following performance deficits: weakness, impaired balance, decreased endurance, increased fall risk, new onset of impairment of functional mobility, decreased ADLS, decreased IADLS, decreased activity tolerance, decreased safety awareness, impaired judgement, decreased cognition and decreased strength, that result in activity limitations  Based on the OT evaluation outcomes, functional performance deficits, and assessment findings, pt has been identified as high complexity, because the patient presents with comorbidites that affect occupational performance and required significant modification of tasks or assistance with consideration of multiple treatment options  The patient's raw score on the AM-PAC Daily Activity inpatient short form is 18, standardized score is 38 66, less than 39 4  Patient to benefit from continued Occupational Therapy treatment to address above deficits and maximize level of independence with ADLs and functional mobility  Occupational Performance areas to address include: bathing/ shower, dressing, toilet hygiene, transfer to all surfaces and functional ambulation  From OT standpoint, recommendation at time of d/c would be Post acute rehabilitation services     Goals   Patient Goals to lay down in bed   LTG Time Frame 10-14   Long Term Goal #1 see goals below   Plan Treatment Interventions ADL retraining;Functional transfer training; Endurance training;Cognitive reorientation;Patient/family training;Equipment evaluation/education; Compensatory technique education;Continued evaluation; Energy conservation; Activityengagement   Goal Expiration Date 09/22/22   OT Treatment Day 0   OT Frequency 2-3x/wk   Recommendation   OT Discharge Recommendation Post acute rehabilitation services  (vs Return to [facility] with rehab)   Additional Comments  Pt seen as a co-eval with PT due to the patient's co-morbidities, clinically unstable presentation, and present impairments which are a regression from the patient's baseline  Additional Comments 2 At end of session, pt returned to supine in bed with all needs met and call bell within reach     AM-PAC Daily Activity Inpatient   Lower Body Dressing 3   Bathing 2   Toileting 3   Upper Body Dressing 3   Grooming 3   Eating 4   Daily Activity Raw Score 18   Daily Activity Standardized Score (Calc for Raw Score >=11) 38 66   AM-PAC Applied Cognition Inpatient   Following a Speech/Presentation 2   Understanding Ordinary Conversation 3   Taking Medications 1   Remembering Where Things Are Placed or Put Away 2   Remembering List of 4-5 Errands 1   Taking Care of Complicated Tasks 1   Applied Cognition Raw Score 10   Applied Cognition Standardized Score 24 98     GOALS:    *Goals established to promote patient goal of to lay back down in bed:      *ADL transfers with (S) for inc'd independence with ADLs/purposeful tasks    *LB ADL with (S) using AE prn for inc'd independence with self cares    *Toileting with (S) for clothing management and hygiene for return to PLOF with personal care    *Increase stand tolerance x 3  m for inc'd tolerance with standing purposeful tasks    *Bed mobility- (I) for inc'd independence to manage own comfort and initiate EOB & OOB purposeful tasks    *Patient will verbalize 3 safety awareness/ principles to prevent falls in the home setting  *Patient will verbalize and demonstrate use of energy conservation/deep breathing techniques and work simplification skills during functional activities with no verbal cues  *Patient will increase OOB/sitting tolerance to 2-4 hours per day to increase participation in self-care and leisure tasks with no s/s of exertion  *Patient will engage in ongoing cognitive assessment to assist with safe discharge planning/recommendations  *Patient will increase functional mobility to and from bathroom with rolling walker with supervision to increase performance with ADLS and to use a toilet  *Patient will improve functional activity tolerance to 10 minutes of sustained functional tasks to increase participation in basic self-care and decrease assistance level       Isaiah Hernandez, OTR/L

## 2022-09-12 NOTE — PLAN OF CARE
Problem: OCCUPATIONAL THERAPY ADULT  Goal: Performs self-care activities at highest level of function for planned discharge setting  See evaluation for individualized goals  Description: Treatment Interventions: ADL retraining, Functional transfer training, Endurance training, Cognitive reorientation, Patient/family training, Equipment evaluation/education, Compensatory technique education, Continued evaluation, Energy conservation, Activityengagement          See flowsheet documentation for full assessment, interventions and recommendations  Note: Limitation: Decreased ADL status, Decreased Safe judgement during ADL, Decreased cognition, Decreased endurance, Decreased high-level ADLs, Decreased self-care trans  Prognosis: Good  Assessment: Patient is a 80 y o  male seen for OT evaluation s/p admit to [address] [place] [address] on 9/8/2022 w/Late onset Alzheimer's disease with behavioral disturbance ([facility] )  Comorbidities affecting patient's functional performance at time of assessment include: pressure injury of sacral region, PVD, arthritis and hypertension  Orders received for OT evaluation and treatment  Patient identified during session through name and wristband  PTA, patient was independent with functional mobility with RW, requiring assist for ADLS, requiring assist for IADLS, an assisted living resident and retired  Personal factors affecting patient at time of initial evaluation include: limited insight into deficits, flat affect, decreased initiation and engagement, difficulty performing ADLs and difficulty performing IADLs  Patient is alert, oriented to name,, disoriented to time,, disoriented to place, and disoriented to situation, and presents with impaired judgement, inability to make safe decisions   The evaluation identifies the following performance deficits: weakness, impaired balance, decreased endurance, increased fall risk, new onset of impairment of functional mobility, decreased ADLS, decreased IADLS, decreased activity tolerance, decreased safety awareness, impaired judgement, decreased cognition and decreased strength, that result in activity limitations  Based on the OT evaluation outcomes, functional performance deficits, and assessment findings, pt has been identified as high complexity, because the patient presents with comorbidites that affect occupational performance and required significant modification of tasks or assistance with consideration of multiple treatment options  The patient's raw score on the AM-PAC Daily Activity inpatient short form is 18, standardized score is 38 66, less than 39 4  Patient to benefit from continued Occupational Therapy treatment to address above deficits and maximize level of independence with ADLs and functional mobility  Occupational Performance areas to address include: bathing/ shower, dressing, toilet hygiene, transfer to all surfaces and functional ambulation  From OT standpoint, recommendation at time of d/c would be Post acute rehabilitation services       OT Discharge Recommendation: Post acute rehabilitation services (vs Return to [facility] with rehab)     Blaine Neumann, OT

## 2022-09-12 NOTE — ASSESSMENT & PLAN NOTE
· Patient has been on Lopressor 50 mg daily prior to admission, this was decreased to 25 mg however still with sinus bradycardia  Will discontinue in favor of amlodipine 5 mg daily    · Monitor BP

## 2022-09-12 NOTE — PLAN OF CARE
Problem: INFECTION - ADULT  Goal: Absence or prevention of progression during hospitalization  Description: INTERVENTIONS:  - Assess and monitor for signs and symptoms of infection  - Monitor lab/diagnostic results  - Monitor all insertion sites, i e  indwelling lines, tubes, and drains  - Monitor endotracheal if appropriate and nasal secretions for changes in amount and color  - [place] appropriate cooling/warming therapies per order  - Administer medications as ordered  - Instruct and encourage patient and family to use good hand hygiene technique  - Identify and instruct in appropriate isolation precautions for identified infection/condition  Outcome: Progressing  Goal: Absence of fever/infection during neutropenic period  Description: INTERVENTIONS:  - Monitor WBC    Outcome: Progressing     Problem: SAFETY ADULT  Goal: Patient will remain free of falls  Description: INTERVENTIONS:  - Educate patient/family on patient safety including physical limitations  - Instruct patient to call for assistance with activity   - Consult OT/PT to assist with strengthening/mobility   - Keep Call bell within reach  - Keep bed low and locked with side rails adjusted as appropriate  - Keep care items and personal belongings within reach  - Initiate and maintain comfort rounds  - Make Fall Risk Sign visible to staff  - Offer Toileting every 2 Hours, in advance of need  - Initiate/Maintain bed alarm  - Obtain necessary fall risk management equipment:   - Apply yellow socks and bracelet for high fall risk patients  - Consider moving patient to room near nurses station  Outcome: Progressing  Goal: Maintain or return to baseline ADL function  Description: INTERVENTIONS:  -  Assess patient's ability to carry out ADLs; assess patient's baseline for ADL function and identify physical deficits which impact ability to perform ADLs (bathing, care of mouth/teeth, toileting, grooming, dressing, etc )  - Assess/evaluate cause of self-care deficits   - Assess range of motion  - Assess patient's mobility; develop plan if impaired  - Assess patient's need for assistive devices and provide as appropriate  - Encourage maximum independence but intervene and supervise when necessary  - Involve family in performance of ADLs  - Assess for home care needs following discharge   - Consider OT consult to assist with ADL evaluation and planning for discharge  - Provide patient education as appropriate  Outcome: Progressing  Goal: Maintains/Returns to pre admission functional level  Description: INTERVENTIONS:  - Perform BMAT or MOVE assessment daily    - Set and communicate daily mobility goal to care team and patient/family/caregiver  - Collaborate with rehabilitation services on mobility goals if consulted  - Perform Range of Motion  - Reposition patient every 2 hours    - Dangle patient 4 times a day  - Stand patient 4 times a day  - Ambulate patient 4 times a day  - Out of bed to chair 4 times a day   - Out of bed for meals 3 times a day  - Out of bed for toileting  - Record patient progress and toleration of activity level   Outcome: Progressing     Problem: DISCHARGE PLANNING  Goal: Discharge to home or other facility with appropriate resources  Description: INTERVENTIONS:  - Identify barriers to discharge w/patient and caregiver  - Arrange for needed discharge resources and transportation as appropriate  - Identify discharge learning needs (meds, wound care, etc )  - Arrange for interpretive services to assist at discharge as needed  - Refer to Case Management Department for coordinating discharge planning if the patient needs post-hospital services based on physician/advanced practitioner order or complex needs related to functional status, cognitive ability, or social support system  Outcome: Progressing     Problem: Knowledge Deficit  Goal: Patient/family/caregiver demonstrates understanding of disease process, treatment plan, medications, and discharge instructions  Description: Complete learning assessment and assess knowledge base    Interventions:  - Provide teaching at level of understanding  - Provide teaching via preferred learning methods  Outcome: Progressing     Problem: Prexisting or High Potential for Compromised Skin Integrity  Goal: Skin integrity is maintained or improved  Description: INTERVENTIONS:  - Identify patients at risk for skin breakdown  - Assess and monitor skin integrity  - Assess and monitor nutrition and hydration status  - Monitor labs   - Assess for incontinence   - Turn and reposition patient  - Assist with mobility/ambulation  - Relieve pressure over bony prominences  - Avoid friction and shearing  - Provide appropriate hygiene as needed including keeping skin clean and dry  - Evaluate need for skin moisturizer/barrier cream  - Collaborate with interdisciplinary team   - Patient/family teaching  - Consider wound care consult   Outcome: Progressing     Problem: Potential for Falls  Goal: Patient will remain free of falls  Description: INTERVENTIONS:  - Educate patient/family on patient safety including physical limitations  - Instruct patient to call for assistance with activity   - Consult OT/PT to assist with strengthening/mobility   - Keep Call bell within reach  - Keep bed low and locked with side rails adjusted as appropriate  - Keep care items and personal belongings within reach  - Initiate and maintain comfort rounds  - Make Fall Risk Sign visible to staff  - Offer Toileting every 2 Hours, in advance of need  - Initiate/Maintain bed alarm  - Obtain necessary fall risk management equipment:   - Apply yellow socks and bracelet for high fall risk patients  - Consider moving patient to room near nurses station  Outcome: Progressing     Problem: MOBILITY - ADULT  Goal: Maintain or return to baseline ADL function  Description: INTERVENTIONS:  -  Assess patient's ability to carry out ADLs; assess patient's baseline for ADL function and identify physical deficits which impact ability to perform ADLs (bathing, care of mouth/teeth, toileting, grooming, dressing, etc )  - Assess/evaluate cause of self-care deficits   - Assess range of motion  - Assess patient's mobility; develop plan if impaired  - Assess patient's need for assistive devices and provide as appropriate  - Encourage maximum independence but intervene and supervise when necessary  - Involve family in performance of ADLs  - Assess for home care needs following discharge   - Consider OT consult to assist with ADL evaluation and planning for discharge  - Provide patient education as appropriate  Outcome: Progressing  Goal: Maintains/Returns to pre admission functional level  Description: INTERVENTIONS:  - Perform BMAT or MOVE assessment daily    - Set and communicate daily mobility goal to care team and patient/family/caregiver  - Collaborate with rehabilitation services on mobility goals if consulted  - Perform Range of Motion   - Reposition patient every 2 hours    - Dangle patient 4 times a day  - Stand patient 4 times a day  - Ambulate patient 4 times a day  - Out of bed to chair 4 times a day   - Out of bed for meals 3 times a day  - Out of bed for toileting  - Record patient progress and toleration of activity level   Outcome: Progressing

## 2022-09-12 NOTE — PROGRESS NOTES
Pt becoming increasingly agitated, getting OOB without calling for assistance and becoming aggressive with staff and difficult to redirect  B/L wrist restraints applied appropriately  Lisa Schofield with [facility] made aware  Order obtained  Discussed trialing off restraints in AM  Will continue to monitor

## 2022-09-12 NOTE — PLAN OF CARE
Problem: PHYSICAL THERAPY ADULT  Goal: Performs mobility at highest level of function for planned discharge setting  See evaluation for individualized goals  Description: Treatment/Interventions: Functional transfer training, LE strengthening/ROM, Therapeutic exercise, Endurance training, Cognitive reorientation, Equipment eval/education, Patient/family training, Bed mobility, Gait training  Equipment Recommended: [place]       See flowsheet documentation for full assessment, interventions and recommendations  Outcome: Progressing  Note: Prognosis: Fair  Problem List: Decreased strength, Decreased endurance, Impaired balance, Decreased mobility, Decreased cognition, Impaired judgement, Decreased safety awareness, Decreased skin integrity  Assessment: Pt seen for PT treatment session today w/ pt agreeable to participate  PT interventions provided include: bed mobility, transfer, gait, balance, and endurance traning in order to challenge pt's activity tolerance, and to maximize pt independence w/ functional mobility  Education provided on [facility] [employer], particularly when approaching a chair/toilet  In comparison to previous session, pt increased ambulatory endurance; however, pt fatigues quickly and requires seated therapeutic rest breaks between mobility trials  Pt continues to be functioning below baseline level, and remains limited in functional mobility due to impaired cognition, decreased safety awareness, impaired balance, and limited overall activity tolerance  Pt will continue to benefit from skilled PT intervention to promote pt's independence w/ functional mobility and progress towards set goals  Continue to recommend DC post acute rehabilitation services vs return to facility w/ skilled PT services pending level of assist available upon DC when medically cleared          PT Discharge Recommendation: Post acute rehabilitation services (vs return to facility w/ Pt services pending level of assistance available at facility upon DC)    See flowsheet documentation for full assessment

## 2022-09-12 NOTE — DISCHARGE INSTR - OTHER ORDERS
Skin Care  Plan:   1  Apply hydraguard to sacrum, b/l posterior thighs and hips, L buttocks, and b/l heels BID and PRN for prevention and protection  2  Apply skin nourishing cream the entire skin daily for moisture  3  Turn and reposition patient every  2 hours   4  Elevate heels off of bed with pillows to offload pressure   5  Apply EHOB waffle cushion to chair when OOB, if able  6  Cleanse R buttock wound with NSS, pat dry, and apply TRIAD paste to the wound bed  Cover with small bordered Allevyn foam dressing  Renan dressing with T  Change every other day and as needed for soilage/dislodgement  If patient soils dressing too often (greater than once per shift) please remove the dressing and use TRIAD paste BID and PRN to the wound bed  7 Cleanse wounds to right lateral ankle with NSS & pat dry  Open small Dermagran square & apply to wounds in a single layer cut to size of wounds to prevent maceration  Cover with small Allevyn bordered foam dressings  Change every other day & prn soilage/dislodgement  8  Follow-up with the [facility] \[facility]\"" - [facility] wound care center as an outpatient - please call [phone] for an appointment  (Ambulatory referral placed)

## 2022-09-12 NOTE — WOUND OSTOMY CARE
Progress Note - Wound   Lydia Dale 80 y o  male MRN: [id number]  Unit/Bed#: W -01 Encounter: [id number]      Assessment:  Wound care consulted for assessment of sacro-buttock wounds  Patient admitted with alzheimer disease with behavioral disturbance  History of - dementia, sacral wound, HTN, anxiety, and RA  Patient seen OOB in [facility] with PT staff  Patient is alert and oriented to self, cooperative for the assessment, able to follow commands  Patient incontinent of bowel and bladder per nursing flow-sheets  Moderate assist of 2 with standing for the assessment, patient is able to ambulate using the rolling walker with assistance  EHOB waffle cushion applied to the recliner chair  Patient is dependent for care  B/l heels are dry and intact without redness or wounds  Dry skin noted  L hip is dry and intact  R hip is intact with blanchable dry pink/hyperpigmented skin and scarring no open wounds noted  B/l posterior thighs are dry and intact with blanchable hyperpigmented skin  No maceration  L buttock and sacrum are dry and intact with blanchable pink/hyperpigmented skin and areas of blanchable scarring noted  Noted deep sacral intergluteal crease  No maceration noted  1  R buttock - unstageable pressure injury - POA  Wound is oval shaped full thickness  Approx: 20% moist pink/red granular tissue and 80% moist adhered yellow slough  True depth of wound is unknown due to devitalized tissue covering the wound bed  Edges fragile and attached with maceration  Jennifer-wound is intact with blanchable pink/hyperpigmented skin and scarring    -will recommend TRIAD paste to the wound and foam dressing to cover  Use small bordered foam dressing to isolate the wound  Directions provided if patient soils dressing too often  No induration, fluctuance, odor, warmth/temperature differences, redness, or purulence noted to the above noted wounds and skin areas assessed  New dressings applied   Patient tolerated assessment well- denies pain and no s/s of non-verbal pain or discomfort observed during the encounter  Primary nurse aware of plan of care  See flow sheets for more detailed assessment findings  Will follow along  Plan:   1  Apply hydraguard to sacrum, b/l posterior thighs and hips, L buttocks, and b/l heels BID and PRN for prevention and protection  2  Apply skin nourishing cream the entire skin daily for moisture  3  Turn and reposition patient every  2 hours   4  Elevate heels off of bed with pillows to offload pressure   5  Apply EHOB waffle cushion to chair when OOB, if able  6  Cleanse R buttock wound with NSS, pat dry, and apply TRIAD paste to the wound bed  Cover with small bordered Allevyn foam dressing  Renan dressing with T  Change every other day and as needed for soilage/dislodgement  If patient soils dressing too often (greater than once per shift) please remove the dressing and use TRIAD paste BID and PRN to the wound bed  7  Follow-up with the [facility] wound care center as an outpatient - please call [phone] for an appointment  (Ambulatory referral placed)  Objective:    Vitals: Blood pressure 117/54, pulse (!) 44, temperature 97 8 °F (36 6 °C), resp  rate 18, SpO2 93 %  ,There is no height or weight on file to calculate BMI  Wound 08/14/22 Pressure Injury Buttocks Right (Active)   Wound Image   09/12/22 0830   Wound Description Beefy red;Pink;Slough; Yellow 09/12/22 0830   Pressure Injury Stage U 09/12/22 0830   Jennifer-wound Assessment Intact;Fragile; Hyperpigmented; Maceration 09/12/22 0830   Wound Length (cm) 3 cm 09/12/22 0830   Wound Width (cm) 2 cm 09/12/22 0830   Wound Depth (cm) 0 2 cm 09/12/22 0830   Wound Surface Area (cm^2) 6 cm^2 09/12/22 0830   Wound Volume (cm^3) 1 2 cm^3 09/12/22 0830   Calculated Wound Volume (cm^3) 1 2 cm^3 09/12/22 0830   Tunneling 0 cm 09/12/22 0830   Tunneling in depth located at 0 09/12/22 0830   Undermining 0 09/12/22 0830 Undermining is depth extending from 0 09/12/22 0830   Drainage Amount Small 09/12/22 0830   Drainage Description Serosanguineous 09/12/22 0830   Non-staged Wound Description Full thickness 09/12/22 0830   Treatments Cleansed 09/12/22 0830   Dressing Foam, Silicon (eg  Allevyn, etc) 09/12/22 0830   Wound packed? No 09/12/22 0830   Packing- # removed 0 09/12/22 0830   Packing- # inserted 0 09/12/22 0830   Dressing Changed New 09/12/22 0830   Patient Tolerance Tolerated well 09/12/22 0830   Dressing Status Clean;Dry; Intact 09/12/22 0830           [place] text with questions or concerns  Wound care will follow along weekly  AVS updated    Ben Shook RN BSN CWON - trach collar trials by CTU

## 2022-09-12 NOTE — PLAN OF CARE
Problem: INFECTION - ADULT  Goal: Absence or prevention of progression during hospitalization  Description: INTERVENTIONS:  - Assess and monitor for signs and symptoms of infection  - Monitor lab/diagnostic results  - Monitor all insertion sites, i e  indwelling lines, tubes, and drains  - Monitor endotracheal if appropriate and nasal secretions for changes in amount and color  - [place] appropriate cooling/warming therapies per order  - Administer medications as ordered  - Instruct and encourage patient and family to use good hand hygiene technique  - Identify and instruct in appropriate isolation precautions for identified infection/condition  Outcome: Progressing  Goal: Absence of fever/infection during neutropenic period  Description: INTERVENTIONS:  - Monitor WBC    Outcome: Progressing     Problem: SAFETY ADULT  Goal: Patient will remain free of falls  Description: INTERVENTIONS:  - Educate patient/family on patient safety including physical limitations  - Instruct patient to call for assistance with activity   - Consult OT/PT to assist with strengthening/mobility   - Keep Call bell within reach  - Keep bed low and locked with side rails adjusted as appropriate  - Keep care items and personal belongings within reach  - Initiate and maintain comfort rounds  - Make Fall Risk Sign visible to staff  - Offer Toileting every 2 Hours, in advance of need  - Initiate/Maintain bed/chair alarm  - Obtain necessary fall risk management equipment: walker  - Apply yellow socks and bracelet for high fall risk patients  - Consider moving patient to room near nurses station  Outcome: Progressing  Goal: Maintain or return to baseline ADL function  Description: INTERVENTIONS:  -  Assess patient's ability to carry out ADLs; assess patient's baseline for ADL function and identify physical deficits which impact ability to perform ADLs (bathing, care of mouth/teeth, toileting, grooming, dressing, etc )  - Assess/evaluate cause of self-care deficits   - Assess range of motion  - Assess patient's mobility; develop plan if impaired  - Assess patient's need for assistive devices and provide as appropriate  - Encourage maximum independence but intervene and supervise when necessary  - Involve family in performance of ADLs  - Assess for home care needs following discharge   - Consider OT consult to assist with ADL evaluation and planning for discharge  - Provide patient education as appropriate  Outcome: Progressing  Goal: Maintains/Returns to pre admission functional level  Description: INTERVENTIONS:  - Perform BMAT or MOVE assessment daily    - Set and communicate daily mobility goal to care team and patient/family/caregiver     - Collaborate with rehabilitation services on mobility goals if consulted  - Stand patient 6 times a day  - Ambulate patient 4 times a day  - Out of bed to chair 3 times a day   - Out of bed for meals 3 times a day  - Out of bed for toileting  - Record patient progress and toleration of activity level   Outcome: Progressing     Problem: DISCHARGE PLANNING  Goal: Discharge to home or other facility with appropriate resources  Description: INTERVENTIONS:  - Identify barriers to discharge w/patient and caregiver  - Arrange for needed discharge resources and transportation as appropriate  - Identify discharge learning needs (meds, wound care, etc )  - Arrange for interpretive services to assist at discharge as needed  - Refer to Case Management Department for coordinating discharge planning if the patient needs post-hospital services based on physician/advanced practitioner order or complex needs related to functional status, cognitive ability, or social support system  Outcome: Progressing     Problem: Knowledge Deficit  Goal: Patient/family/caregiver demonstrates understanding of disease process, treatment plan, medications, and discharge instructions  Description: Complete learning assessment and assess knowledge base   Interventions:  - Provide teaching at level of understanding  - Provide teaching via preferred learning methods  Outcome: Progressing     Problem: Prexisting or High Potential for Compromised Skin Integrity  Goal: Skin integrity is maintained or improved  Description: INTERVENTIONS:  - Identify patients at risk for skin breakdown  - Assess and monitor skin integrity  - Assess and monitor nutrition and hydration status  - Monitor labs   - Assess for incontinence   - Turn and reposition patient  - Assist with mobility/ambulation  - Relieve pressure over bony prominences  - Avoid friction and shearing  - Provide appropriate hygiene as needed including keeping skin clean and dry  - Evaluate need for skin moisturizer/barrier cream  - Collaborate with interdisciplinary team   - Patient/family teaching  - Consider wound care consult   Outcome: Progressing     Problem: Potential for Falls  Goal: Patient will remain free of falls  Description: INTERVENTIONS:  - Educate patient/family on patient safety including physical limitations  - Instruct patient to call for assistance with activity   - Consult OT/PT to assist with strengthening/mobility   - Keep Call bell within reach  - Keep bed low and locked with side rails adjusted as appropriate  - Keep care items and personal belongings within reach  - Initiate and maintain comfort rounds  - Make Fall Risk Sign visible to staff  - Offer Toileting every 2 Hours, in advance of need  - Initiate/Maintain bed/chair alarm  - Obtain necessary fall risk management equipment: walker  - Apply yellow socks and bracelet for high fall risk patients  - Consider moving patient to room near nurses station  Outcome: Progressing     Problem: MOBILITY - ADULT  Goal: Maintain or return to baseline ADL function  Description: INTERVENTIONS:  -  Assess patient's ability to carry out ADLs; assess patient's baseline for ADL function and identify physical deficits which impact ability to perform ADLs (bathing, care of mouth/teeth, toileting, grooming, dressing, etc )  - Assess/evaluate cause of self-care deficits   - Assess range of motion  - Assess patient's mobility; develop plan if impaired  - Assess patient's need for assistive devices and provide as appropriate  - Encourage maximum independence but intervene and supervise when necessary  - Involve family in performance of ADLs  - Assess for home care needs following discharge   - Consider OT consult to assist with ADL evaluation and planning for discharge  - Provide patient education as appropriate  Outcome: Progressing  Goal: Maintains/Returns to pre admission functional level  Description: INTERVENTIONS:  - Perform BMAT or MOVE assessment daily    - Set and communicate daily mobility goal to care team and patient/family/caregiver     - Collaborate with rehabilitation services on mobility goals if consulted  - Stand patient 6 times a day  - Ambulate patient 4 times a day  - Out of bed to chair 3 times a day   - Out of bed for meals 3 times a day  - Out of bed for toileting  - Record patient progress and toleration of activity level   Outcome: Progressing

## 2022-09-12 NOTE — PROGRESS NOTES
[facility]  Progress Note - María Mathias 9/7/1931, 80 y o  male MRN: [id number]  Unit/Bed#: W -01 Encounter: [id number]  Primary Care Provider: No primary care provider on file  Date and time admitted to hospital: 9/8/2022  3:17 PM    * Late onset Alzheimer's disease with behavioral disturbance ([facility] [address] )  Assessment & Plan  · Patient [facility] from current facility [facility]) for worsening dementia with agitated features  · Patient has no idea why he is here, unable to give any pertinent history  · Per patient's daughter facility told her he had an abscess in his buttocks which required evaluation  · Continue Seroquel  Zyprexa p r n  Karin Burow · Restraints removed this morning  · PT/OT - awaiting rehab  · He is from [facility], they can take him back after he comes back from rehab    Pressure injury of sacral region, stage 2 [facility])  Assessment & Plan  · Wound care nurse consult, continue supportive care  · Does not appear infected  · Offloading and local wound care    Primary hypertension  Assessment & Plan  · Patient has been on Lopressor 50 mg daily prior to admission, this was decreased to 25 mg however still with sinus bradycardia  Will discontinue in favor of amlodipine 5 mg daily  · Monitor BP    Rheumatoid arthritis with positive rheumatoid factor (HCC)  Assessment & Plan  · On chronic steroids will continue        VTE Pharmacologic Prophylaxis: VTE Score: 4 Moderate Risk (Score 3-4) - Pharmacological DVT Prophylaxis Ordered: enoxaparin (Lovenox)  Patient Centered Rounds: I performed bedside rounds with nursing staff today  Discussions with Specialists or Other Care Team Provider: jean, rn    Education and Discussions with Family / Patient: Updated  (daughter) via phone  Gilberto Pratt    Time Spent for Care: 30 minutes  More than 50% of total time spent on counseling and coordination of care as described above      Current Length of Stay: 3 day(s)  Current Patient Status: Inpatient   Certification Statement: The patient will continue to require additional inpatient hospital stay due to Awaiting rehab placement  Discharge Plan: Anticipate discharge later today or tomorrow to rehab facility  Code Status: Level 1 - Full Code    Subjective:   Overnight events per nursing  Has been weaned off restraints this morning  Mood appears stable  He does not have any complaints  Objective:     Vitals:   Temp (24hrs), Av 8 °F (36 6 °C), Min:97 7 °F (36 5 °C), Max:97 8 °F (36 6 °C)    Temp:  [97 7 °F (36 5 °C)-97 8 °F (36 6 °C)] 97 8 °F (36 6 °C)  HR:  [44-77] 44  Resp:  [18] 18  BP: (117-210)/() 117/54  SpO2:  [84 %-97 %] 93 %  There is no height or weight on file to calculate BMI  Input and Output Summary (last 24 hours): Intake/Output Summary (Last 24 hours) at 2022 1045  Last data filed at 2022 1001  Gross per 24 hour   Intake 960 ml   Output 400 ml   Net 560 ml       Physical Exam:   Physical Exam  Vitals and nursing note reviewed  Constitutional:       General: He is not in acute distress  Appearance: Normal appearance  HENT:      Head: Normocephalic  Mouth/Throat:      Mouth: Mucous membranes are moist    Eyes:      Pupils: Pupils are equal, round, and reactive to light  Cardiovascular:      Rate and Rhythm: Normal rate and regular rhythm  Heart sounds: No murmur heard  Pulmonary:      Effort: Pulmonary effort is normal  No respiratory distress  Breath sounds: Normal breath sounds  No wheezing, rhonchi or rales  Abdominal:      General: Bowel sounds are normal  There is no distension  Palpations: Abdomen is soft  Tenderness: There is no abdominal tenderness  There is no guarding  Musculoskeletal:         General: No deformity  Cervical back: Normal range of motion  Right lower leg: No edema  Left lower leg: No edema     Skin:     Capillary Refill: Capillary refill takes less than 2 seconds  Neurological:      General: No focal deficit present  Mental Status: He is alert  Mental status is at baseline  He is disoriented  Additional Data:     Labs:  Results from last 7 days   Lab Units 09/08/22  1632   WBC Thousand/uL 7 02   HEMOGLOBIN g/dL 12 1   HEMATOCRIT % 37 9   PLATELETS Thousands/uL 177   NEUTROS PCT % 70   LYMPHS PCT % 20   MONOS PCT % 8   EOS PCT % 1     Results from last 7 days   Lab Units 09/08/22  1632   SODIUM mmol/L 142   POTASSIUM mmol/L 5 0   CHLORIDE mmol/L 109*   CO2 mmol/L 27   BUN mg/dL 39*   CREATININE mg/dL 1 72*   ANION GAP mmol/L 6   CALCIUM mg/dL 8 5   ALBUMIN g/dL 3 5   TOTAL BILIRUBIN mg/dL 0 38   ALK PHOS U/L 70   ALT U/L 5*   AST U/L 18   GLUCOSE RANDOM mg/dL 114                       Lines/Drains:  Invasive Devices  Report    None                       Imaging: No pertinent imaging reviewed      Recent Cultures (last 7 days):         Last 24 Hours Medication List:   Current Facility-Administered Medications   Medication Dose Route Frequency Provider Last Rate    acetaminophen  650 mg Oral Q6H PRN Joanne Jason MD      aluminum-magnesium hydroxide-simethicone  30 mL Oral Q6H PRN Joanne Jason MD      [START ON 9/13/2022] amLODIPine  5 mg Oral Daily Dustin Otoole PA-C      citalopram  20 mg Oral Daily Joanne Jason MD      clopidogrel  75 mg Oral Daily Joanne Jason MD      donepezil  10 mg Oral HS Joanne Jason MD      enoxaparin  40 mg Subcutaneous Daily Joanne Jason MD      gabapentin  100 mg Oral BID Joanne Jason MD      OLANZapine  5 mg Oral Q3H PRN Dustin Otoole PA-C      ondansetron  4 mg Intravenous Q6H PRN Joanne Jason MD      polyethylene glycol  17 g Oral Daily PRN Joanne Jason MD      predniSONE  10 mg Oral Daily Joanne Jason MD      QUEtiapine  100 mg Oral HS Joanne Jason MD          Today, Patient Was Seen By: Megan Segal PA-C    **Please Note: This note may have been constructed using a voice recognition system  **

## 2022-09-12 NOTE — ASSESSMENT & PLAN NOTE
· Wound care nurse consult, continue supportive care    · Does not appear infected  · Offloading and local wound care

## 2022-09-12 NOTE — PHYSICAL THERAPY NOTE
PHYSICAL THERAPY TREATMENT NOTE          Patient Name: Lele Orr  EIWFH'D Date: 22 0844   PT Last Visit   PT Visit Date 22   Note Type   Note Type Treatment   Pain Assessment   Pain Assessment Tool 0-10   Pain Score No Pain   Restrictions/Precautions   Weight Bearing Precautions Per Order No   Other Precautions Cognitive; Chair Alarm; Bed Alarm; Fall Risk   General   Chart Reviewed Yes   Response to Previous Treatment Patient with no complaints from previous session  Family/Caregiver Present No   Cognition   Overall Cognitive Status Impaired   Arousal/Participation Cooperative   Orientation Level Oriented to person;Disoriented to place; Disoriented to time;Disoriented to situation   Memory Decreased short term memory;Decreased recall of recent events;Decreased recall of precautions   Following Commands Follows one step commands with increased time or repetition   Comments Pt ID via name and ; pt agreeable to OOB mobility   Bed Mobility   Supine to Sit 5  Supervision   Additional items Assist x 1;HOB elevated; Bedrails; Increased time required;Verbal cues   Sit to Supine 5  Supervision   Additional items Assist x 1; Increased time required;Verbal cues   Additional Comments pt able to don socks at EOB w/ supervision and VC   Transfers   Sit to Stand 4  Minimal assistance   Additional items Assist x 1; Increased time required;Verbal cues  (CGA/steadying assist)   Stand to Sit 4  Minimal assistance   Additional items Assist x 1; Armrests; Increased time required;Verbal cues   Toilet transfer 3  Moderate assistance   Additional items Assist x 1; Armrests; Increased time required;Standard toilet  (x2 transfers)   Additional Comments VC for hand placement, chair approach as pt tends to abandon RW  Pt performed 3 sit<>stand transfers and 2 toliet transfers   Ambulation/Elevation   Gait pattern Forward Flexion; Wide JEFRY; Decreased foot clearance; Short stride   Gait Assistance 4  Minimal assist   Additional items Assist x 1;Verbal cues  (1 posterior LOB while turning requiring Ax1 to correct)   Assistive Device Rolling walker   Distance 50'+40'+10'   Ambulation/Elevation Additional Comments VC for RW mangement, to maintain closer proximity while ambulating   Balance   Static Sitting Fair +   Dynamic Sitting Fair   Static Standing Fair -   Dynamic Standing Poor +   Ambulatory Poor +  (w/ RW)   Endurance Deficit   Endurance Deficit Yes   Endurance Deficit Description limited ambulatory tolerance, distance   Activity Tolerance   Activity Tolerance Patient limited by fatigue   Nurse Made Aware RN Norleen Hodgkin; Care coordination w/ OT Lexee for portion of tx due to pt's limited activity tolerance, level of assist required w/ toileting/hygiene, and cognitive/safety awareness deficits requiring two skilled clinicians; individual items assessed and goals addressed   Assessment   Prognosis Fair   Problem List Decreased strength;Decreased endurance; Impaired balance;Decreased mobility; Decreased cognition; Impaired judgement;Decreased safety awareness;Decreased skin integrity   Assessment Pt seen for PT treatment session today w/ pt agreeable to participate  PT interventions provided include: bed mobility, transfer, gait, balance, and endurance traning in order to challenge pt's activity tolerance, and to maximize pt independence w/ functional mobility  Education provided on [employer], particularly when approaching a chair/toilet  In comparison to previous session, pt increased ambulatory endurance; however, pt fatigues quickly and requires seated therapeutic rest breaks between mobility trials  Pt continues to be functioning below baseline level, and remains limited in functional mobility due to impaired cognition, decreased safety awareness, impaired balance, and limited overall activity tolerance   Pt will continue to benefit from skilled PT intervention to promote pt's independence w/ functional mobility and progress towards set goals  Continue to recommend DC post acute rehabilitation services vs return to facility w/ skilled PT services pending level of assist available upon DC when medically cleared  Goals   Patient Goals to lay down in bed   STG Expiration Date 09/20/22   Short Term Goal #1 Pt will be able to: (1) perform bed mobility with mod I to promote OOB activity (2) perform sit to stand with supervision to decrease burden of care (3) ambulate at least 200` with supervision and least restrictive AD to increase activity tolerance (4) increase standing balance by 1 grade to decrease risk of falls   PT Treatment Day 1   Plan   Treatment/Interventions Functional transfer training;LE strengthening/ROM; Therapeutic exercise; Endurance training;Equipment eval/education; Bed mobility; Patient/family training;Cognitive reorientation;Gait training   Progress Progressing toward goals   PT Frequency 3-5x/wk   Recommendation   PT Discharge Recommendation Post acute rehabilitation services  (vs return to facility w/ Pt services pending level of assistance available at facility upon DC)   Equipment Recommended [place] walker   Change/add to [employer]?  No   Additional Comments Due to pt's baseline dementia, pt will most likely benefit from returning to a familiar environment w/ familiar staff and routine   [place] [address] in Bed Without Bedrails 3   Lying on Back to Sitting on Edge of Flat Bed 3   Moving Bed to Chair 3   Standing Up From Chair 3   Walk in Room 3   Climb 3-5 Stairs 1   Basic Mobility Inpatient Raw Score 16   Basic Mobility Standardized Score 38 32   Highest Level Of Mobility   JH-HLM Goal 5: Stand one or more mins   JH-HLM Achieved 7: Walk 25 feet or more   Education   Education Provided Mobility training;Assistive device   Patient Demonstrates acceptance/verbal understanding;Reinforcement needed   End of Consult   Patient Position at End of Consult Supine;Bed/Chair alarm activated; All needs within reach         The patient's AM-PAC Basic Mobility Inpatient Short Form Raw Score is 16  A Raw score of less than or equal to 16 suggests the patient may benefit from discharge to post-acute rehabilitation services  Please also refer to the recommendation of the Physical Therapist for safe discharge planning  Pt will continue to benefit from skilled inpatient PT during this admission in order to facilitate progress towards goals and to maximize pt's independence w/ functional mobility      DC rec: post acute rehab vs return to facility w/ skilled PT services pending level of assistance available upon Teodora Castro PT, DPT  09/12/22

## 2022-09-12 NOTE — ASSESSMENT & PLAN NOTE
· Patient transferred from current facility [facility]) for worsening dementia with agitated features  · Patient has no idea why he is here, unable to give any pertinent history  · Per patient's daughter facility told her he had an abscess in his buttocks which required evaluation  · Continue Seroquel  Sharla Martino · Restraints removed this morning    · PT/OT - awaiting rehab  · He is from [facility], they can take him back after he comes back from rehab

## 2022-09-13 PROCEDURE — 0001A HB IMMUNIZATION ADMIN COVID-19 30MCG/0.3ML FIRST DOSE: CPT | Performed by: PHYSICIAN ASSISTANT

## 2022-09-13 PROCEDURE — 97530 THERAPEUTIC ACTIVITIES: CPT

## 2022-09-13 PROCEDURE — 97116 GAIT TRAINING THERAPY: CPT

## 2022-09-13 PROCEDURE — 99232 SBSQ HOSP IP/OBS MODERATE 35: CPT | Performed by: PHYSICIAN ASSISTANT

## 2022-09-13 PROCEDURE — 91300 COVID-19 PFIZER VAC BIVALENT TRIS-SUCROSE 30 MCG/0.3 ML SUSP: CPT | Performed by: PHYSICIAN ASSISTANT

## 2022-09-13 RX ADMIN — CITALOPRAM HYDROBROMIDE 20 MG: 20 TABLET ORAL at 08:37

## 2022-09-13 RX ADMIN — BNT162B2 ORIGINAL AND OMICRON BA.4/BA.5 0.3 ML: .1125; .1125 INJECTION, SUSPENSION INTRAMUSCULAR at 17:39

## 2022-09-13 RX ADMIN — GABAPENTIN 100 MG: 100 CAPSULE ORAL at 08:36

## 2022-09-13 RX ADMIN — GABAPENTIN 100 MG: 100 CAPSULE ORAL at 17:39

## 2022-09-13 RX ADMIN — ENOXAPARIN SODIUM 40 MG: 40 INJECTION SUBCUTANEOUS at 08:37

## 2022-09-13 RX ADMIN — AMLODIPINE BESYLATE 5 MG: 5 TABLET ORAL at 08:37

## 2022-09-13 RX ADMIN — PREDNISONE 10 MG: 10 TABLET ORAL at 08:37

## 2022-09-13 RX ADMIN — CLOPIDOGREL BISULFATE 75 MG: 75 TABLET ORAL at 08:37

## 2022-09-13 RX ADMIN — DONEPEZIL HYDROCHLORIDE 10 MG: 5 TABLET ORAL at 21:48

## 2022-09-13 RX ADMIN — QUETIAPINE FUMARATE 100 MG: 100 TABLET ORAL at 21:48

## 2022-09-13 NOTE — PLAN OF CARE
Problem: PHYSICAL THERAPY ADULT  Goal: Performs mobility at highest level of function for planned discharge setting  See evaluation for individualized goals  Description: Treatment/Interventions: Functional transfer training, LE strengthening/ROM, Therapeutic exercise, Endurance training, Cognitive reorientation, Equipment eval/education, Patient/family training, Bed mobility, Gait training  Equipment Recommended: [employer] Cranker       See flowsheet documentation for full assessment, interventions and recommendations  Outcome: Progressing  Note: Prognosis: Fair  Problem List: Decreased strength, Impaired balance, Decreased mobility, Decreased cognition, Impaired judgement, Decreased safety awareness, Decreased skin integrity  Assessment: pt began tx session lying supine in the bed and was agreeable to participate in PT intervention  pt continues to remain consistant with /s for all bed mobility as pt required VC's to use bed rails to assista in helping complete transfers to seated EOB  Once seated EOB pt completed TE activities w/o LOB for 6 minutes in order to increase static/dynamic sitting balance  pt continues to remain consistant with min Ax1 for all functional transfers to and from RW with VC's for hand placement while ascending to rW and descending back to seated in recliner  pt continues to be limited with funcitonal mobility as pt was able to increase ambulation distance compared to previous tx session but was limited today due to fatigue and degradation of gait and slight LOB  pt would benefit from continued skilled PT intervention in order to increase activity tolerance and ambulation distance  post tx pt in bed with call bell and all pt needs met        PT Discharge Recommendation: Post acute rehabilitation services (vs return to facility with PT services)    See flowsheet documentation for full assessment

## 2022-09-13 NOTE — PROGRESS NOTES
[facility]  Progress Note - Kvngpoonam Paulsonlia 9/7/1931, 80 y o  male MRN: [id number]  Unit/Bed#: W -01 Encounter: [id number]  Primary Care Provider: No primary care provider on file  Date and time admitted to hospital: 9/8/2022  3:17 PM    * Late onset Alzheimer's disease with behavioral disturbance ([facility] [address] )  Assessment & Plan  · Patient transferred from current facility [facility] for worsening dementia with agitated features  · Patient has no idea why he is here, unable to give any pertinent history  · Per patient's daughter facility told her he had an abscess in his buttocks which required evaluation  · Continue Seroquel  Zyprexa p r n  Noni Veliz · Restraints removed 09/12  · PT/OT - awaiting rehab  · He is from [facility], they can take him back after he comes back from rehab    Pressure injury of sacral region, stage 2 [facility])  Assessment & Plan  · Wound care nurse consult, continue supportive care  · Does not appear infected  · Offloading and local wound care    Primary hypertension  Assessment & Plan  · Patient has been on Lopressor 50 mg daily prior to admission, this was decreased to 25 mg however still with sinus bradycardia  Will discontinue in favor of amlodipine 5 mg daily  · Monitor BP    Rheumatoid arthritis with positive rheumatoid factor (HCC)  Assessment & Plan  · On chronic steroids will continue          VTE Pharmacologic Prophylaxis: VTE Score: 4 Moderate Risk (Score 3-4) - Pharmacological DVT Prophylaxis Ordered: enoxaparin (Lovenox)  Patient Centered Rounds: I performed bedside rounds with nursing staff today  Discussions with Specialists or Other Care Team Provider: JORGE,RN    Education and Discussions with Family / Patient: Updated  (daughter) via phone  Time Spent for Care: 30 minutes  More than 50% of total time spent on counseling and coordination of care as described above      Current Length of Stay: 4 day(s)  Current Patient Status: Inpatient   Certification Statement: The patient will continue to require additional inpatient hospital stay due to pending rehab placement   Discharge Plan: asap pending rehab placement     Code Status: Level 1 - Full Code    Subjective:   Patient is resting in bed comfortably, no complaints at this time  No overnight events noted  Patient has been stable off of restraints since   Objective:     Vitals:   Temp (24hrs), Av 2 °F (36 8 °C), Min:96 4 °F (35 8 °C), Max:99 2 °F (37 3 °C)    Temp:  [96 4 °F (35 8 °C)-99 2 °F (37 3 °C)] 96 4 °F (35 8 °C)  HR:  [57-74] 61  Resp:  [16] 16  BP: (120-157)/(57-81) 127/67  SpO2:  [93 %-96 %] 95 %  There is no height or weight on file to calculate BMI  Input and Output Summary (last 24 hours): Intake/Output Summary (Last 24 hours) at 2022 1028  Last data filed at 2022 0901  Gross per 24 hour   Intake 1800 ml   Output 952 ml   Net 848 ml       Physical Exam:   Physical Exam  Constitutional:       General: He is not in acute distress  HENT:      Mouth/Throat:      Mouth: Mucous membranes are moist    Eyes:      General: No scleral icterus  Cardiovascular:      Rate and Rhythm: Normal rate and regular rhythm  Heart sounds: Normal heart sounds  Pulmonary:      Breath sounds: Normal breath sounds  Abdominal:      General: Abdomen is flat  Bowel sounds are normal       Palpations: Abdomen is soft  Musculoskeletal:      Right lower leg: No edema  Left lower leg: No edema  Skin:     General: Skin is warm  Neurological:      Mental Status: He is alert  Mental status is at baseline  He is disoriented     Psychiatric:         Mood and Affect: Mood normal           Additional Data:     Labs:  Results from last 7 days   Lab Units 22  1632   WBC Thousand/uL 7 02   HEMOGLOBIN g/dL 12 1   HEMATOCRIT % 37 9   PLATELETS Thousands/uL 177   NEUTROS PCT % 70   LYMPHS PCT % 20   MONOS PCT % 8   EOS PCT % 1     Results from last 7 days   Lab Units 09/08/22  1632   SODIUM mmol/L 142   POTASSIUM mmol/L 5 0   CHLORIDE mmol/L 109*   CO2 mmol/L 27   BUN mg/dL 39*   CREATININE mg/dL 1 72*   ANION GAP mmol/L 6   CALCIUM mg/dL 8 5   ALBUMIN g/dL 3 5   TOTAL BILIRUBIN mg/dL 0 38   ALK PHOS U/L 70   ALT U/L 5*   AST U/L 18   GLUCOSE RANDOM mg/dL 114                       Lines/Drains:  Invasive Devices  Report    None                       Imaging: No pertinent imaging reviewed  Recent Cultures (last 7 days):         Last 24 Hours Medication List:   Current Facility-Administered Medications   Medication Dose Route Frequency Provider Last Rate    acetaminophen  650 mg Oral Q6H PRN Fer Escoto MD      aluminum-magnesium hydroxide-simethicone  30 mL Oral Q6H PRN Fer Escoto MD      amLODIPine  5 mg Oral Daily Himanshu Dickinson PA-C      citalopram  20 mg Oral Daily Fer Escoto MD      clopidogrel  75 mg Oral Daily Fer Escoto MD      donepezil  10 mg Oral HS Fer Escoto MD      enoxaparin  40 mg Subcutaneous Daily Fer Escoto MD      gabapentin  100 mg Oral BID Fer Escoto MD      OLANZapine  5 mg Oral Q3H PRN Himanshu Dickinson PA-C      ondansetron  4 mg Intravenous Q6H PRN Fer Escoto MD      polyethylene glycol  17 g Oral Daily PRN Fer Escoto MD      predniSONE  10 mg Oral Daily Fer Escoto MD      QUEtiapine  100 mg Oral HS Fer Escoto MD          Today, Patient Was Seen By: Ramesh Ahmadi PA-C    **Please Note: This note may have been constructed using a voice recognition system  **

## 2022-09-13 NOTE — CASE MANAGEMENT
Case Management Progress Note    Patient name Perez Oliva  Location W /W [address] [id number] MRN [id number]  : 1931 Date 2022       LOS (days): 4  Geometric Mean LOS (GMLOS) (days): 4 50  Days to GMLOS:0 5        OBJECTIVE:        Current admission status: Inpatient  Preferred Pharmacy:   [address], [place] [id number]  Phone: [phone] Fax: [phone]    [id number] [place], [address]  [place]  [address] [place] [address]  Phone: [phone] Fax: [phone]    Primary Care Provider: No primary care provider on file  Primary Insurance: Brennon Rides REP  Secondary Insurance:     PROGRESS NOTE:  CM called [facility] @ P: [phone] and spoke with Luis about pt's return  Luis referred CM to call the , Maurice Chand @P: [phone]  CM called and spoke with Maurice Chand about pt's return to their facility and his progress with PT of being back to his possible baseline and mobility  Maurice Chand states pt needs to be at the baseline of no assistance required  Maurice Chand also relayed when pt was discharged from [facility] pt was to receive home health care through [facility], but no one showed up  Maurice Chand states they showed up after the pt was admitted to the hospital   Pt received no wound care either during this time  Maurice Chand requested to call CM back after her meeting to discuss further  CM reached out to [facility] at V:[phone] to discuss filling out the application for [facility], as this is the only possible facility at this time

## 2022-09-13 NOTE — PLAN OF CARE
Problem: INFECTION - ADULT  Goal: Absence or prevention of progression during hospitalization  Description: INTERVENTIONS:  - Assess and monitor for signs and symptoms of infection  - Monitor lab/diagnostic results  - Monitor all insertion sites, i e  indwelling lines, tubes, and drains  - Monitor endotracheal if appropriate and nasal secretions for changes in amount and color  - [place] appropriate cooling/warming therapies per order  - Administer medications as ordered  - Instruct and encourage patient and family to use good hand hygiene technique  - Identify and instruct in appropriate isolation precautions for identified infection/condition  Outcome: Progressing  Goal: Absence of fever/infection during neutropenic period  Description: INTERVENTIONS:  - Monitor WBC    Outcome: Progressing     Problem: SAFETY ADULT  Goal: Patient will remain free of falls  Description: INTERVENTIONS:  - Educate patient/family on patient safety including physical limitations  - Instruct patient to call for assistance with activity   - Consult OT/PT to assist with strengthening/mobility   - Keep Call bell within reach  - Keep bed low and locked with side rails adjusted as appropriate  - Keep care items and personal belongings within reach  - Initiate and maintain comfort rounds  - Make Fall Risk Sign visible to staff  - Offer Toileting every 2 Hours, in advance of need  - Initiate/Maintain bed/chair alarm  - Obtain necessary fall risk management equipment: walker  - Apply yellow socks and bracelet for high fall risk patients  - Consider moving patient to room near nurses station  Outcome: Progressing  Goal: Maintain or return to baseline ADL function  Description: INTERVENTIONS:  -  Assess patient's ability to carry out ADLs; assess patient's baseline for ADL function and identify physical deficits which impact ability to perform ADLs (bathing, care of mouth/teeth, toileting, grooming, dressing, etc )  - Assess/evaluate cause of self-care deficits   - Assess range of motion  - Assess patient's mobility; develop plan if impaired  - Assess patient's need for assistive devices and provide as appropriate  - Encourage maximum independence but intervene and supervise when necessary  - Involve family in performance of ADLs  - Assess for home care needs following discharge   - Consider OT consult to assist with ADL evaluation and planning for discharge  - Provide patient education as appropriate  Outcome: Progressing  Goal: Maintains/Returns to pre admission functional level  Description: INTERVENTIONS:  - Perform BMAT or MOVE assessment daily    - Set and communicate daily mobility goal to care team and patient/family/caregiver     - Collaborate with rehabilitation services on mobility goals if consulted  - Stand patient 6 times a day  - Ambulate patient 4 times a day  - Out of bed to chair 3 times a day   - Out of bed for meals 3 times a day  - Out of bed for toileting  - Record patient progress and toleration of activity level   Outcome: Progressing     Problem: DISCHARGE PLANNING  Goal: Discharge to home or other facility with appropriate resources  Description: INTERVENTIONS:  - Identify barriers to discharge w/patient and caregiver  - Arrange for needed discharge resources and transportation as appropriate  - Identify discharge learning needs (meds, wound care, etc )  - Arrange for interpretive services to assist at discharge as needed  - Refer to Case Management Department for coordinating discharge planning if the patient needs post-hospital services based on physician/advanced practitioner order or complex needs related to functional status, cognitive ability, or social support system  Outcome: Progressing     Problem: Knowledge Deficit  Goal: Patient/family/caregiver demonstrates understanding of disease process, treatment plan, medications, and discharge instructions  Description: Complete learning assessment and assess knowledge base   Interventions:  - Provide teaching at level of understanding  - Provide teaching via preferred learning methods  Outcome: Progressing     Problem: Prexisting or High Potential for Compromised Skin Integrity  Goal: Skin integrity is maintained or improved  Description: INTERVENTIONS:  - Identify patients at risk for skin breakdown  - Assess and monitor skin integrity  - Assess and monitor nutrition and hydration status  - Monitor labs   - Assess for incontinence   - Turn and reposition patient  - Assist with mobility/ambulation  - Relieve pressure over bony prominences  - Avoid friction and shearing  - Provide appropriate hygiene as needed including keeping skin clean and dry  - Evaluate need for skin moisturizer/barrier cream  - Collaborate with interdisciplinary team   - Patient/family teaching  - Consider wound care consult   Outcome: Progressing     Problem: Potential for Falls  Goal: Patient will remain free of falls  Description: INTERVENTIONS:  - Educate patient/family on patient safety including physical limitations  - Instruct patient to call for assistance with activity   - Consult OT/PT to assist with strengthening/mobility   - Keep Call bell within reach  - Keep bed low and locked with side rails adjusted as appropriate  - Keep care items and personal belongings within reach  - Initiate and maintain comfort rounds  - Make Fall Risk Sign visible to staff  - Offer Toileting every 2 Hours, in advance of need  - Initiate/Maintain bed/chair alarm  - Obtain necessary fall risk management equipment: walker  - Apply yellow socks and bracelet for high fall risk patients  - Consider moving patient to room near nurses station  Outcome: Progressing     Problem: MOBILITY - ADULT  Goal: Maintain or return to baseline ADL function  Description: INTERVENTIONS:  -  Assess patient's ability to carry out ADLs; assess patient's baseline for ADL function and identify physical deficits which impact ability to perform ADLs (bathing, care of mouth/teeth, toileting, grooming, dressing, etc )  - Assess/evaluate cause of self-care deficits   - Assess range of motion  - Assess patient's mobility; develop plan if impaired  - Assess patient's need for assistive devices and provide as appropriate  - Encourage maximum independence but intervene and supervise when necessary  - Involve family in performance of ADLs  - Assess for home care needs following discharge   - Consider OT consult to assist with ADL evaluation and planning for discharge  - Provide patient education as appropriate  Outcome: Progressing  Goal: Maintains/Returns to pre admission functional level  Description: INTERVENTIONS:  - Perform BMAT or MOVE assessment daily    - Set and communicate daily mobility goal to care team and patient/family/caregiver  - Collaborate with rehabilitation services on mobility goals if consulted  - Stand patient 6 times a day  - Ambulate patient 4 times a day  - Out of bed to chair 3 times a day   - Out of bed for meals 3 times a day  - Out of bed for toileting  - Record patient progress and toleration of activity level   Outcome: Progressing     Problem: Nutrition/Hydration-ADULT  Goal: Nutrient/Hydration intake appropriate for improving, restoring or maintaining nutritional needs  Description: Monitor and assess patient's nutrition/hydration status for malnutrition  Collaborate with interdisciplinary team and initiate plan and interventions as ordered  Monitor patient's weight and dietary intake as ordered or per policy  Utilize nutrition screening tool and intervene as necessary  Determine patient's food preferences and provide high-protein, high-caloric foods as appropriate       INTERVENTIONS:  - Monitor oral intake, urinary output, labs, and treatment plans  - Assess nutrition and hydration status and recommend course of action  - Evaluate amount of meals eaten  - Assist patient with eating if necessary   - Allow adequate time for meals  - Recommend/ encourage appropriate diets, oral nutritional supplements, and vitamin/mineral supplements  - Order, calculate, and assess calorie counts as needed  - Recommend, monitor, and adjust tube feedings and TPN/PPN based on assessed needs  - Assess need for intravenous fluids  - Provide specific nutrition/hydration education as appropriate  - Include patient/family/caregiver in decisions related to nutrition  Outcome: Progressing

## 2022-09-13 NOTE — PHYSICAL THERAPY NOTE
PHYSICAL THERAPY NOTE          Patient Name: Hortencia PHILIPPE Date: 9/13/2022 09/13/22 0932   PT Last Visit   PT Visit Date 09/13/22   Note Type   Note Type Treatment   Pain Assessment   Pain Assessment Tool 0-10   Pain Score No Pain   Effect of Pain on Daily Activities limited functional mobility and activity tolerance   Patient's Stated Pain Goal No pain   Hospital Pain Intervention(s) Repositioned; Ambulation/increased activity; Emotional support; Rest   Multiple Pain Sites No   Restrictions/Precautions   Weight Bearing Precautions Per Order No   Other Precautions Impulsive;Cognitive; Chair Alarm; Bed Alarm; Fall Risk   General   Chart Reviewed Yes   Response to Previous Treatment Patient with no complaints from previous session  Family/Caregiver Present No   Cognition   Overall Cognitive Status Impaired   Arousal/Participation Alert; Responsive; Cooperative   Attention Attends with cues to redirect   Orientation Level Oriented to person;Oriented to place   Memory Decreased short term memory;Decreased recall of recent events   Following Commands Follows one step commands with increased time or repetition   Comments pt was cooperative but impulsive at times and required VC's to slow down and keep hands on RW while ambulating   Subjective   Subjective pt was agreeable to participate in pt intervention   Bed Mobility   Supine to Sit 5  Supervision   Additional items Assist x 1;HOB elevated; Bedrails; Increased time required;Verbal cues   Sit to Supine 5  Supervision   Additional items Assist x 1;HOB elevated; Bedrails; Increased time required;Verbal cues   Additional Comments pt required mod Ax2 to reposition towards  [facility]   Transfers   Sit to Stand 4  Minimal assistance   Additional items Assist x 1; Increased time required;Verbal cues   Stand to Sit 4  Minimal assistance   Additional items Assist x 1; Increased time required;Verbal cues   Stand pivot 4  Minimal assistance   Additional items Assist x 1; Increased time required   Additional Comments pt continues to require RW and VC's to complete all functional transfers safely in todays tx session   Ambulation/Elevation   Gait pattern Decreased foot clearance; Foward flexed; Short stride; Excessively slow;Decreased heel strike;Decreased toe off   Gait Assistance 4  Minimal assist   Additional items Assist x 1;Verbal cues   Distance 120'x1 RW   Stair Management Assistance Not tested   Ambulation/Elevation Additional Comments pt required VC's for RW management and hand placement while ambulating as pt remover RUE off RW   Balance   Static Sitting Fair +   Dynamic Sitting Fair   Static Standing Fair -   Dynamic Standing Poor +   Ambulatory Poor +   Endurance Deficit   Endurance Deficit Yes   Endurance Deficit Description limited ambulation distance due to fatigue   Activity Tolerance   Activity Tolerance Patient limited by fatigue   Nurse Made Aware Spoke to RN   Exercises   Heelslides Supine;10 reps;AROM; Bilateral   Knee AROM Long Arc Quad Sitting;15 reps;AROM   Ankle Pumps Sitting;20 reps;AROM; Bilateral   Marching 10 reps;AROM; Bilateral   Assessment   Prognosis Fair   Problem List Decreased strength; Impaired balance;Decreased mobility; Decreased cognition; Impaired judgement;Decreased safety awareness;Decreased skin integrity   Assessment pt began tx session lying supine in the bed and was agreeable to participate in PT intervention  pt continues to remain consistant with /s for all bed mobility as pt required VC's to use bed rails to assista in helping complete transfers to seated EOB  Once seated EOB pt completed TE activities w/o LOB for 6 minutes in order to increase static/dynamic sitting balance  pt continues to remain consistant with min Ax1 for all functional transfers to and from RW with VC's for hand placement while ascending to rW and descending back to seated in recliner  pt continues to be limited with funcitonal mobility as pt was able to increase ambulation distance compared to previous tx session but was limited today due to fatigue and degradation of gait and slight LOB  pt would benefit from continued skilled PT intervention in order to increase activity tolerance and ambulation distance  post tx pt in bed with call bell and all pt needs met   Goals   Patient Goals to get back to bed   STG Expiration Date 09/20/22   PT Treatment Day 2   Plan   Treatment/Interventions Functional transfer training;LE strengthening/ROM; Therapeutic exercise; Endurance training;Cognitive reorientation;Patient/family training;Equipment eval/education; Bed mobility;Gait training;Spoke to nursing   Progress Progressing toward goals   PT Frequency 3-5x/wk   Recommendation   PT Discharge Recommendation Post acute rehabilitation services  (vs return to facility with PT services)   Equipment Recommended Donald walker   Change/add to QUALIA (formerly known as LocalResponse)? No   AM-PAC Basic Mobility Inpatient   Turning in Bed Without Bedrails 3   Lying on Back to Sitting on Edge of Flat Bed 3   Moving Bed to Chair 3   Standing Up From Chair 3   Walk in Room 3   Climb 3-5 Stairs 1   Basic Mobility Inpatient Raw Score 16   Basic Mobility Standardized Score 38 32   Highest Level Of Mobility   -HL Goal 5: Stand one or more mins   -HLM Achieved 7: Walk 25 feet or more   Education   Education Provided Mobility training;Assistive device   Patient Demonstrates acceptance/verbal understanding   End of Consult   Patient Position at End of Consult Supine; All needs within reach   The patient's AM-PAC Basic Mobility Inpatient Short Form Raw Score is 16  A Raw score of less than or equal to 16 suggests the patient may benefit from discharge to post-acute rehabilitation services  Please also refer to the recommendation of the Physical Therapist for safe discharge planning       Mirella Rogers PTA

## 2022-09-13 NOTE — ASSESSMENT & PLAN NOTE
· [facility] for worsening dementia with agitated features  · Patient has no idea why he is here, unable to give any pertinent history  · Per patient's daughter facility told her he had an abscess in his buttocks which required evaluation  · Continue Seroquel  Zyprexa p r n  [place]   · Restraints removed 09/12  · PT/OT - awaiting rehab  · He is from [facility], they can take him back after he comes back from rehab

## 2022-09-14 PROCEDURE — 99232 SBSQ HOSP IP/OBS MODERATE 35: CPT | Performed by: PHYSICIAN ASSISTANT

## 2022-09-14 RX ADMIN — DONEPEZIL HYDROCHLORIDE 10 MG: 5 TABLET ORAL at 21:11

## 2022-09-14 RX ADMIN — PREDNISONE 10 MG: 10 TABLET ORAL at 07:56

## 2022-09-14 RX ADMIN — CLOPIDOGREL BISULFATE 75 MG: 75 TABLET ORAL at 07:56

## 2022-09-14 RX ADMIN — ENOXAPARIN SODIUM 40 MG: 40 INJECTION SUBCUTANEOUS at 07:56

## 2022-09-14 RX ADMIN — AMLODIPINE BESYLATE 5 MG: 5 TABLET ORAL at 07:56

## 2022-09-14 RX ADMIN — QUETIAPINE FUMARATE 100 MG: 100 TABLET ORAL at 21:11

## 2022-09-14 RX ADMIN — CITALOPRAM HYDROBROMIDE 20 MG: 20 TABLET ORAL at 07:56

## 2022-09-14 RX ADMIN — GABAPENTIN 100 MG: 100 CAPSULE ORAL at 07:56

## 2022-09-14 RX ADMIN — GABAPENTIN 100 MG: 100 CAPSULE ORAL at 17:02

## 2022-09-14 NOTE — WOUND OSTOMY CARE
Progress Note - Wound   Akash Dumont 80 y o  male MRN: [id number]  Unit/Bed#: W -01 Encounter: [id number]      Assessment: This is a consult for new wounds to right lateral ankle  Patient admitted with Alzheimer disease with behavioral disturbance with history of dementia, sacral wound, HTN, anxiety, and RA  Patient seen David Cook in reclining chair sitting on EHOB cushion with legs down - BLE's are edematous  Patient is alert and oriented x 2, and is cooperative for the assessment and able to follow commands  Patient is incontinent of bowel and bladder per nursing flow-sheets  Patient is totally dependent upon nursing staff for all of his care  Plan:   1  Apply hydraguard to sacrum, b/l posterior thighs and hips, L buttocks, and b/l heels BID and PRN for prevention and protection  2  Apply skin nourishing cream the entire skin daily for moisture  3  Turn and reposition patient every  2 hours   4  Elevate heels off of bed with pillows to offload pressure   5  Apply EHOB waffle cushion to chair when OOB, if able  6  Cleanse R buttock wound with NSS, pat dry, and apply TRIAD paste to the wound bed  Cover with small bordered Allevyn foam dressing  Renan dressing with T  Change every other day and as needed for soilage/dislodgement  If patient soils dressing too often (greater than once per shift) please remove the dressing and use TRIAD paste BID and PRN to the wound bed  7 Cleanse wounds to right lateral ankle with NSS & pat dry  Open small Dermagran square & apply to wounds in a single layer cut to size of wounds to prevent maceration  Cover with small Allevyn bordered foam dressings  Change every other day & prn soilage/dislodgement  8  Follow-up with the [facility] wound care center as an outpatient - please call [phone] for an appointment  (Ambulatory referral placed)          Wound 09/13/22 Foot Right;Lateral (Active)   Wound Image   09/14/22 1425   Wound Description Beefy red;Granulation tissue; Hypergranulation;Slough; Yellow 09/14/22 1425   Jennifer-wound Assessment Excoriated 09/14/22 1425   Wound Length (cm) 1 5 cm 09/14/22 1425   Wound Width (cm) 1 2 cm 09/14/22 1425   Wound Depth (cm) 0 1 cm 09/14/22 1425   Wound Surface Area (cm^2) 1 8 cm^2 09/14/22 1425   Wound Volume (cm^3) 0 18 cm^3 09/14/22 1425   Calculated Wound Volume (cm^3) 0 18 cm^3 09/14/22 1425   Drainage Amount Scant 09/14/22 1425   Drainage Description Serous 09/14/22 1425   Non-staged Wound Description Full thickness 09/14/22 1425   Treatments Cleansed 09/14/22 1425   Dressing Dermagran gauze; Foam, Silicon (eg  Allevyn, etc) 09/14/22 1425   Wound packed? No 09/14/22 1425   Dressing Changed Changed 09/14/22 1425   Patient Tolerance Tolerated well 09/14/22 1100   Dressing Status Clean;Dry; Intact 09/14/22 1425       Wound 09/14/22 Ankle Right;Lateral (Active)   Wound Image   09/14/22 1428   Wound Description Beefy red;Granulation tissue;Slough; Yellow 09/14/22 1428   Jennifer-wound Assessment Excoriated 09/14/22 1428   Wound Length (cm) 1 cm 09/14/22 1428   Wound Width (cm) 1 2 cm 09/14/22 1428   Wound Depth (cm) 0 1 cm 09/14/22 1428   Wound Surface Area (cm^2) 1 2 cm^2 09/14/22 1428   Wound Volume (cm^3) 0 12 cm^3 09/14/22 1428   Calculated Wound Volume (cm^3) 0 12 cm^3 09/14/22 1428   Drainage Amount Scant 09/14/22 1428   Drainage Description Serous 09/14/22 1428   Non-staged Wound Description Full thickness 09/14/22 1428   Treatments Cleansed 09/14/22 1428   Dressing Dermagran gauze; Foam, Silicon (eg  Allevyn, etc) 09/14/22 1428   Wound packed? No 09/14/22 1428   Dressing Changed Changed 09/14/22 1428   Dressing Status Clean;Dry; Intact 09/14/22 1428     Discussed assessment findings, and plan of care/recommendations with Shelly BOYD  Wound care will follow along with patient throughout admission, please call or tiger text with questions and concerns  Recommendations written as orders    Norm Criss RN, BSN, [employer]

## 2022-09-14 NOTE — CASE MANAGEMENT
Case Management Progress Note    Patient name Yolie New  Location W /W [address] 789-91 MRN [id number]  : 1931 Date 2022       LOS (days): 5  Geometric Mean LOS (GMLOS) (days): 4 50  Days to GMLOS:-0 5        OBJECTIVE:        Current admission status: Inpatient  [facility]   [facility] [address] [address]  Phone: [phone] Fax: [phone]    [id number] [place], [address]  [place]  [address] [place] [address]  Phone: [phone] Fax: [phone]    Primary Care Provider: No primary care provider on file  Primary Insurance: [facility]  Secondary Insurance:     PROGRESS NOTE:      CM was contacted by the daughter, Asmita Nice, to discuss [facility] application process and [facility] ability to accept pt back to facility  Asmita Nice will print out the [facility] application and submit to their facility  CM relayed to Asmita Nice her dad's ambulation status and ability status per the PT notes and at his baseline to return to the facility with services  CM will follow up with Adrianna and [address] per phone call  CM called Adrianna at [facility]: [phone] and was forwarded the admissions office  CM LVM with Negar Friedman for a return call  CM called [address] @ P: [phone] press #1 for admissions  CM LVM with Rachell Austin for a return call  CM called Rachell Austin back and Rachell Austin said she may have a bed available for him tomorrow, if he is vaccinated  Pt was vaccinated yesterday with the booster  Rachell Austin will call CM back if bed is available for tomorrow  CM was called by Gutierrez Landeros, from [facility] inquiring about the pt  CM relayed answers to Gutierrez Landeros about pt's behavior's and compliance with PT/OT  Gutierrez Landeros relayed he would speak with his DON and reach back out to CM if a bed was available  CM was called back by [facility], Gutierrez Landeros, who offered a bed for the pt    Gutierrez Landeros is reaching out to daughter, Asmita Nice, [address] will follow-up

## 2022-09-14 NOTE — PROGRESS NOTES
[facility]  Progress Note - Lydia Dale 9/7/1931, 80 y o  male MRN: [id number]  Unit/Bed#: W -01 Encounter: [id number]  Primary Care Provider: No primary care provider on file  Date and time admitted to hospital: 9/8/2022  3:17 PM    * Late onset Alzheimer's disease with behavioral disturbance [facility])  Assessment & Plan  Patient transferred from current facility [facility] for worsening dementia with agitated features  Patient has no idea why he is here, unable to give any pertinent history  Per patient's daughter, facility told her he had an abscess in his buttocks which required evaluation  · Continue Seroquel  Zyprexa p r n  Cathleharis Cope · Restraints removed 09/12, behavior has been stable  · PT/OT - awaiting rehab placement  · He is from [facility], per case management unsure if they can take him back     Pressure injury of sacral region, stage 2 [facility])  Assessment & Plan  · Wound care nurse consult, continue supportive care  · Does not appear infected  · Offloading and local wound care    Primary hypertension  Assessment & Plan  · Patient has been on Lopressor 50 mg daily prior to admission, this was decreased to 25 mg however still with sinus bradycardia  · D/C Lopressor in favor of amlodipine 5 mg daily  · BP and HR reviewed, stable    Rheumatoid arthritis with positive rheumatoid factor (HCC)  Assessment & Plan  · On chronic steroids, will continue  · Follow-up with rheumatology outpatient      VTE Pharmacologic Prophylaxis: VTE Score: 4 Moderate Risk (Score 3-4) - Pharmacological DVT Prophylaxis Ordered: enoxaparin (Lovenox)  Patient Centered Rounds: I performed bedside rounds with nursing staff today  Discussions with Specialists or Other Care Team Provider:  Case management    Education and Discussions with Family / Patient: Attempted to update  (daughter) via phone  Left voicemail  Time Spent for Care: 30 minutes   More than 50% of total time spent on counseling and coordination of care as described above  Current Length of Stay: 5 day(s)  Current Patient Status: Inpatient   Certification Statement: The patient will continue to require additional inpatient hospital stay due to Pending rehab bed availability  Discharge Plan: Anticipate discharge later today or tomorrow to rehab facility  Code Status: Level 1 - Full Code    Subjective:   Patient is seen at bedside this a m , no acute complaints at this time, pleasantly confused, appears to be at baseline, alert and oriented to self and location  Objective:     Vitals:   Temp (24hrs), Av °F (36 7 °C), Min:97 1 °F (36 2 °C), Max:98 8 °F (37 1 °C)    Temp:  [97 1 °F (36 2 °C)-98 8 °F (37 1 °C)] 98 1 °F (36 7 °C)  HR:  [56-60] 60  Resp:  [16-18] 18  BP: (117-149)/(58-61) 117/58  SpO2:  [94 %-99 %] 99 %  There is no height or weight on file to calculate BMI  Input and Output Summary (last 24 hours): Intake/Output Summary (Last 24 hours) at 2022 1011  Last data filed at 2022 0529  Gross per 24 hour   Intake 1200 ml   Output --   Net 1200 ml       Physical Exam:   Physical Exam  Constitutional:       General: He is not in acute distress  Appearance: He is not ill-appearing, toxic-appearing or diaphoretic  Cardiovascular:      Rate and Rhythm: Normal rate and regular rhythm  Pulses: Normal pulses  Heart sounds: Normal heart sounds  Pulmonary:      Effort: Pulmonary effort is normal  No respiratory distress  Breath sounds: Normal breath sounds  Abdominal:      General: Bowel sounds are normal  There is no distension  Palpations: Abdomen is soft  Tenderness: There is no abdominal tenderness  Musculoskeletal:         General: No swelling or tenderness  Skin:     General: Skin is warm  Neurological:      General: No focal deficit present  Mental Status: He is alert        Comments: Alert, oriented to self and location but not to time or medical situation  Psychiatric:         Mood and Affect: Mood normal          Behavior: Behavior normal           Additional Data:     Labs:  Results from last 7 days   Lab Units 09/08/22  1632   WBC Thousand/uL 7 02   HEMOGLOBIN g/dL 12 1   HEMATOCRIT % 37 9   PLATELETS Thousands/uL 177   NEUTROS PCT % 70   LYMPHS PCT % 20   MONOS PCT % 8   EOS PCT % 1     Results from last 7 days   Lab Units 09/08/22  1632   SODIUM mmol/L 142   POTASSIUM mmol/L 5 0   CHLORIDE mmol/L 109*   CO2 mmol/L 27   BUN mg/dL 39*   CREATININE mg/dL 1 72*   ANION GAP mmol/L 6   CALCIUM mg/dL 8 5   ALBUMIN g/dL 3 5   TOTAL BILIRUBIN mg/dL 0 38   ALK PHOS U/L 70   ALT U/L 5*   AST U/L 18   GLUCOSE RANDOM mg/dL 114                       Lines/Drains:  Invasive Devices  Report    None                   Imaging: No pertinent imaging reviewed  Recent Cultures (last 7 days):         Last 24 Hours Medication List:   Current Facility-Administered Medications   Medication Dose Route Frequency Provider Last Rate    acetaminophen  650 mg Oral Q6H PRN Patricia Steinberg MD      aluminum-magnesium hydroxide-simethicone  30 mL Oral Q6H PRN Patricia Steinberg MD      amLODIPine  5 mg Oral Daily Liam Mahan PA-C      citalopram  20 mg Oral Daily Patricia Steinberg MD      clopidogrel  75 mg Oral Daily Patricia Steinberg MD      donepezil  10 mg Oral HS Patricia Steinberg MD      enoxaparin  40 mg Subcutaneous Daily Patricia Steinberg MD      gabapentin  100 mg Oral BID Patricia Steinberg MD      OLANZapine  5 mg Oral Q3H PRN Liam Mahan PA-C      ondansetron  4 mg Intravenous Q6H PRN Patricia Steinberg MD      polyethylene glycol  17 g Oral Daily PRN Patricia Steinberg MD      predniSONE  10 mg Oral Daily Patricia Steinberg MD      QUEtiapine  100 mg Oral HS Patricia Steinberg MD          Today, Patient Was Seen By: Bobo Mar PA-C    **Please Note: This note may have been constructed using a voice recognition system  **

## 2022-09-14 NOTE — DISCHARGE SUMMARY
[facility]  Discharge- Clement De La O 9/7/1931, 80 y o  male MRN: [id number]  Unit/Bed#: W -01 Encounter: [id number]  Primary Care Provider: No primary care provider on file  Date and time admitted to hospital: 9/8/2022  3:17 PM    No new Assessment & Plan notes have been filed under this hospital service since the last note was generated  Service: Hospitalist      Medical Problems             Resolved Problems  Date Reviewed: 9/14/2022   None               Discharging Physician / Practitioner: Venu Almonte PA-C  PCP: No primary care provider on file  Admission Date:   Admission Orders (From admission, onward)     Ordered        09/09/22 1053  Inpatient Admission  Once            09/08/22 1852  Place in Observation  Once                      Discharge Date: 09/14/22    Consultations During Hospital Stay:  · PT/OT  · Wound care    Procedures Performed:   · None    Significant Findings / Test Results:   No orders to display   ·     Incidental Findings:   · None     Test Results Pending at Discharge (will require follow up): · None     Outpatient Tests Requested:  · Follow-up with rheumatology outpatient  · Follow-up with wound care outpatient  · Follow-up with PCP outpatient    Complications:  None    Reason for Admission:  Alzheimer's with behavioral disturbance    Hospital Course:   Clement De La O is a 80 y o  male patient, PMH rheumatoid arthritis, HTN, pressure injury of sacrum, Alzheimer's disease, who originally presented to the hospital on 9/8/2022 due to worsening dementia  Patient was brought in from prior facility [facility] for evaluation, has had worsening behavior and agitation  Per discussion with ED, patient's prior facility stated they were unable to care for him, however facility was unreachable on admission    Otherwise patient 's initial labs/workup was stable, admitted for placement with case management consult    During admission, patient's behavior had improved, had required restraints for safety/impulsivity but were removed and have been off since 9/12***  Wound care was following patient for known pressure injury of sacrum, did not appear infected, can continue wound care and follow-up outpatient  PT/OT had evaluated patient, recommending rehab placement  Case management was on board for discharge planning, unfortunately prior facility was unable to accept patient back, accepted at *** for rehab  Please see above list of diagnoses and related plan for additional information  Condition at Discharge: {Condition:08227}    Discharge Day Visit / Exam:   Subjective:  ***  Vitals: Blood Pressure: 133/64 (09/14/22 1521)  Pulse: 57 (09/14/22 1521)  Temperature: 98 8 °F (37 1 °C) (09/14/22 1521)  Temp Source: Oral (09/14/22 1521)  Respirations: 16 (09/14/22 1521)  SpO2: 96 % (09/14/22 1521)  Exam:   Physical Exam ***    Discussion with Family: {Family Communication:76696}    Discharge instructions/Information to patient and family:   See after visit summary for information provided to patient and family  Provisions for Follow-Up Care:  See after visit summary for information related to follow-up care and any pertinent home health orders  Disposition:   {Disposition on Discharge:16038}    Planned Readmission: ***     Discharge Statement:  I spent *** minutes discharging the patient  This time was spent on the day of discharge  I had direct contact with the patient on the day of discharge  Greater than 50% of the total time was spent examining patient, answering all patient questions, arranging and discussing plan of care with patient as well as directly providing post-discharge instructions  Additional time then spent on discharge activities  Discharge Medications:  See after visit summary for reconciled discharge medications provided to patient and/or family        **Please Note: This note may have been constructed using a voice recognition system**

## 2022-09-14 NOTE — PLAN OF CARE
Problem: INFECTION - ADULT  Goal: Absence or prevention of progression during hospitalization  Description: INTERVENTIONS:  - Assess and monitor for signs and symptoms of infection  - Monitor lab/diagnostic results  - Monitor all insertion sites, i e  indwelling lines, tubes, and drains  - Monitor endotracheal if appropriate and nasal secretions for changes in amount and color  - [place] appropriate cooling/warming therapies per order  - Administer medications as ordered  - Instruct and encourage patient and family to use good hand hygiene technique  - Identify and instruct in appropriate isolation precautions for identified infection/condition  Outcome: Progressing  Goal: Absence of fever/infection during neutropenic period  Description: INTERVENTIONS:  - Monitor WBC    Outcome: Progressing     Problem: SAFETY ADULT  Goal: Patient will remain free of falls  Description: INTERVENTIONS:  - Educate patient/family on patient safety including physical limitations  - Instruct patient to call for assistance with activity   - Consult OT/PT to assist with strengthening/mobility   - Keep Call bell within reach  - Keep bed low and locked with side rails adjusted as appropriate  - Keep care items and personal belongings within reach  - Initiate and maintain comfort rounds  - Make Fall Risk Sign visible to staff  - Offer Toileting every 2 Hours, in advance of need  - Initiate/Maintain bed/chair alarm  - Obtain necessary fall risk management equipment: walker  - Apply yellow socks and bracelet for high fall risk patients  - Consider moving patient to room near nurses station  Outcome: Progressing  Goal: Maintain or return to baseline ADL function  Description: INTERVENTIONS:  -  Assess patient's ability to carry out ADLs; assess patient's baseline for ADL function and identify physical deficits which impact ability to perform ADLs (bathing, care of mouth/teeth, toileting, grooming, dressing, etc )  - Assess/evaluate cause of self-care deficits   - Assess range of motion  - Assess patient's mobility; develop plan if impaired  - Assess patient's need for assistive devices and provide as appropriate  - Encourage maximum independence but intervene and supervise when necessary  - Involve family in performance of ADLs  - Assess for home care needs following discharge   - Consider OT consult to assist with ADL evaluation and planning for discharge  - Provide patient education as appropriate  Outcome: Progressing  Goal: Maintains/Returns to pre admission functional level  Description: INTERVENTIONS:  - Perform BMAT or MOVE assessment daily    - Set and communicate daily mobility goal to care team and patient/family/caregiver     - Collaborate with rehabilitation services on mobility goals if consulted  - Stand patient 6 times a day  - Ambulate patient 4 times a day  - Out of bed to chair 3 times a day   - Out of bed for meals 3 times a day  - Out of bed for toileting  - Record patient progress and toleration of activity level   Outcome: Progressing     Problem: DISCHARGE PLANNING  Goal: Discharge to home or other facility with appropriate resources  Description: INTERVENTIONS:  - Identify barriers to discharge w/patient and caregiver  - Arrange for needed discharge resources and transportation as appropriate  - Identify discharge learning needs (meds, wound care, etc )  - Arrange for interpretive services to assist at discharge as needed  - Refer to Case Management Department for coordinating discharge planning if the patient needs post-hospital services based on physician/advanced practitioner order or complex needs related to functional status, cognitive ability, or social support system  Outcome: Progressing     Problem: Knowledge Deficit  Goal: Patient/family/caregiver demonstrates understanding of disease process, treatment plan, medications, and discharge instructions  Description: Complete learning assessment and assess knowledge base   Interventions:  - Provide teaching at level of understanding  - Provide teaching via preferred learning methods  Outcome: Progressing     Problem: Prexisting or High Potential for Compromised Skin Integrity  Goal: Skin integrity is maintained or improved  Description: INTERVENTIONS:  - Identify patients at risk for skin breakdown  - Assess and monitor skin integrity  - Assess and monitor nutrition and hydration status  - Monitor labs   - Assess for incontinence   - Turn and reposition patient  - Assist with mobility/ambulation  - Relieve pressure over bony prominences  - Avoid friction and shearing  - Provide appropriate hygiene as needed including keeping skin clean and dry  - Evaluate need for skin moisturizer/barrier cream  - Collaborate with interdisciplinary team   - Patient/family teaching  - Consider wound care consult   Outcome: Progressing     Problem: Potential for Falls  Goal: Patient will remain free of falls  Description: INTERVENTIONS:  - Educate patient/family on patient safety including physical limitations  - Instruct patient to call for assistance with activity   - Consult OT/PT to assist with strengthening/mobility   - Keep Call bell within reach  - Keep bed low and locked with side rails adjusted as appropriate  - Keep care items and personal belongings within reach  - Initiate and maintain comfort rounds  - Make Fall Risk Sign visible to staff  - Offer Toileting every 2 Hours, in advance of need  - Initiate/Maintain bed/chair alarm  - Obtain necessary fall risk management equipment: walker  - Apply yellow socks and bracelet for high fall risk patients  - Consider moving patient to room near nurses station  Outcome: Progressing     Problem: MOBILITY - ADULT  Goal: Maintain or return to baseline ADL function  Description: INTERVENTIONS:  -  Assess patient's ability to carry out ADLs; assess patient's baseline for ADL function and identify physical deficits which impact ability to perform ADLs (bathing, care of mouth/teeth, toileting, grooming, dressing, etc )  - Assess/evaluate cause of self-care deficits   - Assess range of motion  - Assess patient's mobility; develop plan if impaired  - Assess patient's need for assistive devices and provide as appropriate  - Encourage maximum independence but intervene and supervise when necessary  - Involve family in performance of ADLs  - Assess for home care needs following discharge   - Consider OT consult to assist with ADL evaluation and planning for discharge  - Provide patient education as appropriate  Outcome: Progressing  Goal: Maintains/Returns to pre admission functional level  Description: INTERVENTIONS:  - Perform BMAT or MOVE assessment daily    - Set and communicate daily mobility goal to care team and patient/family/caregiver  - Collaborate with rehabilitation services on mobility goals if consulted  - Stand patient 6 times a day  - Ambulate patient 4 times a day  - Out of bed to chair 3 times a day   - Out of bed for meals 3 times a day  - Out of bed for toileting  - Record patient progress and toleration of activity level   Outcome: Progressing     Problem: Nutrition/Hydration-ADULT  Goal: Nutrient/Hydration intake appropriate for improving, restoring or maintaining nutritional needs  Description: Monitor and assess patient's nutrition/hydration status for malnutrition  Collaborate with interdisciplinary team and initiate plan and interventions as ordered  Monitor patient's weight and dietary intake as ordered or per policy  Utilize nutrition screening tool and intervene as necessary  Determine patient's food preferences and provide high-protein, high-caloric foods as appropriate       INTERVENTIONS:  - Monitor oral intake, urinary output, labs, and treatment plans  - Assess nutrition and hydration status and recommend course of action  - Evaluate amount of meals eaten  - Assist patient with eating if necessary   - Allow adequate time for meals  - Recommend/ encourage appropriate diets, oral nutritional supplements, and vitamin/mineral supplements  - Order, calculate, and assess calorie counts as needed  - Recommend, monitor, and adjust tube feedings and TPN/PPN based on assessed needs  - Assess need for intravenous fluids  - Provide specific nutrition/hydration education as appropriate  - Include patient/family/caregiver in decisions related to nutrition  Outcome: Progressing

## 2022-09-14 NOTE — ASSESSMENT & PLAN NOTE
· Patient has been on Lopressor 50 mg daily prior to admission, this was decreased to 25 mg however still with sinus bradycardia  · D/C Lopressor in favor of amlodipine 5 mg daily    · BP and HR reviewed, stable

## 2022-09-14 NOTE — ASSESSMENT & PLAN NOTE
[facility] with agitated features  Patient has no idea why he is here, unable to give any pertinent history  Per patient's daughter, facility told her he had an abscess in his buttocks which required evaluation  · Continue Seroquel  Sharla Burnham   · Restraints removed 09/12, behavior has been stable  · PT/OT - awaiting rehab placement  · He is from [facility], per case management unsure if they can take him back

## 2022-09-14 NOTE — PLAN OF CARE
Problem: INFECTION - ADULT  Goal: Absence or prevention of progression during hospitalization  Description: INTERVENTIONS:  - Assess and monitor for signs and symptoms of infection  - Monitor lab/diagnostic results  - Monitor all insertion sites, i e  indwelling lines, tubes, and drains  - Monitor endotracheal if appropriate and nasal secretions for changes in amount and color  - [place] appropriate cooling/warming therapies per order  - Administer medications as ordered  - Instruct and encourage patient and family to use good hand hygiene technique  - Identify and instruct in appropriate isolation precautions for identified infection/condition  Outcome: Progressing  Goal: Absence of fever/infection during neutropenic period  Description: INTERVENTIONS:  - Monitor WBC    Outcome: Progressing     Problem: SAFETY ADULT  Goal: Patient will remain free of falls  Description: INTERVENTIONS:  - Educate patient/family on patient safety including physical limitations  - Instruct patient to call for assistance with activity   - Consult OT/PT to assist with strengthening/mobility   - Keep Call bell within reach  - Keep bed low and locked with side rails adjusted as appropriate  - Keep care items and personal belongings within reach  - Initiate and maintain comfort rounds  - Make Fall Risk Sign visible to staff  - Offer Toileting every 2 Hours, in advance of need  - Initiate/Maintain bed/chair alarm  - Obtain necessary fall risk management equipment: walker  - Apply yellow socks and bracelet for high fall risk patients  - Consider moving patient to room near nurses station  Outcome: Progressing  Goal: Maintain or return to baseline ADL function  Description: INTERVENTIONS:  -  Assess patient's ability to carry out ADLs; assess patient's baseline for ADL function and identify physical deficits which impact ability to perform ADLs (bathing, care of mouth/teeth, toileting, grooming, dressing, etc )  - Assess/evaluate cause of self-care deficits   - Assess range of motion  - Assess patient's mobility; develop plan if impaired  - Assess patient's need for assistive devices and provide as appropriate  - Encourage maximum independence but intervene and supervise when necessary  - Involve family in performance of ADLs  - Assess for home care needs following discharge   - Consider OT consult to assist with ADL evaluation and planning for discharge  - Provide patient education as appropriate  Outcome: Progressing  Goal: Maintains/Returns to pre admission functional level  Description: INTERVENTIONS:  - Perform BMAT or MOVE assessment daily    - Set and communicate daily mobility goal to care team and patient/family/caregiver     - Collaborate with rehabilitation services on mobility goals if consulted  - Stand patient 6 times a day  - Ambulate patient 4 times a day  - Out of bed to chair 3 times a day   - Out of bed for meals 3 times a day  - Out of bed for toileting  - Record patient progress and toleration of activity level   Outcome: Progressing     Problem: DISCHARGE PLANNING  Goal: Discharge to home or other facility with appropriate resources  Description: INTERVENTIONS:  - Identify barriers to discharge w/patient and caregiver  - Arrange for needed discharge resources and transportation as appropriate  - Identify discharge learning needs (meds, wound care, etc )  - Arrange for interpretive services to assist at discharge as needed  - Refer to Case Management Department for coordinating discharge planning if the patient needs post-hospital services based on physician/advanced practitioner order or complex needs related to functional status, cognitive ability, or social support system  Outcome: Progressing     Problem: Knowledge Deficit  Goal: Patient/family/caregiver demonstrates understanding of disease process, treatment plan, medications, and discharge instructions  Description: Complete learning assessment and assess knowledge base   Interventions:  - Provide teaching at level of understanding  - Provide teaching via preferred learning methods  Outcome: Progressing     Problem: Prexisting or High Potential for Compromised Skin Integrity  Goal: Skin integrity is maintained or improved  Description: INTERVENTIONS:  - Identify patients at risk for skin breakdown  - Assess and monitor skin integrity  - Assess and monitor nutrition and hydration status  - Monitor labs   - Assess for incontinence   - Turn and reposition patient  - Assist with mobility/ambulation  - Relieve pressure over bony prominences  - Avoid friction and shearing  - Provide appropriate hygiene as needed including keeping skin clean and dry  - Evaluate need for skin moisturizer/barrier cream  - Collaborate with interdisciplinary team   - Patient/family teaching  - Consider wound care consult   Outcome: Progressing     Problem: Potential for Falls  Goal: Patient will remain free of falls  Description: INTERVENTIONS:  - Educate patient/family on patient safety including physical limitations  - Instruct patient to call for assistance with activity   - Consult OT/PT to assist with strengthening/mobility   - Keep Call bell within reach  - Keep bed low and locked with side rails adjusted as appropriate  - Keep care items and personal belongings within reach  - Initiate and maintain comfort rounds  - Make Fall Risk Sign visible to staff  - Offer Toileting every 2 Hours, in advance of need  - Initiate/Maintain bed/chair alarm  - Obtain necessary fall risk management equipment: walker  - Apply yellow socks and bracelet for high fall risk patients  - Consider moving patient to room near nurses station  Outcome: Progressing     Problem: MOBILITY - ADULT  Goal: Maintain or return to baseline ADL function  Description: INTERVENTIONS:  -  Assess patient's ability to carry out ADLs; assess patient's baseline for ADL function and identify physical deficits which impact ability to perform ADLs (bathing, care of mouth/teeth, toileting, grooming, dressing, etc )  - Assess/evaluate cause of self-care deficits   - Assess range of motion  - Assess patient's mobility; develop plan if impaired  - Assess patient's need for assistive devices and provide as appropriate  - Encourage maximum independence but intervene and supervise when necessary  - Involve family in performance of ADLs  - Assess for home care needs following discharge   - Consider OT consult to assist with ADL evaluation and planning for discharge  - Provide patient education as appropriate  Outcome: Progressing  Goal: Maintains/Returns to pre admission functional level  Description: INTERVENTIONS:  - Perform BMAT or MOVE assessment daily    - Set and communicate daily mobility goal to care team and patient/family/caregiver  - Collaborate with rehabilitation services on mobility goals if consulted  - Stand patient 6 times a day  - Ambulate patient 4 times a day  - Out of bed to chair 3 times a day   - Out of bed for meals 3 times a day  - Out of bed for toileting  - Record patient progress and toleration of activity level   Outcome: Progressing     Problem: Nutrition/Hydration-ADULT  Goal: Nutrient/Hydration intake appropriate for improving, restoring or maintaining nutritional needs  Description: Monitor and assess patient's nutrition/hydration status for malnutrition  Collaborate with interdisciplinary team and initiate plan and interventions as ordered  Monitor patient's weight and dietary intake as ordered or per policy  Utilize nutrition screening tool and intervene as necessary  Determine patient's food preferences and provide high-protein, high-caloric foods as appropriate       INTERVENTIONS:  - Monitor oral intake, urinary output, labs, and treatment plans  - Assess nutrition and hydration status and recommend course of action  - Evaluate amount of meals eaten  - Assist patient with eating if necessary   - Allow adequate time for meals  - Recommend/ encourage appropriate diets, oral nutritional supplements, and vitamin/mineral supplements  - Order, calculate, and assess calorie counts as needed  - Recommend, monitor, and adjust tube feedings and TPN/PPN based on assessed needs  - Assess need for intravenous fluids  - Provide specific nutrition/hydration education as appropriate  - Include patient/family/caregiver in decisions related to nutrition  Outcome: Progressing

## 2022-09-14 NOTE — CASE MANAGEMENT
Case Management Assessment & Discharge Planning Note    Patient name María Mathias  Location W /W [address] 725-26 MRN [id number]  : 1931 Date 2022       Current Admission Date: 2022  Current Admission Diagnosis:Late onset Alzheimer's disease with behavioral disturbance [facility])   Patient Active Problem List    Diagnosis Date Noted    Pressure injury of sacral region, stage 2 ([facility] ) 2022    Buttock wound 2022    Positive blood cultures 2022    UTI (urinary tract infection) 2022    Primary hypertension 10/24/2021    PVD (peripheral vascular disease) ([facility] ) 10/24/2021    [facility]'s [facility]  ) [facility]    Benign prostatic hyperplasia with post-void dribbling 2021    Rheumatoid arthritis with positive rheumatoid factor ([facility] ) 2021    Agitation due to dementia ([facility] ) 2021      LOS (days): 5  Geometric Mean LOS (GMLOS) (days): 4 50  Days to GMLOS:-0 6     OBJECTIVE:  PATIENT READMITTED TO HOSPITAL  Risk of Unplanned Readmission Score: 19 35         Current admission status: Inpatient       Preferred Pharmacy:   [address]  Phone: [phone] Fax: [phone]    [facility]/pharmacy #[id number]- [address] [address]  Phone: [phone] Fax: [phone]    Primary Care Provider: No primary care provider on file      Primary Insurance: Kylee Horner [facility] HOSPITAL REP  Secondary Insurance:     ASSESSMENT:  [address] Representative - Daughter   Primary Phone: [phone] (Home)                                                                DISCHARGE DETAILS:    Discharge planning discussed with[de-identified] Daughter Noe Patel of Choice: Yes  Comments - Freedom of Choice: Negro Arriola wishes for her father to go to [facility] facility for the pt's rehabilitation  CM contacted family/caregiver?: Yes  Were Treatment Team discharge recommendations reviewed with patient/caregiver?: Yes  Did patient/caregiver verbalize understanding of patient care needs?: N/A- going to facility  Were patient/caregiver advised of the risks associated with not following Treatment Team discharge recommendations?: Yes    Contacts  Patient Contacts: Joya Moser  Relationship to Patient[de-identified] Family  Contact Method: Phone  Phone Number: [phone]  Reason/Outcome: Discharge Planning, Referral, Continuity of Care              Other Referral/Resources/Interventions Provided:  Interventions: Short Term Rehab  Referral Comments: CM accepted the referral via [address] per family's wishes  Facility tasked out for [facility] and will notify CM when obtained           Treatment Team Recommendation: Short Term Rehab  Discharge Destination Plan[de-identified] Short Term Rehab  Transport at Discharge : BLS Ambulance (d/t cognition)                                           Accepting Facility Name, [address] : [facility] at [place], [place]  Receiving Facility/Agency Phone Number: [phone]  Facility/Agency Fax Number: [phone]

## 2022-09-15 PROBLEM — Z59.9 INABILITY TO RETURN TO LIVING SITUATION: Status: ACTIVE | Noted: 2022-09-15

## 2022-09-15 PROCEDURE — 99232 SBSQ HOSP IP/OBS MODERATE 35: CPT | Performed by: NURSE PRACTITIONER

## 2022-09-15 PROCEDURE — 97535 SELF CARE MNGMENT TRAINING: CPT

## 2022-09-15 PROCEDURE — 97116 GAIT TRAINING THERAPY: CPT

## 2022-09-15 PROCEDURE — 97110 THERAPEUTIC EXERCISES: CPT

## 2022-09-15 RX ADMIN — CLOPIDOGREL BISULFATE 75 MG: 75 TABLET ORAL at 09:06

## 2022-09-15 RX ADMIN — OLANZAPINE 5 MG: 5 TABLET, ORALLY DISINTEGRATING ORAL at 01:34

## 2022-09-15 RX ADMIN — DONEPEZIL HYDROCHLORIDE 10 MG: 5 TABLET ORAL at 21:47

## 2022-09-15 RX ADMIN — QUETIAPINE FUMARATE 100 MG: 100 TABLET ORAL at 21:47

## 2022-09-15 RX ADMIN — ENOXAPARIN SODIUM 40 MG: 40 INJECTION SUBCUTANEOUS at 09:06

## 2022-09-15 RX ADMIN — GABAPENTIN 100 MG: 100 CAPSULE ORAL at 17:24

## 2022-09-15 RX ADMIN — CITALOPRAM HYDROBROMIDE 20 MG: 20 TABLET ORAL at 09:06

## 2022-09-15 RX ADMIN — PREDNISONE 10 MG: 10 TABLET ORAL at 09:06

## 2022-09-15 RX ADMIN — GABAPENTIN 100 MG: 100 CAPSULE ORAL at 09:06

## 2022-09-15 RX ADMIN — OLANZAPINE 5 MG: 5 TABLET, ORALLY DISINTEGRATING ORAL at 21:47

## 2022-09-15 NOTE — CASE MANAGEMENT
Case Management Discharge Planning Note    Patient name Murray Puri  Location [address] 191-17 MRN [id number]  : 1931 Date 9/15/2022       Current Admission Date: 2022  Current Admission Diagnosis:Late onset Alzheimer's disease with behavioral disturbance [facility])   Patient Active Problem List    Diagnosis Date Noted    Inability to return to living situation 09/15/2022    Pressure injury of sacral region, stage 2 ([facility] ) 2022    Buttock wound 2022    Positive blood cultures 2022    UTI (urinary tract infection) 2022    Primary hypertension 10/24/2021    [facility] ) 10/24/2021    Late onset Alzheimer's disease with behavioral disturbance ([place] [address] ) 2021    Benign prostatic hyperplasia with post-void dribbling 2021    Rheumatoid arthritis with positive rheumatoid factor ([place] [address] ) 2021    Agitation due to dementia ([facility] ) 2021      LOS (days): 6  Geometric Mean LOS (GMLOS) (days): 4 50  Days to GMLOS:-1 8     OBJECTIVE:  Risk of Unplanned Readmission Score: 19 67         Current admission status: Inpatient   Preferred Pharmacy:   [address], [address] [place] [id number]  Phone: [phone] Fax: [phone]    [facility]/pharmacy #[id number]- [address] [address]  Phone: [phone] Fax: [phone]    Primary Care Provider: No primary care provider on file      Primary Insurance: [facility]  Secondary Insurance:     DISCHARGE DETAILS:    Discharge planning discussed with[de-identified] SLIM, patient's daughters, [employer], and [facility] of Choice: Yes                   Contacts  Patient Contacts: Kathia Covarrubias and Deepa Ricardo  Relationship to Patient[de-identified] Family  Contact Method: Phone  Phone Number: [phone]  Reason/Outcome: Discharge Planning, Referral, Continuity of Care, Emergency [address]         Is the patient interested in [facility] at discharge?: No    DME Referral Provided  Referral made for DME?: No    Other Referral/Resources/Interventions Provided:  Interventions: Short Term Rehab  Referral Comments: [facility] notified CM that peer to peer denial was upheld and that family is able to complete an expedited appeal if they wanted to  Additional Comments: CM called [facility] and spoke with Charmaine Oakes to discuss the option of patient returning to them as STR prior authorization was denied to due patient walking too far  Charmaine Oakes took [employer] and had Harsha Holder call CM back  CM reviewed with Harsha Holder the above  She does not believe that they will be able to meet patient's needs and will need to discuss this with patient's daughters as well  CM asked if we could fax over updated PT/OT notes for them to review to give their final determination  Harsha Holder provided CM with the fax number [phone]  CM asked PT/OT to see patient for updated therapy notes and faxed these to Harsha Holder once they were available  CM then called patient's daughter Paty Thomason who confrenced in her sister Arturo Monahan  CM reviewed the above and she had stated that they had heard from [facility] already  They would like to look into MA facility placement as they are unable to take patient home with them and if [facility] is unable to take him back he would have no place to go  CM will send updated referrals out requesting patient be reviewed for MA LTC placement  While CM is working on level of determination with the [facility], family will complete the expedited appeal with Ninfa Burks  CM provided them with the appeal number [phone]  CM will work on level of determiantion paperwork and get this sent to Pending sale to [facility] for processing  CM department will continue to follow to assist with discharge coordination

## 2022-09-15 NOTE — PLAN OF CARE
Problem: OCCUPATIONAL THERAPY ADULT  Goal: Performs self-care activities at highest level of function for planned discharge setting  See evaluation for individualized goals  Description: Treatment Interventions: ADL retraining, Functional transfer training, Endurance training, Cognitive reorientation, Patient/family training, Equipment evaluation/education, Compensatory technique education, Continued evaluation, Energy conservation, Activityengagement          See flowsheet documentation for full assessment, interventions and recommendations  Outcome: Progressing  Note: Limitation: Decreased ADL status, Decreased Safe judgement during ADL, Decreased cognition, Decreased endurance, Decreased high-level ADLs, Decreased self-care trans  Prognosis: Good  Assessment: Pt seen on this date for skilled OT treatment session  At start of session pt supine in bed  Pt noted to be saturated in bed, when asked if pt was aware he was saturated pt stating "I know"  Pt requiring max encouragement for completion of hygiene and donning clean clothes  Increased assist for LB dressing due to pt insistent on donning while long sitting in bed  Pt retropulsive upon standing to don pants, requiring min A for standing balance  When attempting to return to sitting in recliner chair pt attempting to sit 2 ft in front of chair and requiring increased assist for safe sitting and positioning pt's buttocks onto chair  Pt educated extensively on backing up to chair prior to sitting  Pt continues to have limited functional endurance and requires VC for encouragement to participate  Continues to be limited by overall safety awareness, limited ADL performance and functional mobility  Pt would continue to benefit from skilled OT treatment sessions in order to address remaining deficits and continue to recommend d/c to rehab when medically stable       OT Discharge Recommendation: Post acute rehabilitation services [facility] pending level of assist Solange Cheema is able to provide)

## 2022-09-15 NOTE — PROGRESS NOTES
[facility]  Progress Note - Yen Moment 9/7/1931, 80 y o  male MRN: [id number]  Unit/Bed#: W -01 Encounter: [id number]  Primary Care Provider: No primary care provider on file  Date and time admitted to hospital: 9/8/2022  3:17 PM    Inability to return to living situation  [address] declined insurance authorization for post acute rehab  · Family unable to provide appropriate assistance in independent living facility  · Case Management to assist with written appeal    * Late onset Alzheimer's disease with behavioral disturbance ([facility] )  Assessment & Plan  Background: Transferred from current facility [facility]) for worsening dementia with agitated features  Patient has no idea why he is here, unable to give any pertinent history  · Per patient's daughter, facility told her he had an abscess in his buttocks which required evaluation  · Continue Seroquel and Zyprexa as needed  · Restraints removed 09/12, behavior has been stable  · PT OT recommending post acute rehab    Primary hypertension  Assessment & Plan  · Has been on Lopressor 50 mg daily prior to admission  · Decreased to 25 mg> subsequently discontinued given persistent bradycardia  · Continue amlodipine 5 mg daily  · Blood pressure remains stable with appropriate heart rate  · Monitor with routine vitals    Pressure injury of sacral region, stage 2 [facility])  Assessment & Plan  · Wound care nurse consult, continue supportive care  · Does not appear infected  · Offloading and local wound care    Rheumatoid arthritis with positive rheumatoid factor (HCC)  Assessment & Plan  · On chronic steroids, will continue  · Follow-up with rheumatology outpatient      VTE Pharmacologic Prophylaxis: VTE Score: 4 Moderate Risk (Score 3-4) - Pharmacological DVT Prophylaxis Ordered: enoxaparin (Lovenox)  Patient Centered Rounds: I performed bedside rounds with nursing staff today    Discussions with Specialists or Other Care Team Provider:  Nursing staff, case management    Education and Discussions with Family / Patient: Updated  (daughter) via phone  Time Spent for Care: 30 minutes  More than 50% of total time spent on counseling and coordination of care as described above  Current Length of Stay: 6 day(s)  Current Patient Status: Inpatient   Certification Statement: The patient will continue to require additional inpatient hospital stay due to Placement to facility and undergoing written appealed for insurance  Discharge Plan: Pending safe discharge plan not likely for 48-72 hours    Code Status: Level 1 - Full Code    Subjective:   Patient denies any nausea, vomiting, diarrhea, or chest pain or shortness of breath    Objective:     Vitals:   Temp (24hrs), Av 3 °F (36 8 °C), Min:97 8 °F (36 6 °C), Max:98 8 °F (37 1 °C)    Temp:  [97 8 °F (36 6 °C)-98 8 °F (37 1 °C)] 97 8 °F (36 6 °C)  HR:  [57-77] 65  Resp:  [16-17] 17  BP: (106-157)/(59-76) 106/63  SpO2:  [96 %-97 %] 97 %  There is no height or weight on file to calculate BMI  Input and Output Summary (last 24 hours): Intake/Output Summary (Last 24 hours) at 9/15/2022 1305  Last data filed at 9/15/2022 1300  Gross per 24 hour   Intake 420 ml   Output 98 ml   Net 322 ml       Physical Exam:   Physical Exam  Constitutional:       Appearance: Normal appearance  He is not ill-appearing  Cardiovascular:      Rate and Rhythm: Normal rate  Pulses: Normal pulses  Pulmonary:      Effort: Pulmonary effort is normal       Breath sounds: Normal breath sounds  Abdominal:      General: Bowel sounds are normal       Palpations: Abdomen is soft  Musculoskeletal:         General: No swelling  Skin:     General: Skin is warm and dry  Capillary Refill: Capillary refill takes less than 2 seconds  Neurological:      Mental Status: He is alert  Mental status is at baseline  He is disoriented     Psychiatric:         Mood and Affect: Mood normal          Behavior: Behavior normal           Additional Data:     Labs:  Results from last 7 days   Lab Units 09/08/22  1632   WBC Thousand/uL 7 02   HEMOGLOBIN g/dL 12 1   HEMATOCRIT % 37 9   PLATELETS Thousands/uL 177   NEUTROS PCT % 70   LYMPHS PCT % 20   MONOS PCT % 8   EOS PCT % 1     Results from last 7 days   Lab Units 09/08/22  1632   SODIUM mmol/L 142   POTASSIUM mmol/L 5 0   CHLORIDE mmol/L 109*   CO2 mmol/L 27   BUN mg/dL 39*   CREATININE mg/dL 1 72*   ANION GAP mmol/L 6   CALCIUM mg/dL 8 5   ALBUMIN g/dL 3 5   TOTAL BILIRUBIN mg/dL 0 38   ALK PHOS U/L 70   ALT U/L 5*   AST U/L 18   GLUCOSE RANDOM mg/dL 114                       Lines/Drains:  Invasive Devices  Report    None               Imaging: No pertinent imaging reviewed  Recent Cultures (last 7 days):         Last 24 Hours Medication List:   Current Facility-Administered Medications   Medication Dose Route Frequency Provider Last Rate    acetaminophen  650 mg Oral Q6H PRN Riki Magana MD      aluminum-magnesium hydroxide-simethicone  30 mL Oral Q6H PRN Riki Magana MD      amLODIPine  5 mg Oral Daily Diamond Garcia PA-C      citalopram  20 mg Oral Daily Riki Magana MD      clopidogrel  75 mg Oral Daily Riki Magana MD      donepezil  10 mg Oral HS Riki Magana MD      enoxaparin  40 mg Subcutaneous Daily Riki Magana MD      gabapentin  100 mg Oral BID Riki Magana MD      OLANZapine  5 mg Oral Q3H PRN Diamond Garcia PA-C      ondansetron  4 mg Intravenous Q6H PRN Riki Magana MD      polyethylene glycol  17 g Oral Daily PRN Riki Magana MD      predniSONE  10 mg Oral Daily Riki Magana MD      QUEtiapine  100 mg Oral HS Riki Magana MD          Today, Patient Was Seen By: JAYMIE Sheldon    **Please Note: This note may have been constructed using a voice recognition system  **

## 2022-09-15 NOTE — PHYSICAL THERAPY NOTE
PHYSICAL THERAPY NOTE          Patient Name: Dominique Aguilar  QERHO'T Date: 9/15/2022         09/15/22 1055   PT Last Visit   PT Visit Date 09/15/22   Note Type   Note Type Treatment   Pain Assessment   Pain Assessment Tool 0-10   Pain Score 6   Pain Location/Orientation Orientation: Bilateral;Location: Foot   Effect of Pain on Daily Activities limits functional mobility and activity tolerance   Patient's Stated Pain Goal No pain   Hospital Pain Intervention(s) Repositioned; Ambulation/increased activity; Emotional support; Rest   Multiple Pain Sites No   Pain Rating: FLACC (Rest) - Face 0   Pain Rating: FLACC (Rest) - Legs 1   Pain Rating: FLACC (Rest) - Activity 0   Pain Rating: FLACC (Rest) - Cry 0   Pain Rating: FLACC (Rest) - Consolability 0   Score: FLACC (Rest) 1   Pain Rating: FLACC (Activity) - Face 1   Pain Rating: FLACC (Activity) - Legs 0   Pain Rating: FLACC (Activity) - Activity 0   Pain Rating: FLACC (Activity) - Cry 0   Pain Rating: FLACC (Activity) - Consolability 0   Score: FLACC (Activity) 1   Restrictions/Precautions   Weight Bearing Precautions Per Order No   Other Precautions Impulsive;Cognitive; Chair Alarm; Bed Alarm; Fall Risk   General   Chart Reviewed Yes   Response to Previous Treatment Patient with no complaints from previous session  Family/Caregiver Present No   Cognition   Overall Cognitive Status Impaired   Arousal/Participation Alert; Responsive; Cooperative   Attention Attends with cues to redirect   Orientation Level Oriented to person;Oriented to place;Oriented to situation   Memory Decreased short term memory;Decreased recall of recent events   Following Commands Follows one step commands with increased time or repetition   Comments pt was cooperative throughout tx session   Subjective   Subjective pt was agreeable to participate in PT intervention   Bed Mobility   Rolling R Unable to assess   Rolling L Unable to assess   Supine to Sit Unable to assess   Sit to Supine 5  Supervision   Additional items Assist x 1;HOB elevated; Bedrails; Increased time required;Verbal cues   Additional Comments pt was able to to EOB w/o LOB and reposition himself towards [facility] post tx session   Transfers   Sit to Stand 5  Supervision   Additional items Assist x 1; Armrests; Increased time required;Verbal cues  (w/ RW)   Stand to Sit 5  Supervision   Additional items Assist x 1; Armrests; Increased time required;Verbal cues  (w/ RW)   Stand pivot 5  Supervision   Additional items Assist x 1; Armrests; Increased time required;Verbal cues  (w/ RW)   Additional Comments pt continues to require RW to complete all functional transfers safely in todays tx session with VC's for rW management   Ambulation/Elevation   Gait pattern Decreased foot clearance; Short stride; Excessively slow;Decreased heel strike;Decreased toe off   Gait Assistance 4  Minimal assist   Additional items Assist x 1;Verbal cues; Tactile cues   Assistive Device Rolling walker   Distance 140'x1 RW   Stair Management Assistance Not tested   Ambulation/Elevation Additional Comments pt required min Ax1 while completing a directional change has pt had slight LOB what required assistance  pt also required VC's to keep rW on the floor as pt lifted RW off the floor while making a directional change   Balance   Static Sitting Fair +   Dynamic Sitting Fair   Static Standing Fair -   Dynamic Standing Poor +   Ambulatory Poor +   Endurance Deficit   Endurance Deficit Yes   Endurance Deficit Description pt continues to be limited due to fatigue   Activity Tolerance   Activity Tolerance Patient limited by fatigue   Nurse Made Aware Spoke to RN   Exercises   Hip Abduction Sitting;15 reps;AROM; Bilateral   Hip Adduction Sitting;15 reps;AROM; Bilateral  (pillow squeezes)   Knee AROM Long Arc Quad 20 reps;AROM; Bilateral   Ankle Pumps Sitting;20 reps;AROM; Bilateral   Marching Sitting;15 reps;AROM; Bilateral   Assessment   Prognosis Fair   Problem List Decreased strength; Impaired balance;Decreased mobility; Decreased cognition;Decreased safety awareness; Impaired judgement;Decreased skin integrity   Assessment pt began tx session seated OOB in the recliner and was agreeable to participate in PT intervention  progress was noted this tx session as pt was able to perform multiple STS from recliner to RW with a decrease in level of assistance required /s with VC's for hand placement as pt did require + time to complete all functional transfers  pt was able to perform TE seated in recliner w/o increased pain and good form throughout tx session  pt continues to remain consistant with ambulation distance and amount of assistance required to decrease falls and risk for injuries min Ax1 and VC's for RW management  post tx session pt in bed with call bell and all pt needs met  Goals   Patient Goals to get into bed   STG Expiration Date 09/20/22   PT Treatment Day 3   Plan   Treatment/Interventions Functional transfer training;LE strengthening/ROM; Therapeutic exercise; Endurance training;Cognitive reorientation;Patient/family training;Equipment eval/education; Bed mobility;Gait training;Spoke to nursing   Progress Progressing toward goals   PT Frequency 3-5x/wk   Recommendation   PT Discharge Recommendation Post acute rehabilitation services  (vs return to facility w/ rehab services)   Equipment Recommended Donald walker   Change/add to Inbox?  No   AM-PAC Basic Mobility Inpatient   Turning in Bed Without Bedrails 3   Lying on Back to Sitting on Edge of Flat Bed 3   Moving Bed to Chair 3   Standing Up From Chair 3   Walk in Room 3   Climb 3-5 Stairs 1   Basic Mobility Inpatient Raw Score 16   Basic Mobility Standardized Score 38 32   Highest Level Of Mobility   JH-HLM Goal 5: Stand one or more mins   JH-HLM Achieved 7: Walk 25 feet or more   Education   Education Provided Mobility training;Assistive device   Patient Demonstrates acceptance/verbal understanding   End of Consult   Patient Position at End of Consult Supine;Bed/Chair alarm activated; All needs within reach   The patient's AM-PAC Basic Mobility Inpatient Short Form Raw Score is 16  A Raw score of less than or equal to 16 suggests the patient may benefit from discharge to post-acute rehabilitation services  Please also refer to the recommendation of the Physical Therapist for safe discharge planning       Rekha Mcguire PTA

## 2022-09-15 NOTE — CASE MANAGEMENT
Case Management Discharge Planning Note    Patient name Luis Perdomo  Location W /W [id number] MRN [id number]  : 1931 Date 9/15/2022       Current Admission Date: 2022  Current Admission Diagnosis:Late onset Alzheimer's disease with behavioral disturbance [facility])   Patient Active Problem List    Diagnosis Date Noted    Pressure injury of sacral region, stage 2 ([facility] ) 2022    Buttock wound 2022    Positive blood cultures 2022    UTI (urinary tract infection) 2022    Primary hypertension 10/24/2021    [facility]peripheral [facility] 10/24/2021    Late onset Alzheimer's disease with behavioral disturbance ([place] [address] ) 2021    Benign prostatic hyperplasia with post-void dribbling 2021    Rheumatoid arthritis with positive rheumatoid factor ([place] [address] ) 2021    Agitation due to dementia ([place] [address] ) 2021      LOS (days): 6  Geometric Mean LOS (GMLOS) (days): 4 50  Days to GMLOS:-1 5     OBJECTIVE:  Risk of Unplanned Readmission Score: 19 57         Current admission status: Inpatient   Preferred Pharmacy:   [address] [address]  Phone: [phone] Fax: [phone]    [facility]/pharmacy #[id number]- [address] [address]  Phone: [phone] Fax: [phone]    Primary Care Provider: No primary care provider on file  Primary Insurance: [address] [place] [address],[address] REP  Secondary Insurance:     DISCHARGE DETAILS:    Discharge planning discussed with[de-identified] [facility] admissions and [facility]                                     Other Referral/Resources/Interventions Provided:  Interventions: Short Term Rehab  Referral Comments: JORGE followed up with [facility] admissions team on prior authorizaiton  JORGE was notified that [address] [place] [address] [employer]    Requesting MD peer to peer which is due today by 4:30pm   JORGE provided [facility] provider with phone number [phone] reference number [id number]  Additional Comments: SNF prior authorization was denied and insurance was requesting a peer to peer to be completed today by 4:30 PM   CM passed this informaiton on the [facility] AP following and spoke with Lester Glass from [facility] to let him know we were moving forward with completing the peer to peer  CM department will continue to follow to assist with discharge coordination

## 2022-09-15 NOTE — OCCUPATIONAL THERAPY NOTE
Occupational Therapy Progress Note     Patient Name: Murray Puri  SCDXD'K Date: 9/15/2022  Problem List  Principal Problem:    Late onset [facility]'s disease with [facility] ([facility] )  Active Problems:    Rheumatoid arthritis with positive rheumatoid factor ([facility] )    Primary hypertension    Pressure injury of sacral region, stage 2 ([facility] )    Inability to return to living situation              09/15/22 1325   OT Last Visit   OT Visit Date 09/15/22   Note Type   Note Type Treatment for insurance authorization   Restrictions/Precautions   Weight Bearing Precautions Per Order No   Other Precautions Impulsive;Cognitive; Chair Alarm; Bed Alarm; Fall Risk   General   Response to Previous Treatment Patient with no complaints from previous session   Pain Assessment   Pain Assessment Tool 0-10   Pain Score No Pain   ADL   UB Dressing Assistance 4  Minimal Assistance   UB Dressing Deficit Increased time to complete;Supervision/safety;Verbal cueing   UB Dressing Comments donning gown, A with location of sleeves   LB Dressing Assistance 3  Moderate Assistance   LB Dressing Deficit Increased time to complete;Supervision/safety;Verbal cueing; Thread RLE into pants; Thread LLE into pants;Pull up over hips   LB Dressing Comments Pt declining to sit at EOB to don pants, attempted in long sitting, A for threading RLE into pants, encouragement to sit EOB to  order to pull over hips, Pt requiring A to tie in front   Toileting Comments pt incontinent of urine, pt  reports "I know" and then requires VC and encouragement to initiate hygiene   Bed Mobility   Supine to Sit 5  Supervision   Additional items Increased time required;Verbal cues   Additional Comments OOB and in chair at end of session   Transfers   Sit to Stand 5  Supervision   Additional items Increased time required;Verbal cues   Stand to Sit 3  Moderate assistance   Additional items Assist x 1; Increased time required;Verbal cues; Impulsive  (Pt attempting to sit x2 ft away from chair, requiring max VC and mod physical assist for positioning buttocks into chair)   Additional Comments max VC, use of RW   Functional Mobility   Functional Mobility 4  Minimal assistance   Additional Comments Ax1, around bed to chair, max VC for encouragement to continue   Cognition   Overall Cognitive Status Impaired   Arousal/Participation Alert; Cooperative   Attention Attends with cues to redirect   Orientation Level Oriented to person;Oriented to place; Disoriented to time;Disoriented to situation   Memory Decreased recall of recent events;Decreased short term memory   Following Commands Follows one step commands with increased time or repetition   Comments Pt intermittently nonsensical during session, [place] asked what he likes to do at [facility]   Activity Tolerance   Activity Tolerance Patient tolerated treatment well   Medical Staff Made Aware DEB [place]   Assessment   Assessment Pt seen on this date for skilled OT treatment session  At start of session pt supine in bed  Pt noted to be saturated in bed, when asked if pt was aware he was saturated pt stating "I know"  Pt requiring max encouragement for completion of hygiene and donning clean clothes  Increased assist for LB dressing due to pt insistent on donning while long sitting in bed  Pt retropulsive upon standing to don pants, requiring min A for standing balance  When attempting to return to sitting in recliner chair pt attempting to sit 2 ft in front of chair and requiring increased assist for safe sitting and positioning pt's buttocks onto chair  Pt educated extensively on backing up to chair prior to sitting  Pt continues to have limited functional endurance and requires VC for encouragement to participate  Continues to be limited by overall safety awareness, limited ADL performance and functional mobility   Pt would continue to benefit from skilled OT treatment sessions in order to address remaining deficits and continue to recommend d/c to rehab when medically stable  Plan   Goal Expiration Date 09/22/22   OT Treatment Day 1   OT Frequency 2-3x/wk   Recommendation   OT Discharge Recommendation Post acute rehabilitation services  (vs return to [facility] with therapy services pending level of assist [facility] is able to provide)   AM-PAC Daily Activity Inpatient   Lower Body Dressing 2   Bathing 2   Toileting 3   Upper Body Dressing 3   Grooming 3   Eating 4   Daily Activity Raw Score 17   Daily Activity Standardized Score (Calc for Raw Score >=11) 37 26   AM-[facility] Applied Cognition Inpatient   Following a Speech/Presentation 2   Understanding Ordinary Conversation 3   Taking Medications 1   Remembering Where Things Are Placed or Put Away 2   Remembering List of 4-5 Errands 1   Taking Care of Complicated Tasks 1   Applied Cognition Raw Score 10   Applied Cognition Standardized Score 24 98     The patient's raw score on the AM-PAC Daily Activity inpatient short form is 17, standardized score is 37 26, less than 39 4  Patients at this level are likely to benefit from discharge to post-acute rehabilitation services  Please refer to the recommendation of the Occupational Therapist for safe discharge planning        At the end of the session, all needs met and pt seated in bedside chair, chair alarm activated, and call bell within reach    [facility], OTR/L

## 2022-09-15 NOTE — ASSESSMENT & PLAN NOTE
· Has been on Lopressor 50 mg daily prior to admission  · Decreased to 25 mg> subsequently discontinued given persistent bradycardia  · Continue amlodipine 5 mg daily  · Blood pressure remains stable with appropriate heart rate  · Monitor with routine vitals

## 2022-09-15 NOTE — PLAN OF CARE
Problem: MOBILITY - ADULT  Goal: Maintain or return to baseline ADL function  Description: INTERVENTIONS:  -  Assess patient's ability to carry out ADLs; assess patient's baseline for ADL function and identify physical deficits which impact ability to perform ADLs (bathing, care of mouth/teeth, toileting, grooming, dressing, etc )  - Assess/evaluate cause of self-care deficits   - Assess range of motion  - Assess patient's mobility; develop plan if impaired  - Assess patient's need for assistive devices and provide as appropriate  - Encourage maximum independence but intervene and supervise when necessary  - Involve family in performance of ADLs  - Assess for home care needs following discharge   - Consider OT consult to assist with ADL evaluation and planning for discharge  - Provide patient education as appropriate  Outcome: Progressing  Goal: Maintains/Returns to pre admission functional level  Description: INTERVENTIONS:  - Perform BMAT or MOVE assessment daily    - Set and communicate daily mobility goal to care team and patient/family/caregiver  - Collaborate with rehabilitation services on mobility goals if consulted  - Stand patient 6 times a day  - Ambulate patient 4 times a day  - Out of bed to chair 3 times a day   - Out of bed for meals 3 times a day  - Out of bed for toileting  - Record patient progress and toleration of activity level   Outcome: Progressing     Problem: Knowledge Deficit  Goal: Patient/family/caregiver demonstrates understanding of disease process, treatment plan, medications, and discharge instructions  Description: Complete learning assessment and assess knowledge base    Interventions:  - Provide teaching at level of understanding  - Provide teaching via preferred learning methods  Outcome: Progressing     Problem: SAFETY ADULT  Goal: Patient will remain free of falls  Description: INTERVENTIONS:  - Educate patient/family on patient safety including physical limitations  - Instruct patient to call for assistance with activity   - Consult OT/PT to assist with strengthening/mobility   - Keep Call bell within reach  - Keep bed low and locked with side rails adjusted as appropriate  - Keep care items and personal belongings within reach  - Initiate and maintain comfort rounds  - Make Fall Risk Sign visible to staff  - Offer Toileting every 2 Hours, in advance of need  - Initiate/Maintain bed/chair alarm  - Obtain necessary fall risk management equipment: walker  - Apply yellow socks and bracelet for high fall risk patients  - Consider moving patient to room near nurses station  Outcome: Progressing  Goal: Maintain or return to baseline ADL function  Description: INTERVENTIONS:  -  Assess patient's ability to carry out ADLs; assess patient's baseline for ADL function and identify physical deficits which impact ability to perform ADLs (bathing, care of mouth/teeth, toileting, grooming, dressing, etc )  - Assess/evaluate cause of self-care deficits   - Assess range of motion  - Assess patient's mobility; develop plan if impaired  - Assess patient's need for assistive devices and provide as appropriate  - Encourage maximum independence but intervene and supervise when necessary  - Involve family in performance of ADLs  - Assess for home care needs following discharge   - Consider OT consult to assist with ADL evaluation and planning for discharge  - Provide patient education as appropriate  Outcome: Progressing  Goal: Maintains/Returns to pre admission functional level  Description: INTERVENTIONS:  - Perform BMAT or MOVE assessment daily    - Set and communicate daily mobility goal to care team and patient/family/caregiver     - Collaborate with rehabilitation services on mobility goals if consulted  - Stand patient 6 times a day  - Ambulate patient 4 times a day  - Out of bed to chair 3 times a day   - Out of bed for meals 3 times a day  - Out of bed for toileting  - Record patient progress and toleration of activity level   Outcome: Progressing

## 2022-09-15 NOTE — ASSESSMENT & PLAN NOTE
· [employer] declined insurance authorization for post acute rehab  · Family unable to provide appropriate assistance in independent living facility  · Case Management to assist with written appeal

## 2022-09-15 NOTE — ASSESSMENT & PLAN NOTE
Background: Transferred from current [facility] for worsening dementia with agitated features  Patient has no idea why he is here, unable to give any pertinent history  · Per patient's daughter, facility told her he had an abscess in his buttocks which required evaluation    · Continue Seroquel and Zyprexa as needed  · Restraints removed 09/12, behavior has been stable  · PT OT recommending post acute rehab

## 2022-09-15 NOTE — PLAN OF CARE
Problem: INFECTION - ADULT  Goal: Absence or prevention of progression during hospitalization  Description: INTERVENTIONS:  - Assess and monitor for signs and symptoms of infection  - Monitor lab/diagnostic results  - Monitor all insertion sites, i e  indwelling lines, tubes, and drains  - Monitor endotracheal if appropriate and nasal secretions for changes in amount and color  - [place] appropriate cooling/warming therapies per order  - Administer medications as ordered  - Instruct and encourage patient and family to use good hand hygiene technique  - Identify and instruct in appropriate isolation precautions for identified infection/condition  Outcome: Progressing  Goal: Absence of fever/infection during neutropenic period  Description: INTERVENTIONS:  - Monitor WBC    Outcome: Progressing     Problem: SAFETY ADULT  Goal: Patient will remain free of falls  Description: INTERVENTIONS:  - Educate patient/family on patient safety including physical limitations  - Instruct patient to call for assistance with activity   - Consult OT/PT to assist with strengthening/mobility   - Keep Call bell within reach  - Keep bed low and locked with side rails adjusted as appropriate  - Keep care items and personal belongings within reach  - Initiate and maintain comfort rounds  - Make Fall Risk Sign visible to staff  - Offer Toileting every 2 Hours, in advance of need  - Initiate/Maintain bed/chair alarm  - Obtain necessary fall risk management equipment: walker  - Apply yellow socks and bracelet for high fall risk patients  - Consider moving patient to room near nurses station  Outcome: Progressing  Goal: Maintain or return to baseline ADL function  Description: INTERVENTIONS:  -  Assess patient's ability to carry out ADLs; assess patient's baseline for ADL function and identify physical deficits which impact ability to perform ADLs (bathing, care of mouth/teeth, toileting, grooming, dressing, etc )  - Assess/evaluate cause of self-care deficits   - Assess range of motion  - Assess patient's mobility; develop plan if impaired  - Assess patient's need for assistive devices and provide as appropriate  - Encourage maximum independence but intervene and supervise when necessary  - Involve family in performance of ADLs  - Assess for home care needs following discharge   - Consider OT consult to assist with ADL evaluation and planning for discharge  - Provide patient education as appropriate  Outcome: Progressing  Goal: Maintains/Returns to pre admission functional level  Description: INTERVENTIONS:  - Perform BMAT or MOVE assessment daily    - Set and communicate daily mobility goal to care team and patient/family/caregiver     - Collaborate with rehabilitation services on mobility goals if consulted  - Stand patient 6 times a day  - Ambulate patient 4 times a day  - Out of bed to chair 3 times a day   - Out of bed for meals 3 times a day  - Out of bed for toileting  - Record patient progress and toleration of activity level   Outcome: Progressing     Problem: DISCHARGE PLANNING  Goal: Discharge to home or other facility with appropriate resources  Description: INTERVENTIONS:  - Identify barriers to discharge w/patient and caregiver  - Arrange for needed discharge resources and transportation as appropriate  - Identify discharge learning needs (meds, wound care, etc )  - Arrange for interpretive services to assist at discharge as needed  - Refer to Case Management Department for coordinating discharge planning if the patient needs post-hospital services based on physician/advanced practitioner order or complex needs related to functional status, cognitive ability, or social support system  Outcome: Progressing     Problem: Knowledge Deficit  Goal: Patient/family/caregiver demonstrates understanding of disease process, treatment plan, medications, and discharge instructions  Description: Complete learning assessment and assess knowledge base   Interventions:  - Provide teaching at level of understanding  - Provide teaching via preferred learning methods  Outcome: Progressing     Problem: Prexisting or High Potential for Compromised Skin Integrity  Goal: Skin integrity is maintained or improved  Description: INTERVENTIONS:  - Identify patients at risk for skin breakdown  - Assess and monitor skin integrity  - Assess and monitor nutrition and hydration status  - Monitor labs   - Assess for incontinence   - Turn and reposition patient  - Assist with mobility/ambulation  - Relieve pressure over bony prominences  - Avoid friction and shearing  - Provide appropriate hygiene as needed including keeping skin clean and dry  - Evaluate need for skin moisturizer/barrier cream  - Collaborate with interdisciplinary team   - Patient/family teaching  - Consider wound care consult   Outcome: Progressing     Problem: Potential for Falls  Goal: Patient will remain free of falls  Description: INTERVENTIONS:  - Educate patient/family on patient safety including physical limitations  - Instruct patient to call for assistance with activity   - Consult OT/PT to assist with strengthening/mobility   - Keep Call bell within reach  - Keep bed low and locked with side rails adjusted as appropriate  - Keep care items and personal belongings within reach  - Initiate and maintain comfort rounds  - Make Fall Risk Sign visible to staff  - Offer Toileting every 2 Hours, in advance of need  - Initiate/Maintain bed/chair alarm  - Obtain necessary fall risk management equipment: walker  - Apply yellow socks and bracelet for high fall risk patients  - Consider moving patient to room near nurses station  Outcome: Progressing     Problem: MOBILITY - ADULT  Goal: Maintain or return to baseline ADL function  Description: INTERVENTIONS:  -  Assess patient's ability to carry out ADLs; assess patient's baseline for ADL function and identify physical deficits which impact ability to perform ADLs (bathing, care of mouth/teeth, toileting, grooming, dressing, etc )  - Assess/evaluate cause of self-care deficits   - Assess range of motion  - Assess patient's mobility; develop plan if impaired  - Assess patient's need for assistive devices and provide as appropriate  - Encourage maximum independence but intervene and supervise when necessary  - Involve family in performance of ADLs  - Assess for home care needs following discharge   - Consider OT consult to assist with ADL evaluation and planning for discharge  - Provide patient education as appropriate  Outcome: Progressing  Goal: Maintains/Returns to pre admission functional level  Description: INTERVENTIONS:  - Perform BMAT or MOVE assessment daily    - Set and communicate daily mobility goal to care team and patient/family/caregiver  - Collaborate with rehabilitation services on mobility goals if consulted  - Stand patient 6 times a day  - Ambulate patient 4 times a day  - Out of bed to chair 3 times a day   - Out of bed for meals 3 times a day  - Out of bed for toileting  - Record patient progress and toleration of activity level   Outcome: Progressing     Problem: Nutrition/Hydration-ADULT  Goal: Nutrient/Hydration intake appropriate for improving, restoring or maintaining nutritional needs  Description: Monitor and assess patient's nutrition/hydration status for malnutrition  Collaborate with interdisciplinary team and initiate plan and interventions as ordered  Monitor patient's weight and dietary intake as ordered or per policy  Utilize nutrition screening tool and intervene as necessary  Determine patient's food preferences and provide high-protein, high-caloric foods as appropriate       INTERVENTIONS:  - Monitor oral intake, urinary output, labs, and treatment plans  - Assess nutrition and hydration status and recommend course of action  - Evaluate amount of meals eaten  - Assist patient with eating if necessary   - Allow adequate time for meals  - Recommend/ encourage appropriate diets, oral nutritional supplements, and vitamin/mineral supplements  - Order, calculate, and assess calorie counts as needed  - Recommend, monitor, and adjust tube feedings and TPN/PPN based on assessed needs  - Assess need for intravenous fluids  - Provide specific nutrition/hydration education as appropriate  - Include patient/family/caregiver in decisions related to nutrition  Outcome: Progressing

## 2022-09-15 NOTE — PLAN OF CARE
Problem: PHYSICAL THERAPY ADULT  Goal: Performs mobility at highest level of function for planned discharge setting  See evaluation for individualized goals  Description: Treatment/Interventions: Functional transfer training, LE strengthening/ROM, Therapeutic exercise, Endurance training, Cognitive reorientation, Equipment eval/education, Patient/family training, Bed mobility, Gait training  Equipment Recommended: Earl Novka       See flowsheet documentation for full assessment, interventions and recommendations  Outcome: Progressing  Note: Prognosis: Fair  Problem List: Decreased strength, Impaired balance, Decreased mobility, Decreased cognition, Decreased safety awareness, Impaired judgement, Decreased skin integrity  Assessment: pt began tx session seated OOB in the recliner and was agreeable to participate in PT intervention  progress was noted this tx session as pt was able to perform multiple STS from recliner to RW with a decrease in level of assistance required /s with VC's for hand placement as pt did require + time to complete all functional transfers  pt was able to perform TE seated in recliner w/o increased pain and good form throughout tx session  pt continues to remain consistant with ambulation distance and amount of assistance required to decrease falls and risk for injuries min Ax1 and VC's for RW management  post tx session pt in bed with call bell and all pt needs met  PT Discharge Recommendation: Post acute rehabilitation services (vs return to facility w/ rehab services)    See flowsheet documentation for full assessment

## 2022-09-16 PROCEDURE — 99232 SBSQ HOSP IP/OBS MODERATE 35: CPT

## 2022-09-16 RX ADMIN — DONEPEZIL HYDROCHLORIDE 10 MG: 5 TABLET ORAL at 21:36

## 2022-09-16 RX ADMIN — CLOPIDOGREL BISULFATE 75 MG: 75 TABLET ORAL at 08:44

## 2022-09-16 RX ADMIN — OLANZAPINE 5 MG: 5 TABLET, ORALLY DISINTEGRATING ORAL at 21:36

## 2022-09-16 RX ADMIN — QUETIAPINE FUMARATE 100 MG: 100 TABLET ORAL at 21:36

## 2022-09-16 RX ADMIN — GABAPENTIN 100 MG: 100 CAPSULE ORAL at 08:44

## 2022-09-16 RX ADMIN — GABAPENTIN 100 MG: 100 CAPSULE ORAL at 17:16

## 2022-09-16 RX ADMIN — ENOXAPARIN SODIUM 40 MG: 40 INJECTION SUBCUTANEOUS at 08:43

## 2022-09-16 RX ADMIN — CITALOPRAM HYDROBROMIDE 20 MG: 20 TABLET ORAL at 08:44

## 2022-09-16 RX ADMIN — AMLODIPINE BESYLATE 5 MG: 5 TABLET ORAL at 08:44

## 2022-09-16 RX ADMIN — PREDNISONE 10 MG: 10 TABLET ORAL at 08:44

## 2022-09-16 NOTE — ASSESSMENT & PLAN NOTE
· [employer] declined insurance authorization for post acute rehab  · Family unable to provide appropriate assistance in independent living facility  · Case Management to assist with written appeal  · Still waiting to be optioned, CM reports likely here until sometime next week

## 2022-09-16 NOTE — ASSESSMENT & PLAN NOTE
Transferred from [facility]) for worsening dementia with agitated features  Patient unaware of where he is and why  · Per patient's daughter, facility told her he had an abscess in his buttocks which required evaluation    · Continue Seroquel and Zyprexa as needed  · Patient was in restraints twice throughout hospitalization currently off restraints   · PT/OT recommending post acute rehab CM following awaiting option paperwork

## 2022-09-16 NOTE — ASSESSMENT & PLAN NOTE
· Has been on Lopressor 50 mg daily prior to admission  · Decreased to 25 mg then discontinued given persistent bradycardia  · Continue amlodipine 5 mg daily  · -140 remains stable with appropriate heart rate  · Monitor with routine vitals

## 2022-09-16 NOTE — ASSESSMENT & PLAN NOTE
Transferred from current facility [facility]) for worsening dementia with agitated features  Patient has no idea why he is here, unable to give any pertinent history  · Per patient's daughter, facility told her he had an abscess in his buttocks which required evaluation    · Continue Seroquel and Zyprexa as needed  · Patient is restraints twice throughout hospitalization currently in non violent restraints since last night due to danger to self, will discontinue  · PT/OT recommending post acute rehab CM following seeking placement

## 2022-09-16 NOTE — PROGRESS NOTES
[facility]  Progress Note - Maranda Jason 9/7/1931, 80 y o  male MRN: [id number]  Unit/Bed#: W -01 Encounter: [id number]  Primary Care Provider: No primary care provider on file  Date and time admitted to hospital: 9/8/2022  3:17 PM    * Late onset Alzheimer's disease with behavioral disturbance [facility])  Assessment & Plan   [facility]red [facility] for worsening dementia with agitated features  Patient has no idea why he is here, unable to give any pertinent history  · Per patient's daughter, facility told her he had an abscess in his buttocks which required evaluation  · Continue Seroquel and Zyprexa as needed  · Patient is restraints twice throughout hospitalization currently in non violent restraints since last night due to danger to self, will discontinue  · PT/OT recommending post acute rehab CM following seeking placement    Rheumatoid arthritis with positive rheumatoid factor ([facility] [address] )  Assessment & Plan  · On chronic steroids, will continue  · Follow-up with rheumatology outpatient    Pressure injury of sacral region, stage 2 [facility])  Assessment & Plan  · Wound care nurse consult, continue supportive care    · Does not appear infected  · Offloading and local wound care    Primary hypertension  Assessment & Plan  · Has been on Lopressor 50 mg daily prior to admission  · Decreased to 25 mg then discontinued given persistent bradycardia  · Continue amlodipine 5 mg daily  · Blood pressure remains stable with appropriate heart rate  · Monitor with routine vitals    Inability to return to living situation  Assessment & Plan  · [employer] declined insurance authorization for post acute rehab  · Family unable to provide appropriate assistance in independent living facility  · Case Management to assist with written appeal  · Still waiting to be placed, CM reports likely here until sometime next week      VTE Pharmacologic Prophylaxis: VTE Score: 4 Moderate Risk (Score 3-4) - Pharmacological DVT Prophylaxis Ordered: enoxaparin (Lovenox)  Patient Centered Rounds: I performed bedside rounds with nursing staff today  Discussions with Specialists or Other Care Team Provider: none    Education and Discussions with Family / Patient: Updated  (daughter) via phone  Time Spent for Care: 30 minutes  More than 50% of total time spent on counseling and coordination of care as described above  Current Length of Stay: 7 day(s)  Current Patient Status: Inpatient   Certification Statement: The patient will continue to require additional inpatient hospital stay due to unsafe discharge seeking LTC  Discharge Plan: Anticipate discharge in 48-72 hrs to rehab facility  Code Status: Level 1 - Full Code    Subjective:   Patient was seen laying in bed today  History is limited due to alzheimer's disease  He denies that he is in any pain  He states that he feels well and is eating  Objective:     Vitals:   Temp (24hrs), Av 1 °F (36 2 °C), Min:96 7 °F (35 9 °C), Max:97 5 °F (36 4 °C)    Temp:  [96 7 °F (35 9 °C)-97 5 °F (36 4 °C)] 97 2 °F (36 2 °C)  HR:  [66-79] 66  Resp:  [16-18] 18  BP: (129-177)/(63-87) 135/63  SpO2:  [94 %] 94 %  There is no height or weight on file to calculate BMI  Input and Output Summary (last 24 hours): Intake/Output Summary (Last 24 hours) at 2022 1721  Last data filed at 9/15/2022 1838  Gross per 24 hour   Intake 480 ml   Output 86 ml   Net 394 ml       Physical Exam:   Physical Exam  Vitals and nursing note reviewed  Constitutional:       Appearance: Normal appearance  HENT:      Head: Normocephalic and atraumatic  Eyes:      General: No scleral icterus  Cardiovascular:      Rate and Rhythm: Normal rate and regular rhythm  Pulmonary:      Effort: Pulmonary effort is normal       Breath sounds: Normal breath sounds  No stridor  No wheezing or rales  Abdominal:      General: Abdomen is flat   Bowel sounds are normal  There is no distension  Palpations: Abdomen is soft  Tenderness: There is no abdominal tenderness  Musculoskeletal:      Right lower leg: No edema  Left lower leg: No edema  Skin:     General: Skin is warm and dry  Neurological:      Mental Status: He is alert  Mental status is at baseline  He is disoriented  Comments: Oriented to person    Psychiatric:         Mood and Affect: Mood normal          Behavior: Behavior normal          Additional Data:     Labs:                            Lines/Drains:  Invasive Devices  Report    None                       Imaging: No pertinent imaging reviewed  Recent Cultures (last 7 days):         Last 24 Hours Medication List:   Current Facility-Administered Medications   Medication Dose Route Frequency Provider Last Rate    acetaminophen  650 mg Oral Q6H PRN Patricia Steinberg MD      aluminum-magnesium hydroxide-simethicone  30 mL Oral Q6H PRN Patricia Steinberg MD      amLODIPine  5 mg Oral Daily Liam Mahan PA-C      citalopram  20 mg Oral Daily Patricia Steinberg MD      clopidogrel  75 mg Oral Daily Patricia Steinberg MD      donepezil  10 mg Oral HS Patricia Steinberg MD      enoxaparin  40 mg Subcutaneous Daily Patricia Steinberg MD      gabapentin  100 mg Oral BID Patricia Steinberg MD      OLANZapine  5 mg Oral Q3H PRN Liam Mahan PA-C      ondansetron  4 mg Intravenous Q6H PRN Patricia Steinberg MD      polyethylene glycol  17 g Oral Daily PRN Patricia Steinberg MD      predniSONE  10 mg Oral Daily Patricia Steinberg MD      QUEtiapine  100 mg Oral HS Patricia Steinberg MD          Today, Patient Was Seen By: Osmany Dover PA-C    **Please Note: This note may have been constructed using a voice recognition system  **

## 2022-09-16 NOTE — PLAN OF CARE
Problem: SAFETY ADULT  Goal: Patient will remain free of falls  Description: INTERVENTIONS:  - Educate patient/family on patient safety including physical limitations  - Instruct patient to call for assistance with activity   - Consult OT/PT to assist with strengthening/mobility   - Keep Call bell within reach  - Keep bed low and locked with side rails adjusted as appropriate  - Keep care items and personal belongings within reach  - Initiate and maintain comfort rounds  - Make Fall Risk Sign visible to staff  - Offer Toileting every 2 Hours, in advance of need  - Initiate/Maintain bed/chair alarm  - Obtain necessary fall risk management equipment: walker  - Apply yellow socks and bracelet for high fall risk patients  - Consider moving patient to room near nurses station  Outcome: Progressing  Goal: Maintain or return to baseline ADL function  Description: INTERVENTIONS:  -  Assess patient's ability to carry out ADLs; assess patient's baseline for ADL function and identify physical deficits which impact ability to perform ADLs (bathing, care of mouth/teeth, toileting, grooming, dressing, etc )  - Assess/evaluate cause of self-care deficits   - Assess range of motion  - Assess patient's mobility; develop plan if impaired  - Assess patient's need for assistive devices and provide as appropriate  - Encourage maximum independence but intervene and supervise when necessary  - Involve family in performance of ADLs  - Assess for home care needs following discharge   - Consider OT consult to assist with ADL evaluation and planning for discharge  - Provide patient education as appropriate  Outcome: Progressing  Goal: Maintains/Returns to pre admission functional level  Description: INTERVENTIONS:  - Perform BMAT or MOVE assessment daily    - Set and communicate daily mobility goal to care team and patient/family/caregiver     - Collaborate with rehabilitation services on mobility goals if consulted  - Stand patient 6 times a day  - Ambulate patient 4 times a day  - Out of bed to chair 3 times a day   - Out of bed for meals 3 times a day  - Out of bed for toileting  - Record patient progress and toleration of activity level   Outcome: Progressing     Problem: Knowledge Deficit  Goal: Patient/family/caregiver demonstrates understanding of disease process, treatment plan, medications, and discharge instructions  Description: Complete learning assessment and assess knowledge base    Interventions:  - Provide teaching at level of understanding  - Provide teaching via preferred learning methods  Outcome: Progressing

## 2022-09-16 NOTE — PLAN OF CARE
Problem: INFECTION - ADULT  Goal: Absence or prevention of progression during hospitalization  Description: INTERVENTIONS:  - Assess and monitor for signs and symptoms of infection  - Monitor lab/diagnostic results  - Monitor all insertion sites, i e  indwelling lines, tubes, and drains  - Monitor endotracheal if appropriate and nasal secretions for changes in amount and color  - [place] appropriate cooling/warming therapies per order  - Administer medications as ordered  - Instruct and encourage patient and family to use good hand hygiene technique  - Identify and instruct in appropriate isolation precautions for identified infection/condition  Outcome: Progressing  Goal: Absence of fever/infection during neutropenic period  Description: INTERVENTIONS:  - Monitor WBC    Outcome: Progressing

## 2022-09-16 NOTE — ASSESSMENT & PLAN NOTE
· Family unable to provide appropriate assistance in independent living facility  · Still awaiting option paperwork   CM reports likely here until sometime next week

## 2022-09-16 NOTE — ASSESSMENT & PLAN NOTE
· Has been on Lopressor 50 mg daily prior to admission  · Decreased to 25 mg then continued given persistent bradycardia  · Continue amlodipine 5 mg daily  · Blood pressure remains stable with appropriate heart rate  · Monitor with routine vitals

## 2022-09-16 NOTE — CASE MANAGEMENT
Case Management Discharge Planning Note    Patient name Lele Martinez W /W [id number] MRN [id number]  : 1931 Date 2022       Current Admission Date: 2022  Current Admission Diagnosis:Late onset Alzheimer's disease with behavioral disturbance [facility])   Patient Active Problem List    Diagnosis Date Noted    Inability to return to living situation 09/15/2022    Pressure injury of sacral region, stage 2 ([facility] [address] ) 2022    Buttock wound 2022    Positive blood cultures 2022    UTI (urinary tract infection) 2022    Primary hypertension 10/24/2021    PVD (peripheral vascular disease) ([facility] [address] ) 10/24/2021    Late onset Alzheimer's disease with behavioral disturbance ([facility] ) 2021    Benign prostatic hyperplasia with post-void dribbling 2021    Rheumatoid [facility] with positive [facility] factor ([facility] ) 2021    Agitation due to dementia ([facility] ) 2021      LOS (days): 7  Geometric Mean LOS (GMLOS) (days): 4 50  Days to GMLOS:-2 8     OBJECTIVE:  Risk of Unplanned Readmission Score: 19 94         Current admission status: Inpatient   Preferred Pharmacy:   [address] [address]  Phone: [phone] Fax: [phone]    [facility]/pharmacy #[id number]- [address] [address]  Phone: [phone] Fax: [phone]    Primary Care Provider: No primary care provider on file  Primary Insurance: [facility]  Secondary Insurance:     DISCHARGE DETAILS:    Discharge planning discussed with[de-identified] Ara Amaya from [facility]                                                                                            Additional Comments: CM received a call from Ara Amaya at [facility] who reported she received the progress notes CM faxed and upon review they are unable to accept patient back to his [facility] at this time    CM confirmed that she would be notifying his family of this as he will require SNF LTC placement if they are not accepting him back  Aisha Do reported she had told them this yesterday  CM asked how she could know this before seeing the progress notes and she stated she had a heidi   completed Level of Determination packet and emailed this to [facility]  CM heard back that Magalie Simons will complete the FED  CM department will continue to follow to assist with discharge coordination

## 2022-09-17 PROCEDURE — 99232 SBSQ HOSP IP/OBS MODERATE 35: CPT

## 2022-09-17 RX ADMIN — OLANZAPINE 5 MG: 5 TABLET, ORALLY DISINTEGRATING ORAL at 21:53

## 2022-09-17 RX ADMIN — CITALOPRAM HYDROBROMIDE 20 MG: 20 TABLET ORAL at 09:34

## 2022-09-17 RX ADMIN — CLOPIDOGREL BISULFATE 75 MG: 75 TABLET ORAL at 09:34

## 2022-09-17 RX ADMIN — QUETIAPINE FUMARATE 100 MG: 100 TABLET ORAL at 21:53

## 2022-09-17 RX ADMIN — GABAPENTIN 100 MG: 100 CAPSULE ORAL at 18:43

## 2022-09-17 RX ADMIN — PREDNISONE 10 MG: 10 TABLET ORAL at 09:34

## 2022-09-17 RX ADMIN — AMLODIPINE BESYLATE 5 MG: 5 TABLET ORAL at 09:34

## 2022-09-17 RX ADMIN — ENOXAPARIN SODIUM 40 MG: 40 INJECTION SUBCUTANEOUS at 09:33

## 2022-09-17 RX ADMIN — GABAPENTIN 100 MG: 100 CAPSULE ORAL at 09:34

## 2022-09-17 RX ADMIN — DONEPEZIL HYDROCHLORIDE 10 MG: 5 TABLET ORAL at 21:53

## 2022-09-17 NOTE — PROGRESS NOTES
[facility]  Progress Note - Reuben Palomareso 9/7/1931, 80 y o  male MRN: [id number]  Unit/Bed#: W -01 Encounter: [id number]  Primary Care Provider: No primary care provider on file  Date and time admitted to hospital: 9/8/2022  3:17 PM    * Late onset Alzheimer's disease with behavioral disturbance [facility])  Assessment & Plan  Transferred from [facility] facility [facility] for worsening dementia with agitated features  Patient unaware of where he is and why  · Per patient's daughter, facility told her he had an abscess in his buttocks which required evaluation  · Continue Seroquel and Zyprexa as needed  · Patient was in restraints twice throughout hospitalization currently off restraints   · PT/OT recommending post acute rehab CM following awaiting option paperwork    Rheumatoid arthritis with positive rheumatoid factor ([facility] [address] )  Assessment & Plan  · On chronic steroids, will continue  · Follow-up with rheumatology outpatient    Pressure injury of sacral region, stage 2 [facility])  Assessment & Plan  · Wound care nurse following, continue supportive care  · Offloading and local wound care    Primary hypertension  Assessment & Plan  · Has been on Lopressor 50 mg daily prior to admission  · Decreased to 25 mg then discontinued given persistent bradycardia  · Continue amlodipine 5 mg daily  · -140 remains stable with appropriate heart rate  · Monitor with routine vitals    Inability to return to living situation  Assessment & Plan  · Family unable to provide appropriate assistance in independent living facility  · Still awaiting option paperwork  CM reports likely here until sometime next week      VTE Pharmacologic Prophylaxis: VTE Score: 4 Moderate Risk (Score 3-4) - Pharmacological DVT Prophylaxis Ordered: enoxaparin (Lovenox)  Patient Centered Rounds: I performed bedside rounds with nursing staff today    Discussions with Specialists or Other Care Team Provider: none    Education and Discussions with Family / Patient: Updated  (daughter) via phone  Time Spent for Care: 30 minutes  More than 50% of total time spent on counseling and coordination of care as described above  Current Length of Stay: 8 day(s)  Current Patient Status: Inpatient   Certification Statement: The patient will continue to require additional inpatient hospital stay due to unsafe discharge seeking palcement  Discharge Plan: Anticipate discharge in 48 hrs to rehab facility  Code Status: Level 1 - Full Code    Subjective:   Patient was seen laying in bed today while eating breakfast  He denies having any pain  He reports he is tired today  He denies any chest pain ,SOB, abdominal pain, nausea, vomiting  Objective:     Vitals:   Temp (24hrs), Av 7 °F (36 5 °C), Min:97 2 °F (36 2 °C), Max:98 3 °F (36 8 °C)    Temp:  [97 2 °F (36 2 °C)-98 3 °F (36 8 °C)] 98 3 °F (36 8 °C)  HR:  [62-70] 62  Resp:  [18] 18  BP: (135-144)/(63-65) 138/63  SpO2:  [94 %-95 %] 95 %  There is no height or weight on file to calculate BMI  Input and Output Summary (last 24 hours):   No intake or output data in the 24 hours ending 22 1240    Physical Exam:   Physical Exam  Vitals and nursing note reviewed  Constitutional:       General: He is not in acute distress  Appearance: Normal appearance  HENT:      Head: Normocephalic and atraumatic  Eyes:      General: No scleral icterus  Cardiovascular:      Rate and Rhythm: Normal rate and regular rhythm  Pulmonary:      Effort: Pulmonary effort is normal       Breath sounds: Normal breath sounds  No wheezing or rales  Abdominal:      General: Bowel sounds are normal       Palpations: Abdomen is soft  Tenderness: There is no abdominal tenderness  Musculoskeletal:      Right lower leg: No edema  Left lower leg: No edema  Skin:     General: Skin is warm and dry  Neurological:      Mental Status: He is alert  Mental status is at baseline   He is disoriented  Psychiatric:         Mood and Affect: Mood normal          Behavior: Behavior normal          Additional Data:     Labs:                            Lines/Drains:  Invasive Devices  Report    None                       Imaging: No pertinent imaging reviewed  Recent Cultures (last 7 days):         Last 24 Hours Medication List:   Current Facility-Administered Medications   Medication Dose Route Frequency Provider Last Rate    acetaminophen  650 mg Oral Q6H PRN Rachell Raygoza MD      aluminum-magnesium hydroxide-simethicone  30 mL Oral Q6H PRN Rachell Raygoza MD      amLODIPine  5 mg Oral Daily Dylan Tim PA-C      citalopram  20 mg Oral Daily Rachell Raygoza MD      clopidogrel  75 mg Oral Daily Rachell Raygoza MD      donepezil  10 mg Oral HS Rachell Raygoza MD      enoxaparin  40 mg Subcutaneous Daily Rachell Raygoza MD      gabapentin  100 mg Oral BID Rachell Raygoza MD      OLANZapine  5 mg Oral Q3H PRN Dylan Tim PA-C      ondansetron  4 mg Intravenous Q6H PRN Rachell Raygoza MD      polyethylene glycol  17 g Oral Daily PRN Rachell Raygoza MD      predniSONE  10 mg Oral Daily Rachell Raygoza MD      QUEtiapine  100 mg Oral HS Rachell Raygoza MD          Today, Patient Was Seen By: Perry Forbse PA-C    **Please Note: This note may have been constructed using a voice recognition system  **

## 2022-09-17 NOTE — PLAN OF CARE
Problem: INFECTION - ADULT  Goal: Absence or prevention of progression during hospitalization  Description: INTERVENTIONS:  - Assess and monitor for signs and symptoms of infection  - Monitor lab/diagnostic results  - Monitor all insertion sites, i e  indwelling lines, tubes, and drains  - Monitor endotracheal if appropriate and nasal secretions for changes in amount and color  - [place] appropriate cooling/warming therapies per order  - Administer medications as ordered  - Instruct and encourage patient and family to use good hand hygiene technique  - Identify and instruct in appropriate isolation precautions for identified infection/condition  Outcome: Progressing  Goal: Absence of fever/infection during neutropenic period  Description: INTERVENTIONS:  - Monitor WBC    Outcome: Progressing

## 2022-09-17 NOTE — PLAN OF CARE
Problem: Prexisting or High Potential for Compromised Skin Integrity  Goal: Skin integrity is maintained or improved  Description: INTERVENTIONS:  - Identify patients at risk for skin breakdown  - Assess and monitor skin integrity  - Assess and monitor nutrition and hydration status  - Monitor labs   - Assess for incontinence   - Turn and reposition patient  - Assist with mobility/ambulation  - Relieve pressure over bony prominences  - Avoid friction and shearing  - Provide appropriate hygiene as needed including keeping skin clean and dry  - Evaluate need for skin moisturizer/barrier cream  - Collaborate with interdisciplinary team   - Patient/family teaching  - Consider wound care consult   Outcome: Progressing     Problem: MOBILITY - ADULT  Goal: Maintain or return to baseline ADL function  Description: INTERVENTIONS:  -  Assess patient's ability to carry out ADLs; assess patient's baseline for ADL function and identify physical deficits which impact ability to perform ADLs (bathing, care of mouth/teeth, toileting, grooming, dressing, etc )  - Assess/evaluate cause of self-care deficits   - Assess range of motion  - Assess patient's mobility; develop plan if impaired  - Assess patient's need for assistive devices and provide as appropriate  - Encourage maximum independence but intervene and supervise when necessary  - Involve family in performance of ADLs  - Assess for home care needs following discharge   - Consider OT consult to assist with ADL evaluation and planning for discharge  - Provide patient education as appropriate  Outcome: Progressing     Problem: Nutrition/Hydration-ADULT  Goal: Nutrient/Hydration intake appropriate for improving, restoring or maintaining nutritional needs  Description: Monitor and assess patient's nutrition/hydration status for malnutrition  Collaborate with interdisciplinary team and initiate plan and interventions as ordered    Monitor patient's weight and dietary intake as ordered or per policy  Utilize nutrition screening tool and intervene as necessary  Determine patient's food preferences and provide high-protein, high-caloric foods as appropriate       INTERVENTIONS:  - Monitor oral intake, urinary output, labs, and treatment plans  - Assess nutrition and hydration status and recommend course of action  - Evaluate amount of meals eaten  - Assist patient with eating if necessary   - Allow adequate time for meals  - Recommend/ encourage appropriate diets, oral nutritional supplements, and vitamin/mineral supplements  - Order, calculate, and assess calorie counts as needed  - Recommend, monitor, and adjust tube feedings and TPN/PPN based on assessed needs  - Assess need for intravenous fluids  - Provide specific nutrition/hydration education as appropriate  - Include patient/family/caregiver in decisions related to nutrition  Outcome: Progressing

## 2022-09-18 LAB
ANION GAP SERPL CALCULATED.3IONS-SCNC: 4 MMOL/L (ref 4–13)
BUN SERPL-MCNC: 36 MG/DL (ref 5–25)
CALCIUM SERPL-MCNC: 8 MG/DL (ref 8.4–10.2)
CHLORIDE SERPL-SCNC: 109 MMOL/L (ref 96–108)
CO2 SERPL-SCNC: 28 MMOL/L (ref 21–32)
CREAT SERPL-MCNC: 1.54 MG/DL (ref 0.6–1.3)
GFR SERPL CREATININE-BSD FRML MDRD: 38 ML/MIN/1.73SQ M
GLUCOSE SERPL-MCNC: 89 MG/DL (ref 65–140)
POTASSIUM SERPL-SCNC: 4.7 MMOL/L (ref 3.5–5.3)
SODIUM SERPL-SCNC: 141 MMOL/L (ref 135–147)

## 2022-09-18 PROCEDURE — 80048 BASIC METABOLIC PNL TOTAL CA: CPT

## 2022-09-18 PROCEDURE — 99232 SBSQ HOSP IP/OBS MODERATE 35: CPT | Performed by: PHYSICIAN ASSISTANT

## 2022-09-18 RX ADMIN — OLANZAPINE 5 MG: 5 TABLET, ORALLY DISINTEGRATING ORAL at 23:08

## 2022-09-18 RX ADMIN — PREDNISONE 10 MG: 10 TABLET ORAL at 09:18

## 2022-09-18 RX ADMIN — QUETIAPINE FUMARATE 100 MG: 100 TABLET ORAL at 21:33

## 2022-09-18 RX ADMIN — CLOPIDOGREL BISULFATE 75 MG: 75 TABLET ORAL at 09:18

## 2022-09-18 RX ADMIN — DONEPEZIL HYDROCHLORIDE 10 MG: 5 TABLET ORAL at 21:33

## 2022-09-18 RX ADMIN — ENOXAPARIN SODIUM 40 MG: 40 INJECTION SUBCUTANEOUS at 09:18

## 2022-09-18 RX ADMIN — CITALOPRAM HYDROBROMIDE 20 MG: 20 TABLET ORAL at 09:18

## 2022-09-18 RX ADMIN — GABAPENTIN 100 MG: 100 CAPSULE ORAL at 09:18

## 2022-09-18 RX ADMIN — GABAPENTIN 100 MG: 100 CAPSULE ORAL at 17:20

## 2022-09-18 RX ADMIN — AMLODIPINE BESYLATE 5 MG: 5 TABLET ORAL at 09:18

## 2022-09-18 NOTE — PROGRESS NOTES
[facility]  Progress Note - Edmond Madrid 9/7/1931, 80 y o  male MRN: [id number]  Unit/Bed#: W -01 Encounter: [id number]  Primary Care Provider: No primary care provider on file  Date and time admitted to hospital: 9/8/2022  3:17 PM    * Late onset Alzheimer's disease with behavioral disturbance ([facility] [address] )  Assessment & Plan  · [facility] ER for worsening dementia with agitated features  · Continue Seroquel and Zyprexa as needed (used one dose last night, 2 the day prior)  · Patient was in restraints twice throughout hospitalization currently off restraints   · PT/OT recommending post acute rehab  · CM following - awaiting optioning process  · Continue aricept  · Continue redirection  · Has been mainly calm and cooperative last several days    Rheumatoid arthritis with positive rheumatoid factor ([facility] [address] )  Assessment & Plan  · On chronic steroids with 10 mg daily prednisone  · Follow-up with rheumatology outpatient    Pressure injury of sacral region, stage 2 ([facility] [address] )  Assessment & Plan  · POA  · Offloading and local wound care    Primary hypertension  Assessment & Plan  · Had been on Lopressor 50 mg daily prior to admission  · Decreased to 25 mg then discontinued given persistent bradycardia  · Continue amlodipine 5 mg daily  · -130      VTE Pharmacologic Prophylaxis: VTE Score: 4 Moderate Risk (Score 3-4) - Pharmacological DVT Prophylaxis Ordered: enoxaparin (Lovenox)  Patient Centered Rounds: I performed bedside rounds with nursing staff today  Discussions with Specialists or Other Care Team Provider: nursing    Education and Discussions with Family / Patient: Updated  (daughter) via phone  [phone]    Time Spent for Care: 30 minutes  More than 50% of total time spent on counseling and coordination of care as described above      Current Length of Stay: 9 day(s)  Current Patient Status: Inpatient   Certification Statement: The patient will continue to require additional inpatient hospital stay due to awaiting optioning process, safe d/c plan  Discharge Plan: Anticipate discharge tomorrow to rehab facility  pending optioning     Code Status: Level 1 - Full Code    Subjective:   No current complaints  No nausea or vomiting  No chest pain or shortness of breath  Nursing staff reports that the patient has been doing fairly well  He has not been agitated  Objective:     Vitals:   Temp (24hrs), Av °F (36 7 °C), Min:97 5 °F (36 4 °C), Max:98 5 °F (36 9 °C)    Temp:  [97 5 °F (36 4 °C)-98 5 °F (36 9 °C)] 97 5 °F (36 4 °C)  HR:  [53-71] 53  Resp:  [16-17] 17  BP: (126-142)/() 126/69  SpO2:  [94 %-96 %] 94 %  There is no height or weight on file to calculate BMI  Input and Output Summary (last 24 hours):   No intake or output data in the 24 hours ending 22 1226    Physical Exam:   Physical Exam  Vitals and nursing note reviewed  Constitutional:       General: He is not in acute distress  Appearance: Normal appearance  He is obese  He is ill-appearing  He is not diaphoretic  HENT:      Head: Normocephalic and atraumatic  Cardiovascular:      Rate and Rhythm: Normal rate and regular rhythm  Pulmonary:      Effort: Pulmonary effort is normal       Breath sounds: Normal breath sounds  No stridor  No wheezing, rhonchi or rales  Abdominal:      General: Bowel sounds are normal       Palpations: Abdomen is soft  There is no mass  Tenderness: There is no abdominal tenderness  There is no guarding  Musculoskeletal:      Right lower leg: No edema  Left lower leg: No edema  Skin:     General: Skin is warm and dry  Neurological:      Mental Status: He is alert  Comments: Awake, alert, oriented to person and the fact that he is in the hospital however not to time  Unable to identify the president  unable to name the year     Psychiatric:         Mood and Affect: Mood normal          Behavior: Behavior normal  Additional Data:     Labs:      Results from last 7 days   Lab Units 09/18/22  0536   SODIUM mmol/L 141   POTASSIUM mmol/L 4 7   CHLORIDE mmol/L 109*   CO2 mmol/L 28   BUN mg/dL 36*   CREATININE mg/dL 1 54*   ANION GAP mmol/L 4   CALCIUM mg/dL 8 0*   GLUCOSE RANDOM mg/dL 89                       Lines/Drains:  Invasive Devices  Report    None                       Imaging: No pertinent imaging reviewed  Recent Cultures (last 7 days):         Last 24 Hours Medication List:   Current Facility-Administered Medications   Medication Dose Route Frequency Provider Last Rate    acetaminophen  650 mg Oral Q6H PRN Carmen Byrne MD      aluminum-magnesium hydroxide-simethicone  30 mL Oral Q6H PRN Carmen Byrne MD      amLODIPine  5 mg Oral Daily Valeria Carbajal PA-C      citalopram  20 mg Oral Daily Carmen Byrne MD      clopidogrel  75 mg Oral Daily Carmen Byrne MD      donepezil  10 mg Oral HS Carmen Byrne MD      enoxaparin  40 mg Subcutaneous Daily Carmen Byrne MD      gabapentin  100 mg Oral BID Carmen Byrne MD      OLANZapine  5 mg Oral Q3H PRN Valeria Carbajal PA-C      ondansetron  4 mg Intravenous Q6H PRN Carmen Byrne MD      polyethylene glycol  17 g Oral Daily PRN Carmen Byrne MD      predniSONE  10 mg Oral Daily Carmen Byrne MD      QUEtiapine  100 mg Oral HS Carmen Byrne MD          Today, Patient Was Seen By: Gerald Saeed PA-C    **Please Note: This note may have been constructed using a voice recognition system  **

## 2022-09-18 NOTE — ASSESSMENT & PLAN NOTE
· Sent from [facility] to [facility] ER for worsening dementia with agitated features    · Continue Seroquel and Zyprexa as needed (used one dose last night, 2 the day prior)  · Patient was in restraints twice throughout hospitalization currently off restraints   · PT/OT recommending post acute rehab  · CM following - awaiting optioning process  · Continue aricept  · Continue redirection  · Has been mainly calm and cooperative last several days

## 2022-09-18 NOTE — ASSESSMENT & PLAN NOTE
· Had been on Lopressor 50 mg daily prior to admission  · Decreased to 25 mg then discontinued given persistent bradycardia  · Continue amlodipine 5 mg daily  · -130

## 2022-09-18 NOTE — PLAN OF CARE
Problem: INFECTION - ADULT  Goal: Absence or prevention of progression during hospitalization  Description: INTERVENTIONS:  - Assess and monitor for signs and symptoms of infection  - Monitor lab/diagnostic results  - Monitor all insertion sites, i e  indwelling lines, tubes, and drains  - Monitor endotracheal if appropriate and nasal secretions for changes in amount and color  - [place] appropriate cooling/warming therapies per order  - Administer medications as ordered  - Instruct and encourage patient and family to use good hand hygiene technique  - Identify and instruct in appropriate isolation precautions for identified infection/condition  Outcome: Progressing  Goal: Absence of fever/infection during neutropenic period  Description: INTERVENTIONS:  - Monitor WBC    Outcome: Progressing

## 2022-09-19 PROCEDURE — 97530 THERAPEUTIC ACTIVITIES: CPT

## 2022-09-19 PROCEDURE — 97116 GAIT TRAINING THERAPY: CPT

## 2022-09-19 PROCEDURE — 97535 SELF CARE MNGMENT TRAINING: CPT

## 2022-09-19 PROCEDURE — 99232 SBSQ HOSP IP/OBS MODERATE 35: CPT | Performed by: PHYSICIAN ASSISTANT

## 2022-09-19 RX ADMIN — CLOPIDOGREL BISULFATE 75 MG: 75 TABLET ORAL at 09:17

## 2022-09-19 RX ADMIN — PREDNISONE 10 MG: 10 TABLET ORAL at 09:17

## 2022-09-19 RX ADMIN — CITALOPRAM HYDROBROMIDE 20 MG: 20 TABLET ORAL at 09:17

## 2022-09-19 RX ADMIN — DONEPEZIL HYDROCHLORIDE 10 MG: 5 TABLET ORAL at 20:53

## 2022-09-19 RX ADMIN — OLANZAPINE 5 MG: 5 TABLET, ORALLY DISINTEGRATING ORAL at 20:53

## 2022-09-19 RX ADMIN — GABAPENTIN 100 MG: 100 CAPSULE ORAL at 09:17

## 2022-09-19 RX ADMIN — ENOXAPARIN SODIUM 40 MG: 40 INJECTION SUBCUTANEOUS at 09:17

## 2022-09-19 RX ADMIN — GABAPENTIN 100 MG: 100 CAPSULE ORAL at 17:31

## 2022-09-19 RX ADMIN — QUETIAPINE FUMARATE 100 MG: 100 TABLET ORAL at 20:53

## 2022-09-19 NOTE — PLAN OF CARE
Problem: PHYSICAL THERAPY ADULT  Goal: Performs mobility at highest level of function for planned discharge setting  See evaluation for individualized goals  Description: Treatment/Interventions: Functional transfer training, LE strengthening/ROM, Therapeutic exercise, Endurance training, Cognitive reorientation, Equipment eval/education, Patient/family training, Bed mobility, Gait training  Equipment Recommended: Jose Enrique Núñez       See flowsheet documentation for full assessment, interventions and recommendations  Outcome: Not Progressing  Note: Prognosis: Fair  Problem List: Decreased strength, Impaired balance, Decreased mobility, Decreased cognition, Decreased safety awareness, Impaired judgement, Decreased skin integrity  Assessment: pt began tx session seated OOB in the recliner and was agreeable to participate in PT intervention  pt continues to remain consistant with /s and VC's for hand placement for safety and balance while ascending to rW and descending back into recliner  pt continues to be impulsive throughout tx session as pt abandoned RW twice while ambulating in todays tx session and while performing  stand<>sit recliner transfer  pt ambulated 110'x1 RW with min Ax1 and was limited due to a decrease in Sp02 76% and DE of v36  RN Elvira Grewal was made aware  pt was able to perform 5 STS in todays tx session in order to work on hand placement for safety and balance and in order to strengthen LE's and increase endurancxe and safety with all functional transfers  post tx pt in recliner with call bell and all pt needs met  pt would benefit from continued skilled PT intervention in order to increase balance and ambulation distance  PT Discharge Recommendation: Post acute rehabilitation services (vs return to facility with PT services)    See flowsheet documentation for full assessment

## 2022-09-19 NOTE — PHYSICAL THERAPY NOTE
PHYSICAL THERAPY NOTE          Patient Name: Edmond ZEPEDA Date: 9/19/2022     Correct PT intervention time was 13:30 TO 13:55     09/19/22 1354   PT Last Visit   PT Visit Date 09/19/22   Note Type   Note Type Treatment for insurance authorization   Pain Assessment   Pain Assessment Tool 0-10   Pain Score No Pain   Pain Location/Orientation Orientation: Bilateral;Location: Foot   Effect of Pain on Daily Activities limited activity tolerance and ambulation distance   Patient's Stated Pain Goal No pain   Hospital Pain Intervention(s) Repositioned; Ambulation/increased activity; Emotional support; Rest   Multiple Pain Sites No   Pain Rating: FLACC (Rest) - Face 0   Pain Rating: FLACC (Rest) - Legs 0   Pain Rating: FLACC (Rest) - Activity 0   Pain Rating: FLACC (Rest) - Cry 0   Pain Rating: FLACC (Rest) - Consolability 0   Score: FLACC (Rest) 0   Pain Rating: FLACC (Activity) - Face 1   Pain Rating: FLACC (Activity) - Legs 0   Pain Rating: FLACC (Activity) - Activity 0   Pain Rating: FLACC (Activity) - Cry 0   Pain Rating: FLACC (Activity) - Consolability 0   Score: FLACC (Activity) 1   Restrictions/Precautions   Weight Bearing Precautions Per Order No   Other Precautions Impulsive;Cognitive; Chair Alarm; Bed Alarm; Fall Risk   General   Chart Reviewed Yes   Additional Pertinent History pt Sp02 decreased to 76% with ambulation and DE decreased to 36  rW Hipolito made aware and pt returned to recliner for therapeutic seated rest break  pt required 2 minutes to recover back to Sp02 94% and DE 63%   Response to Previous Treatment Patient with no complaints from previous session  Family/Caregiver Present No   Cognition   Overall Cognitive Status Impaired   Arousal/Participation Alert; Responsive; Cooperative   Attention Attends with cues to redirect   Orientation Level Oriented to person;Oriented to place;Oriented to situation   Memory Decreased short term memory;Decreased recall of recent events;Decreased recall of precautions   Following Commands Follows one step commands with increased time or repetition   Comments max VC for safety and balance while performing functional transfers and ambulation with RW `as pt was impilsive and abandoned rW several times   Subjective   Subjective pt was agreeable to participate in PT intervention   Bed Mobility   Rolling R Unable to assess   Rolling L Unable to assess   Supine to Sit Unable to assess   Sit to Supine Unable to assess   Additional Comments pt seated OOB in the recliner pre/post tx session   Transfers   Sit to Stand 5  Supervision   Additional items Assist x 1; Armrests; Increased time required;Verbal cues  (w/ RW)   Stand to Sit 5  Supervision   Additional items Assist x 1; Armrests; Increased time required;Verbal cues  (w/ RW)   Stand pivot 5  Supervision   Additional items Assist x 1; Increased time required;Armrests; Verbal cues   Toilet transfer Unable to assess   Additional Comments pt continues to require rW and VC's for safety and balance as pt continues to be impulsive and abandon RW with functional transfers and ambulation   Ambulation/Elevation   Gait pattern Decreased foot clearance; Short stride; Excessively slow;Decreased toe off   Gait Assistance 4  Minimal assist   Additional items Assist x 1;Verbal cues; Tactile cues   Assistive Device Rolling walker   Distance 110'x1 RW   Ambulation/Elevation Additional Comments pt required min Ax1 for safety and balance a spt also required tactile cues and RW management as pt abandoned RW while ambulation and completing a stand,>sit transfer back into recliner   Balance   Static Sitting Fair +   Dynamic Sitting Fair   Static Standing Fair -   Dynamic Standing Poor +   Ambulatory Poor +   Endurance Deficit   Endurance Deficit Yes   Endurance Deficit Description pt limited due to decreased Sp02 76% after ambulation with RW   Activity Tolerance   Activity Tolerance Patient limited by fatigue   Nurse Made Aware Spoke to RN Hipolito   Exercises   Hip Abduction Sitting;20 reps;AROM; Bilateral   Hip Adduction Sitting;20 reps;AROM; Bilateral  (pillow squeezes)   Knee AROM Long Arc Quad Sitting;20 reps;AROM; Bilateral   Ankle Pumps Sitting;20 reps;AROM; Bilateral   Marching Sitting;20 reps;AROM; Bilateral   Assessment   Prognosis Fair   Problem List Decreased strength; Impaired balance;Decreased mobility; Decreased cognition;Decreased safety awareness; Impaired judgement;Decreased skin integrity   Assessment pt began tx session seated OOB in the recliner and was agreeable to participate in PT intervention  pt continues to remain consistant with /s and VC's for hand placement for safety and balance while ascending to rW and descending back into recliner  pt continues to be impulsive throughout tx session as pt abandoned RW twice while ambulating in [facility] tx session and while performing  stand<>sit recliner transfer  pt ambulated 110'x1 RW with min Ax1 and was limited due to a decrease in Sp02 76% and LA of v36  RN Karlee Connor was made aware  pt was able to perform 5 STS in [facility] tx session in order to work on hand placement for safety and balance and in order to strengthen LE's and increase endurancxe and safety with all functional transfers  post tx pt in recliner with call bell and all pt needs met  pt would benefit from continued skilled PT intervention in order to increase balance and ambulation distance  Goals   Patient Goals to sit back into recliner   STG Expiration Date 09/20/22   PT Treatment Day 4   Plan   Treatment/Interventions Functional transfer training;LE strengthening/ROM; Therapeutic exercise; Endurance training;Cognitive reorientation;Patient/family training;Equipment eval/education; Bed mobility;Gait training;Spoke to nursing   Progress Progressing toward goals   PT Frequency 3-5x/wk   Recommendation   PT Discharge Recommendation Post acute rehabilitation services  (vs return to facility with PT services)   Equipment Recommended [place] walker   Change/add to [employer]? No   AM-PAC Basic Mobility Inpatient   Turning in Bed Without Bedrails 3   Lying on Back to Sitting on Edge of Flat Bed 3   Moving Bed to Chair 3   Standing Up From Chair 3   Walk in Room 3   Climb 3-5 Stairs 1   Basic Mobility Inpatient Raw Score 16   Basic Mobility Standardized Score 38 32   Highest Level Of Mobility   JH-HLM Goal 5: Stand one or more mins   JH-HLM Achieved 7: Walk 25 feet or more   Education   Education Provided Mobility training;Assistive device   Patient Reinforcement needed   End of Consult   Patient Position at End of Consult Bedside chair;Bed/Chair alarm activated; All needs within reach   The patient's AM-PAC Basic Mobility Inpatient Short Form Raw Score is 16  A Raw score of less than or equal to 16 suggests the patient may benefit from discharge to post-acute rehabilitation services  Please also refer to the recommendation of the Physical Therapist for safe discharge planning        Ana Cristina Cardenas, PTA

## 2022-09-19 NOTE — PLAN OF CARE
Problem: OCCUPATIONAL THERAPY ADULT  Goal: Performs self-care activities at highest level of function for planned discharge setting  See evaluation for individualized goals  Description: Treatment Interventions: ADL retraining, Functional transfer training, Endurance training, Cognitive reorientation, Patient/family training, Equipment evaluation/education, Compensatory technique education, Continued evaluation, Energy conservation, Activityengagement          See flowsheet documentation for full assessment, interventions and recommendations  Outcome: Progressing  Note: Limitation: Decreased ADL status, Decreased Safe judgement during ADL, Decreased cognition, Decreased endurance, Decreased high-level ADLs, Decreased self-care trans  Prognosis: Good  Assessment: Patient participated in Skilled OT session this date with interventions consisting of ADL re training with the use of correct body mechnaics, Energy Conservation techniques, Work simplification skills , deep breathing technique, safety awareness and fall prevention techniques, increase standing tolerance time with unilateral UE support to complete sink level ADLs, elicit righting and equilibrium reactions for improved postural control and alignment during transitional movements, increase dynamic sit/ stand balance during functional activity  and increase OOB/ sitting tolerance   Patient agreeable to OT treatment session, upon arrival patient was found supine in bed  In comparison to previous session, patient with improvements in participation in ADLs however pt continued to be limited by cognition and deconditioning  Pt able to ambulate short household distance to bathroom, but required increased assist on return trip due to fatigue and SOB  Frequent redirection to task and VC for safety   Patient requiring frequent re direction, verbal cues for safety, verbal cues for correct technique, cognitive assistance to anticipate next step, one step directives, frequent rest periods and ocassional safety reminders  Patient continues to be functioning below baseline level, occupational performance remains limited secondary to factors listed above and increased risk for falls and injury  From OT standpoint, recommendation at time of d/c would be Post acute rehabilitation services  Patient to benefit from continued Occupational Therapy treatment while in the hospital to address deficits as defined above and maximize level of functional independence with ADLs and functional mobility       OT Discharge Recommendation: Post acute rehabilitation services     Cristy Garcia

## 2022-09-19 NOTE — PROGRESS NOTES
[facility]  Progress Note - [place] Delay 9/7/1931, 80 y o  male MRN: [id number]  Unit/Bed#: W -01 Encounter: [id number]  Primary Care Provider: No primary care provider on file  Date and time admitted to hospital: 9/8/2022  3:17 PM    * Late onset Alzheimer's disease with behavioral disturbance ([facility] [address] )  Assessment & Plan  · [facility] from [facility] for worsening dementia with agitated features  · Continue Seroquel and Zyprexa as needed  · Patient was in restraints twice throughout hospitalization, currently off restraints  · PT/OT recommending post acute rehab  · CM following - awaiting optioning process  · Continue aricept  · Continue redirection  · Has been mainly calm and cooperative last several days    Inability to return to living situation  Assessment & Plan  · Family unable to provide appropriate assistance in independent living facility  · Still awaiting option paperwork  CM reports likely here until sometime next week    Pressure injury of sacral region, stage 2 ([facility] [address] )  Assessment & Plan  · POA  · Offloading and local wound care    Primary hypertension  Assessment & Plan  · Had been on Lopressor 50 mg daily prior to admission  · Decreased to 25 mg then discontinued given persistent bradycardia  · Continue amlodipine 5 mg daily  · -130    Rheumatoid arthritis with positive rheumatoid factor (HCC)  Assessment & Plan  · On chronic steroids with 10 mg daily prednisone  · Follow-up with rheumatology outpatient        VTE Pharmacologic Prophylaxis: VTE Score: 4 Moderate Risk (Score 3-4) - Pharmacological DVT Prophylaxis Ordered: enoxaparin (Lovenox)  Patient Centered Rounds: I performed bedside rounds with nursing staff today  Discussions with Specialists or Other Care Team Provider: cm, nursing    Education and Discussions with Family / Patient: Updated  (daughter) via phone  Time Spent for Care: 30 minutes   More than 50% of total time spent on counseling and coordination of care as described above  Current Length of Stay: 10 day(s)  Current Patient Status: Inpatient   Certification Statement: The patient will continue to require additional inpatient hospital stay due to awaiting placement  Discharge Plan: Anticipate discharge in 24-48 hrs to rehab facility  Code Status: Level 1 - Full Code    Subjective:   No overnight events per nursing  Has been redirectable and calm  Off of restraints  Objective:     Vitals:   Temp (24hrs), Av 5 °F (36 4 °C), Min:97 4 °F (36 3 °C), Max:97 5 °F (36 4 °C)    Temp:  [97 4 °F (36 3 °C)-97 5 °F (36 4 °C)] 97 4 °F (36 3 °C)  HR:  [53-63] 63  Resp:  [16-17] 16  BP: (116-146)/(53-69) 146/66  SpO2:  [94 %-96 %] 94 %  There is no height or weight on file to calculate BMI  Input and Output Summary (last 24 hours):   No intake or output data in the 24 hours ending 22 0740    Physical Exam:   Physical Exam  Vitals and nursing note reviewed  Constitutional:       General: He is not in acute distress  Appearance: Normal appearance  He is not ill-appearing or diaphoretic  HENT:      Head: Normocephalic and atraumatic  Cardiovascular:      Rate and Rhythm: Normal rate and regular rhythm  Pulmonary:      Effort: Pulmonary effort is normal       Breath sounds: Normal breath sounds  No stridor  No wheezing, rhonchi or rales  Abdominal:      General: Bowel sounds are normal       Palpations: Abdomen is soft  There is no mass  Tenderness: There is no abdominal tenderness  There is no guarding  Musculoskeletal:      Right lower leg: No edema  Left lower leg: No edema  Skin:     General: Skin is warm and dry  Neurological:      Mental Status: He is alert        Comments: Awake, alert, oriented to person and the fact that he is in the hospital  Does not know year or why he is in hospital    Psychiatric:         Mood and Affect: Mood normal          Behavior: Behavior normal  Additional Data:     Labs:      Results from last 7 days   Lab Units 09/18/22  0536   SODIUM mmol/L 141   POTASSIUM mmol/L 4 7   CHLORIDE mmol/L 109*   CO2 mmol/L 28   BUN mg/dL 36*   CREATININE mg/dL 1 54*   ANION GAP mmol/L 4   CALCIUM mg/dL 8 0*   GLUCOSE RANDOM mg/dL 89                       Lines/Drains:  Invasive Devices  Report    None                       Imaging: No pertinent imaging reviewed  Recent Cultures (last 7 days):         Last 24 Hours Medication List:   Current Facility-Administered Medications   Medication Dose Route Frequency Provider Last Rate    acetaminophen  650 mg Oral Q6H PRN Pancho Ardon MD      aluminum-magnesium hydroxide-simethicone  30 mL Oral Q6H PRN Pancho Ardon MD      amLODIPine  5 mg Oral Daily Amadou Watson PA-C      citalopram  20 mg Oral Daily Pancho Ardon MD      clopidogrel  75 mg Oral Daily Pancho Ardon MD      donepezil  10 mg Oral HS Pancho Ardon MD      enoxaparin  40 mg Subcutaneous Daily Pancho Ardon MD      gabapentin  100 mg Oral BID Pancho Ardon MD      OLANZapine  5 mg Oral Q3H PRN Amadou Watson PA-C      ondansetron  4 mg Intravenous Q6H PRN Pancho Ardon MD      polyethylene glycol  17 g Oral Daily PRN Pancho Ardon MD      predniSONE  10 mg Oral Daily Pancho Ardon MD      QUEtiapine  100 mg Oral HS Pancho Ardon MD          Today, Patient Was Seen By: Bertha Barraza PA-C    **Please Note: This note may have been constructed using a voice recognition system  **

## 2022-09-19 NOTE — OCCUPATIONAL THERAPY NOTE
OT TREATMENT    The patient's raw score on the AM-PAC Daily Activity inpatient short form is 17, standardized score is 37 26, less than 39 4  Patients at this level are likely to benefit from DC to post-acute rehabilitation services  Please refer to the recommendation of the Occupational Therapist for safe DC planning      09/19/22 0808   OT Last Visit   OT Visit Date 09/19/22   Note Type   Note Type Treatment for insurance authorization   Restrictions/Precautions   Weight Bearing Precautions Per Order No   Other Precautions Impulsive;Cognitive; Chair Alarm; Bed Alarm; Fall Risk   General   Response to Previous Treatment Patient with no complaints from previous session   Lifestyle   Autonomy Pt resides at [facility] and requires A for ADL completion   Reciprocal Relationships Supportive facility staff and daughter   Service to Others Retired   Intrinsic Gratification Pt reports he enjoys painting   Pain Assessment   Pain Assessment Tool 0-10   Pain Score No Pain   ADL   Eating Assistance 5  Supervision/Setup   Eating Deficit Setup; Beverage management   Eating Comments setup with breakfast tray at end of session  Able to open containers  Grooming Assistance 4  Minimal Assistance   Grooming Deficit Setup;Steadying;Verbal cueing;Supervision/safety; Increased time to complete;Standing with assistive device; Wash/dry hands;Brushing hair   Grooming Comments wash hands standing at sink in bathroom; VC to locate and appropriately use soap dispenser  In stance, pt with increased fatigue after toileting and BLEs began to buckle  Pt reporting "I'm just trying to stand"  Pt required min/mod A x 1 to remain in stance at sink  Return to recliner to complete rest of grooming in sitting     UB Bathing Assistance Unable to assess   UB Bathing Comments pt declined participation in bathing as breakfast tray arrived   LB Bathing Assistance Unable to assess   LB Bathing Comments pt declined participation in bathing as breakfast tray arrived   UB Dressing Assistance 4  Minimal Assistance   UB Dressing Deficit Setup;Verbal cueing;Supervision/safety; Increased time to complete; Thread RUE; Thread LUE;Pull around back   UB Dressing Comments donning gown sitting in recliner; VC for orientation   LB Dressing Assistance 4  Minimal Assistance   LB Dressing Deficit Setup;Steadying;Verbal cueing;Supervision/safety; Increased time to complete; Don/doff R sock; Don/doff L sock   LB Dressing Comments Don socks sitting EOB, + time due to weakness  Toileting Assistance  4  Minimal Assistance   Toileting Deficit Setup;Steadying; Impulsive;Verbal cueing;Supervison/safety; Increased time to complete;Grab bar use;Perineal hygiene   Toileting Comments Pt found to be incontinent of urine upon arrival, unaware  Pt to urinate sitting on toilet in bathroom, VC to initiate bruno care and found to be incontinent of bowel  VC to return to sitting  Bruno care in stance with min A x 1 to maintain safe stance, fatigued easily and increased SOB with activity  SPO2 dropping to 84% (questionable accuracy of reading but pt audibly SOB  Functional Standing Tolerance   Time 2 minutes   Activity standing to perform bruno care   Comments Min A in stance with unilateral support on RW, easily fatigued  Bed Mobility   Supine to Sit 5  Supervision   Additional items Assist x 1; Increased time required;Verbal cues  (+ time and encouragement)   Sit to Supine Unable to assess   Additional Comments Pt OOB to recliner at end of session  Transfers   Sit to Stand 5  Supervision   Additional items Assist x 1; Increased time required; Impulsive;Verbal cues   Stand to Sit 4  Minimal assistance   Additional items Assist x 1; Increased time required; Impulsive;Verbal cues  (Min A x 1 for safe descent as pt impulsive and required VC for safe sitting)   Toilet transfer 4  Minimal assistance   Additional items Assist x 1; Increased time required;Verbal cues;Standard toilet; Impulsive  (VC for use of grab bar R)   Additional Comments MAX VC for safe transfers this session, impulsive   Functional Mobility   Functional Mobility 4  Minimal assistance   Additional Comments Short household distance to bathroom and back with RW with Min (A) x 1  Pt required increased A on return trip due to fatigue and SOB, difficulty managing RW on return  Additional items Rolling walker   Toilet Transfers   Toilet Transfer From [employer] Transfer Type To and from   Toilet Transfer to Standard toilet   Toilet Transfer Technique Ambulating   Toilet Transfers Minimal assistance   Toilet Transfers Comments use of grab bar R with MAX VC   Cognition   Overall Cognitive Status Impaired   Arousal/Participation Alert; Cooperative   Attention Attends with cues to redirect   Orientation Level Oriented to person;Disoriented to place; Disoriented to time;Disoriented to situation   Memory Decreased recall of precautions;Decreased recall of recent events;Decreased short term memory   Following Commands Follows one step commands with increased time or repetition   Comments MAX VC for safety at times, redirection to task as pt quickly forgets goal of task  Poor insight into deficits   Activity Tolerance   Activity Tolerance Patient limited by fatigue; Other (Comment)  (limited by cognition)   Assessment   Assessment Patient participated in Skilled OT session this date with interventions consisting of ADL re training with the use of correct body mechnaics, Energy Conservation techniques, Work simplification skills , deep breathing technique, safety awareness and fall prevention techniques, increase standing tolerance time with unilateral UE support to complete sink level ADLs, elicit righting and equilibrium reactions for improved postural control and alignment during transitional movements, increase dynamic sit/ stand balance during functional activity  and increase OOB/ sitting tolerance    Patient agreeable to OT treatment session, upon arrival patient was found supine in bed  In comparison to previous session, patient with improvements in participation in ADLs however pt continued to be limited by cognition and deconditioning  Pt able to ambulate short household distance to bathroom, but required increased assist on return trip due to fatigue and SOB  Frequent redirection to task and VC for safety  Patient requiring frequent re direction, verbal cues for safety, verbal cues for correct technique, cognitive assistance to anticipate next step, one step directives, frequent rest periods and ocassional safety reminders  Patient continues to be functioning below baseline level, occupational performance remains limited secondary to factors listed above and increased risk for falls and injury  From OT standpoint, recommendation at time of d/c would be Post acute rehabilitation services  Patient to benefit from continued Occupational Therapy treatment while in the hospital to address deficits as defined above and maximize level of functional independence with ADLs and functional mobility  Plan   Treatment Interventions ADL retraining;Functional transfer training; Endurance training;Cognitive reorientation;Patient/family training;Equipment evaluation/education; Compensatory technique education;Continued evaluation; Energy conservation; Activityengagement   Goal Expiration Date 09/22/22   OT Treatment Day 2   OT Frequency 2-3x/wk   Recommendation   OT Discharge Recommendation Post acute rehabilitation services   Additional Comments  At end of session, pt OOB to recliner with all needs met, call bell within reach, + chair alarm and breakfast tray setup     AM-PAC Daily Activity Inpatient   Lower Body Dressing 2   Bathing 2   Toileting 3   Upper Body Dressing 3   Grooming 3   Eating 4   Daily Activity Raw Score 17   Daily Activity Standardized Score (Calc for Raw Score >=11) 37 26   AM-PAC Applied Cognition Inpatient   Following a Speech/Presentation 2   Understanding Ordinary Conversation 3   Taking Medications 1   Remembering Where Things Are Placed or Put Away 2   Remembering List of 4-5 Errands 1   Taking Care of Complicated Tasks 1   Applied Cognition Raw Score 10   Applied Cognition Standardized Score 24 98     Ramon Byrne, OT

## 2022-09-19 NOTE — ASSESSMENT & PLAN NOTE
· Sent from [facility] to [facility] ER for worsening dementia with agitated features  · Continue Seroquel and Zyprexa as needed  · Patient was in restraints twice throughout hospitalization, currently off restraints     · PT/OT recommending post acute rehab  · CM following - awaiting optioning process  · Continue aricept  · Continue redirection  · Has been mainly calm and cooperative last several days

## 2022-09-19 NOTE — PLAN OF CARE
Problem: INFECTION - ADULT  Goal: Absence or prevention of progression during hospitalization  Description: INTERVENTIONS:  - Assess and monitor for signs and symptoms of infection  - Monitor lab/diagnostic results  - Monitor all insertion sites, i e  indwelling lines, tubes, and drains  - Monitor endotracheal if appropriate and nasal secretions for changes in amount and color  - [place] appropriate cooling/warming therapies per order  - Administer medications as ordered  - Instruct and encourage patient and family to use good hand hygiene technique  - Identify and instruct in appropriate isolation precautions for identified infection/condition  Outcome: Progressing  Goal: Absence of fever/infection during neutropenic period  Description: INTERVENTIONS:  - Monitor WBC    Outcome: Progressing     Problem: SAFETY ADULT  Goal: Patient will remain free of falls  Description: INTERVENTIONS:  - Educate patient/family on patient safety including physical limitations  - Instruct patient to call for assistance with activity   - Consult OT/PT to assist with strengthening/mobility   - Keep Call bell within reach  - Keep bed low and locked with side rails adjusted as appropriate  - Keep care items and personal belongings within reach  - Initiate and maintain comfort rounds  - Make Fall Risk Sign visible to staff  - Offer Toileting every 2 Hours, in advance of need  - Initiate/Maintain bed/chair alarm  - Obtain necessary fall risk management equipment: walker  - Apply yellow socks and bracelet for high fall risk patients  - Consider moving patient to room near nurses station  Outcome: Progressing  Goal: Maintain or return to baseline ADL function  Description: INTERVENTIONS:  -  Assess patient's ability to carry out ADLs; assess patient's baseline for ADL function and identify physical deficits which impact ability to perform ADLs (bathing, care of mouth/teeth, toileting, grooming, dressing, etc )  - Assess/evaluate cause of self-care deficits   - Assess range of motion  - Assess patient's mobility; develop plan if impaired  - Assess patient's need for assistive devices and provide as appropriate  - Encourage maximum independence but intervene and supervise when necessary  - Involve family in performance of ADLs  - Assess for home care needs following discharge   - Consider OT consult to assist with ADL evaluation and planning for discharge  - Provide patient education as appropriate  Outcome: Progressing  Goal: Maintains/Returns to pre admission functional level  Description: INTERVENTIONS:  - Perform BMAT or MOVE assessment daily    - Set and communicate daily mobility goal to care team and patient/family/caregiver     - Collaborate with rehabilitation services on mobility goals if consulted  - Stand patient 6 times a day  - Ambulate patient 4 times a day  - Out of bed to chair 3 times a day   - Out of bed for meals 3 times a day  - Out of bed for toileting  - Record patient progress and toleration of activity level   Outcome: Progressing     Problem: DISCHARGE PLANNING  Goal: Discharge to home or other facility with appropriate resources  Description: INTERVENTIONS:  - Identify barriers to discharge w/patient and caregiver  - Arrange for needed discharge resources and transportation as appropriate  - Identify discharge learning needs (meds, wound care, etc )  - Arrange for interpretive services to assist at discharge as needed  - Refer to Case Management Department for coordinating discharge planning if the patient needs post-hospital services based on physician/advanced practitioner order or complex needs related to functional status, cognitive ability, or social support system  Outcome: Progressing     Problem: Knowledge Deficit  Goal: Patient/family/caregiver demonstrates understanding of disease process, treatment plan, medications, and discharge instructions  Description: Complete learning assessment and assess knowledge base   Interventions:  - Provide teaching at level of understanding  - Provide teaching via preferred learning methods  Outcome: Progressing     Problem: Prexisting or High Potential for Compromised Skin Integrity  Goal: Skin integrity is maintained or improved  Description: INTERVENTIONS:  - Identify patients at risk for skin breakdown  - Assess and monitor skin integrity  - Assess and monitor nutrition and hydration status  - Monitor labs   - Assess for incontinence   - Turn and reposition patient  - Assist with mobility/ambulation  - Relieve pressure over bony prominences  - Avoid friction and shearing  - Provide appropriate hygiene as needed including keeping skin clean and dry  - Evaluate need for skin moisturizer/barrier cream  - Collaborate with interdisciplinary team   - Patient/family teaching  - Consider wound care consult   Outcome: Progressing     Problem: Potential for Falls  Goal: Patient will remain free of falls  Description: INTERVENTIONS:  - Educate patient/family on patient safety including physical limitations  - Instruct patient to call for assistance with activity   - Consult OT/PT to assist with strengthening/mobility   - Keep Call bell within reach  - Keep bed low and locked with side rails adjusted as appropriate  - Keep care items and personal belongings within reach  - Initiate and maintain comfort rounds  - Make Fall Risk Sign visible to staff  - Offer Toileting every 2 Hours, in advance of need  - Initiate/Maintain bed/chair alarm  - Obtain necessary fall risk management equipment: walker  - Apply yellow socks and bracelet for high fall risk patients  - Consider moving patient to room near nurses station  Outcome: Progressing     Problem: MOBILITY - ADULT  Goal: Maintain or return to baseline ADL function  Description: INTERVENTIONS:  -  Assess patient's ability to carry out ADLs; assess patient's baseline for ADL function and identify physical deficits which impact ability to perform ADLs (bathing, care of mouth/teeth, toileting, grooming, dressing, etc )  - Assess/evaluate cause of self-care deficits   - Assess range of motion  - Assess patient's mobility; develop plan if impaired  - Assess patient's need for assistive devices and provide as appropriate  - Encourage maximum independence but intervene and supervise when necessary  - Involve family in performance of ADLs  - Assess for home care needs following discharge   - Consider OT consult to assist with ADL evaluation and planning for discharge  - Provide patient education as appropriate  Outcome: Progressing  Goal: Maintains/Returns to pre admission functional level  Description: INTERVENTIONS:  - Perform BMAT or MOVE assessment daily    - Set and communicate daily mobility goal to care team and patient/family/caregiver  - Collaborate with rehabilitation services on mobility goals if consulted  - Stand patient 6 times a day  - Ambulate patient 4 times a day  - Out of bed to chair 3 times a day   - Out of bed for meals 3 times a day  - Out of bed for toileting  - Record patient progress and toleration of activity level   Outcome: Progressing     Problem: Nutrition/Hydration-ADULT  Goal: Nutrient/Hydration intake appropriate for improving, restoring or maintaining nutritional needs  Description: Monitor and assess patient's nutrition/hydration status for malnutrition  Collaborate with interdisciplinary team and initiate plan and interventions as ordered  Monitor patient's weight and dietary intake as ordered or per policy  Utilize nutrition screening tool and intervene as necessary  Determine patient's food preferences and provide high-protein, high-caloric foods as appropriate       INTERVENTIONS:  - Monitor oral intake, urinary output, labs, and treatment plans  - Assess nutrition and hydration status and recommend course of action  - Evaluate amount of meals eaten  - Assist patient with eating if necessary   - Allow adequate time for meals  - Recommend/ encourage appropriate diets, oral nutritional supplements, and vitamin/mineral supplements  - Order, calculate, and assess calorie counts as needed  - Recommend, monitor, and adjust tube feedings and TPN/PPN based on assessed needs  - Assess need for intravenous fluids  - Provide specific nutrition/hydration education as appropriate  - Include patient/family/caregiver in decisions related to nutrition  Outcome: Progressing

## 2022-09-19 NOTE — PLAN OF CARE
Problem: INFECTION - ADULT  Goal: Absence or prevention of progression during hospitalization  Description: INTERVENTIONS:  - Assess and monitor for signs and symptoms of infection  - Monitor lab/diagnostic results  - Monitor all insertion sites, i e  indwelling lines, tubes, and drains  - Monitor endotracheal if appropriate and nasal secretions for changes in amount and color  - [place] appropriate cooling/warming therapies per order  - Administer medications as ordered  - Instruct and encourage patient and family to use good hand hygiene technique  - Identify and instruct in appropriate isolation precautions for identified infection/condition  Outcome: Progressing  Goal: Absence of fever/infection during neutropenic period  Description: INTERVENTIONS:  - Monitor WBC    Outcome: Progressing     Problem: SAFETY ADULT  Goal: Patient will remain free of falls  Description: INTERVENTIONS:  - Educate patient/family on patient safety including physical limitations  - Instruct patient to call for assistance with activity   - Consult OT/PT to assist with strengthening/mobility   - Keep Call bell within reach  - Keep bed low and locked with side rails adjusted as appropriate  - Keep care items and personal belongings within reach  - Initiate and maintain comfort rounds  - Make Fall Risk Sign visible to staff  - Offer Toileting every 2 Hours, in advance of need  - Initiate/Maintain bed/chair alarm  - Obtain necessary fall risk management equipment: walker  - Apply yellow socks and bracelet for high fall risk patients  - Consider moving patient to room near nurses station  Outcome: Progressing  Goal: Maintain or return to baseline ADL function  Description: INTERVENTIONS:  -  Assess patient's ability to carry out ADLs; assess patient's baseline for ADL function and identify physical deficits which impact ability to perform ADLs (bathing, care of mouth/teeth, toileting, grooming, dressing, etc )  - Assess/evaluate cause of self-care deficits   - Assess range of motion  - Assess patient's mobility; develop plan if impaired  - Assess patient's need for assistive devices and provide as appropriate  - Encourage maximum independence but intervene and supervise when necessary  - Involve family in performance of ADLs  - Assess for home care needs following discharge   - Consider OT consult to assist with ADL evaluation and planning for discharge  - Provide patient education as appropriate  Outcome: Progressing  Goal: Maintains/Returns to pre admission functional level  Description: INTERVENTIONS:  - Perform BMAT or MOVE assessment daily    - Set and communicate daily mobility goal to care team and patient/family/caregiver     - Collaborate with rehabilitation services on mobility goals if consulted  - Stand patient 6 times a day  - Ambulate patient 4 times a day  - Out of bed to chair 3 times a day   - Out of bed for meals 3 times a day  - Out of bed for toileting  - Record patient progress and toleration of activity level   Outcome: Progressing     Problem: DISCHARGE PLANNING  Goal: Discharge to home or other facility with appropriate resources  Description: INTERVENTIONS:  - Identify barriers to discharge w/patient and caregiver  - Arrange for needed discharge resources and transportation as appropriate  - Identify discharge learning needs (meds, wound care, etc )  - Arrange for interpretive services to assist at discharge as needed  - Refer to Case Management Department for coordinating discharge planning if the patient needs post-hospital services based on physician/advanced practitioner order or complex needs related to functional status, cognitive ability, or social support system  Outcome: Progressing     Problem: Knowledge Deficit  Goal: Patient/family/caregiver demonstrates understanding of disease process, treatment plan, medications, and discharge instructions  Description: Complete learning assessment and assess knowledge base   Interventions:  - Provide teaching at level of understanding  - Provide teaching via preferred learning methods  Outcome: Progressing     Problem: Prexisting or High Potential for Compromised Skin Integrity  Goal: Skin integrity is maintained or improved  Description: INTERVENTIONS:  - Identify patients at risk for skin breakdown  - Assess and monitor skin integrity  - Assess and monitor nutrition and hydration status  - Monitor labs   - Assess for incontinence   - Turn and reposition patient  - Assist with mobility/ambulation  - Relieve pressure over bony prominences  - Avoid friction and shearing  - Provide appropriate hygiene as needed including keeping skin clean and dry  - Evaluate need for skin moisturizer/barrier cream  - Collaborate with interdisciplinary team   - Patient/family teaching  - Consider wound care consult   Outcome: Progressing     Problem: Potential for Falls  Goal: Patient will remain free of falls  Description: INTERVENTIONS:  - Educate patient/family on patient safety including physical limitations  - Instruct patient to call for assistance with activity   - Consult OT/PT to assist with strengthening/mobility   - Keep Call bell within reach  - Keep bed low and locked with side rails adjusted as appropriate  - Keep care items and personal belongings within reach  - Initiate and maintain comfort rounds  - Make Fall Risk Sign visible to staff  - Offer Toileting every 2 Hours, in advance of need  - Initiate/Maintain bed/chair alarm  - Obtain necessary fall risk management equipment: walker  - Apply yellow socks and bracelet for high fall risk patients  - Consider moving patient to room near nurses station  Outcome: Progressing     Problem: MOBILITY - ADULT  Goal: Maintain or return to baseline ADL function  Description: INTERVENTIONS:  -  Assess patient's ability to carry out ADLs; assess patient's baseline for ADL function and identify physical deficits which impact ability to perform ADLs (bathing, care of mouth/teeth, toileting, grooming, dressing, etc )  - Assess/evaluate cause of self-care deficits   - Assess range of motion  - Assess patient's mobility; develop plan if impaired  - Assess patient's need for assistive devices and provide as appropriate  - Encourage maximum independence but intervene and supervise when necessary  - Involve family in performance of ADLs  - Assess for home care needs following discharge   - Consider OT consult to assist with ADL evaluation and planning for discharge  - Provide patient education as appropriate  Outcome: Progressing  Goal: Maintains/Returns to pre admission functional level  Description: INTERVENTIONS:  - Perform BMAT or MOVE assessment daily    - Set and communicate daily mobility goal to care team and patient/family/caregiver  - Collaborate with rehabilitation services on mobility goals if consulted  - Stand patient 6 times a day  - Ambulate patient 4 times a day  - Out of bed to chair 3 times a day   - Out of bed for meals 3 times a day  - Out of bed for toileting  - Record patient progress and toleration of activity level   Outcome: Progressing     Problem: Nutrition/Hydration-ADULT  Goal: Nutrient/Hydration intake appropriate for improving, restoring or maintaining nutritional needs  Description: Monitor and assess patient's nutrition/hydration status for malnutrition  Collaborate with interdisciplinary team and initiate plan and interventions as ordered  Monitor patient's weight and dietary intake as ordered or per policy  Utilize nutrition screening tool and intervene as necessary  Determine patient's food preferences and provide high-protein, high-caloric foods as appropriate       INTERVENTIONS:  - Monitor oral intake, urinary output, labs, and treatment plans  - Assess nutrition and hydration status and recommend course of action  - Evaluate amount of meals eaten  - Assist patient with eating if necessary   - Allow adequate time for meals  - Recommend/ encourage appropriate diets, oral nutritional supplements, and vitamin/mineral supplements  - Order, calculate, and assess calorie counts as needed  - Recommend, monitor, and adjust tube feedings and TPN/PPN based on assessed needs  - Assess need for intravenous fluids  - Provide specific nutrition/hydration education as appropriate  - Include patient/family/caregiver in decisions related to nutrition  Outcome: Progressing

## 2022-09-19 NOTE — PLAN OF CARE
Problem: INFECTION - ADULT  Goal: Absence or prevention of progression during hospitalization  Description: INTERVENTIONS:  - Assess and monitor for signs and symptoms of infection  - Monitor lab/diagnostic results  - Monitor all insertion sites, i e  indwelling lines, tubes, and drains  - Monitor endotracheal if appropriate and nasal secretions for changes in amount and color  - [place] appropriate cooling/warming therapies per order  - Administer medications as ordered  - Instruct and encourage patient and family to use good hand hygiene technique  - Identify and instruct in appropriate isolation precautions for identified infection/condition  Outcome: Progressing  Goal: Absence of fever/infection during neutropenic period  Description: INTERVENTIONS:  - Monitor WBC    Outcome: Progressing     Problem: SAFETY ADULT  Goal: Patient will remain free of falls  Description: INTERVENTIONS:  - Educate patient/family on patient safety including physical limitations  - Instruct patient to call for assistance with activity   - Consult OT/PT to assist with strengthening/mobility   - Keep Call bell within reach  - Keep bed low and locked with side rails adjusted as appropriate  - Keep care items and personal belongings within reach  - Initiate and maintain comfort rounds  - Make Fall Risk Sign visible to staff  - Offer Toileting every 2 Hours, in advance of need  - Initiate/Maintain bed/chair alarm  - Obtain necessary fall risk management equipment: walker  - Apply yellow socks and bracelet for high fall risk patients  - Consider moving patient to room near nurses station  Outcome: Progressing  Goal: Maintain or return to baseline ADL function  Description: INTERVENTIONS:  -  Assess patient's ability to carry out ADLs; assess patient's baseline for ADL function and identify physical deficits which impact ability to perform ADLs (bathing, care of mouth/teeth, toileting, grooming, dressing, etc )  - Assess/evaluate cause of self-care deficits   - Assess range of motion  - Assess patient's mobility; develop plan if impaired  - Assess patient's need for assistive devices and provide as appropriate  - Encourage maximum independence but intervene and supervise when necessary  - Involve family in performance of ADLs  - Assess for home care needs following discharge   - Consider OT consult to assist with ADL evaluation and planning for discharge  - Provide patient education as appropriate  Outcome: Progressing  Goal: Maintains/Returns to pre admission functional level  Description: INTERVENTIONS:  - Perform BMAT or MOVE assessment daily    - Set and communicate daily mobility goal to care team and patient/family/caregiver     - Collaborate with rehabilitation services on mobility goals if consulted  - Stand patient 6 times a day  - Ambulate patient 4 times a day  - Out of bed to chair 3 times a day   - Out of bed for meals 3 times a day  - Out of bed for toileting  - Record patient progress and toleration of activity level   Outcome: Progressing     Problem: DISCHARGE PLANNING  Goal: Discharge to home or other facility with appropriate resources  Description: INTERVENTIONS:  - Identify barriers to discharge w/patient and caregiver  - Arrange for needed discharge resources and transportation as appropriate  - Identify discharge learning needs (meds, wound care, etc )  - Arrange for interpretive services to assist at discharge as needed  - Refer to Case Management Department for coordinating discharge planning if the patient needs post-hospital services based on physician/advanced practitioner order or complex needs related to functional status, cognitive ability, or social support system  Outcome: Progressing     Problem: Knowledge Deficit  Goal: Patient/family/caregiver demonstrates understanding of disease process, treatment plan, medications, and discharge instructions  Description: Complete learning assessment and assess knowledge base   Interventions:  - Provide teaching at level of understanding  - Provide teaching via preferred learning methods  Outcome: Progressing     Problem: Prexisting or High Potential for Compromised Skin Integrity  Goal: Skin integrity is maintained or improved  Description: INTERVENTIONS:  - Identify patients at risk for skin breakdown  - Assess and monitor skin integrity  - Assess and monitor nutrition and hydration status  - Monitor labs   - Assess for incontinence   - Turn and reposition patient  - Assist with mobility/ambulation  - Relieve pressure over bony prominences  - Avoid friction and shearing  - Provide appropriate hygiene as needed including keeping skin clean and dry  - Evaluate need for skin moisturizer/barrier cream  - Collaborate with interdisciplinary team   - Patient/family teaching  - Consider wound care consult   Outcome: Progressing     Problem: Potential for Falls  Goal: Patient will remain free of falls  Description: INTERVENTIONS:  - Educate patient/family on patient safety including physical limitations  - Instruct patient to call for assistance with activity   - Consult OT/PT to assist with strengthening/mobility   - Keep Call bell within reach  - Keep bed low and locked with side rails adjusted as appropriate  - Keep care items and personal belongings within reach  - Initiate and maintain comfort rounds  - Make Fall Risk Sign visible to staff  - Offer Toileting every 2 Hours, in advance of need  - Initiate/Maintain bed/chair alarm  - Obtain necessary fall risk management equipment: walker  - Apply yellow socks and bracelet for high fall risk patients  - Consider moving patient to room near nurses station  Outcome: Progressing     Problem: MOBILITY - ADULT  Goal: Maintain or return to baseline ADL function  Description: INTERVENTIONS:  -  Assess patient's ability to carry out ADLs; assess patient's baseline for ADL function and identify physical deficits which impact ability to perform ADLs (bathing, care of mouth/teeth, toileting, grooming, dressing, etc )  - Assess/evaluate cause of self-care deficits   - Assess range of motion  - Assess patient's mobility; develop plan if impaired  - Assess patient's need for assistive devices and provide as appropriate  - Encourage maximum independence but intervene and supervise when necessary  - Involve family in performance of ADLs  - Assess for home care needs following discharge   - Consider OT consult to assist with ADL evaluation and planning for discharge  - Provide patient education as appropriate  Outcome: Progressing  Goal: Maintains/Returns to pre admission functional level  Description: INTERVENTIONS:  - Perform BMAT or MOVE assessment daily    - Set and communicate daily mobility goal to care team and patient/family/caregiver  - Collaborate with rehabilitation services on mobility goals if consulted  - Stand patient 6 times a day  - Ambulate patient 4 times a day  - Out of bed to chair 3 times a day   - Out of bed for meals 3 times a day  - Out of bed for toileting  - Record patient progress and toleration of activity level   Outcome: Progressing     Problem: Nutrition/Hydration-ADULT  Goal: Nutrient/Hydration intake appropriate for improving, restoring or maintaining nutritional needs  Description: Monitor and assess patient's nutrition/hydration status for malnutrition  Collaborate with interdisciplinary team and initiate plan and interventions as ordered  Monitor patient's weight and dietary intake as ordered or per policy  Utilize nutrition screening tool and intervene as necessary  Determine patient's food preferences and provide high-protein, high-caloric foods as appropriate       INTERVENTIONS:  - Monitor oral intake, urinary output, labs, and treatment plans  - Assess nutrition and hydration status and recommend course of action  - Evaluate amount of meals eaten  - Assist patient with eating if necessary   - Allow adequate time for meals  - Recommend/ encourage appropriate diets, oral nutritional supplements, and vitamin/mineral supplements  - Order, calculate, and assess calorie counts as needed  - Recommend, monitor, and adjust tube feedings and TPN/PPN based on assessed needs  - Assess need for intravenous fluids  - Provide specific nutrition/hydration education as appropriate  - Include patient/family/caregiver in decisions related to nutrition  Outcome: Progressing

## 2022-09-20 PROCEDURE — 99232 SBSQ HOSP IP/OBS MODERATE 35: CPT | Performed by: PHYSICIAN ASSISTANT

## 2022-09-20 RX ADMIN — DONEPEZIL HYDROCHLORIDE 10 MG: 5 TABLET ORAL at 21:58

## 2022-09-20 RX ADMIN — CITALOPRAM HYDROBROMIDE 20 MG: 20 TABLET ORAL at 12:06

## 2022-09-20 RX ADMIN — PREDNISONE 10 MG: 10 TABLET ORAL at 12:07

## 2022-09-20 RX ADMIN — ENOXAPARIN SODIUM 40 MG: 40 INJECTION SUBCUTANEOUS at 12:07

## 2022-09-20 RX ADMIN — QUETIAPINE FUMARATE 100 MG: 100 TABLET ORAL at 21:58

## 2022-09-20 RX ADMIN — GABAPENTIN 100 MG: 100 CAPSULE ORAL at 17:51

## 2022-09-20 RX ADMIN — CLOPIDOGREL BISULFATE 75 MG: 75 TABLET ORAL at 12:06

## 2022-09-20 RX ADMIN — GABAPENTIN 100 MG: 100 CAPSULE ORAL at 12:06

## 2022-09-20 RX ADMIN — OLANZAPINE 5 MG: 5 TABLET, ORALLY DISINTEGRATING ORAL at 17:51

## 2022-09-20 NOTE — ASSESSMENT & PLAN NOTE
· Sent from [facility] to [facility] ER for worsening dementia with agitated features  · Continue Seroquel and Zyprexa as needed  · Intermittently in and out of restraints during his stay  Attempt to wean off this AM   · PT/OT recommending post acute rehab  · CM following - awaiting optioning process  · Continue aricept  · Continue redirection  · Has been mainly calm and cooperative last several days, overall improved behaviors since admit

## 2022-09-20 NOTE — PLAN OF CARE
Problem: INFECTION - ADULT  Goal: Absence or prevention of progression during hospitalization  Description: INTERVENTIONS:  - Assess and monitor for signs and symptoms of infection  - Monitor lab/diagnostic results  - Monitor all insertion sites, i e  indwelling lines, tubes, and drains  - Monitor endotracheal if appropriate and nasal secretions for changes in amount and color  - [place] appropriate cooling/warming therapies per order  - Administer medications as ordered  - Instruct and encourage patient and family to use good hand hygiene technique  - Identify and instruct in appropriate isolation precautions for identified infection/condition  9/20/2022 1413 by Vanessa Umanzor RN  Outcome: Progressing  9/20/2022 1413 by Vanessa Umanzor RN  Outcome: Progressing  Goal: Absence of fever/infection during neutropenic period  Description: INTERVENTIONS:  - Monitor WBC    9/20/2022 1413 by Vanessa Umanzor RN  Outcome: Progressing  9/20/2022 1413 by Vanessa Umanzor RN  Outcome: Progressing     Problem: SAFETY ADULT  Goal: Patient will remain free of falls  Description: INTERVENTIONS:  - Educate patient/family on patient safety including physical limitations  - Instruct patient to call for assistance with activity   - Consult OT/PT to assist with strengthening/mobility   - Keep Call bell within reach  - Keep bed low and locked with side rails adjusted as appropriate  - Keep care items and personal belongings within reach  - Initiate and maintain comfort rounds  - Make Fall Risk Sign visible to staff  - Offer Toileting every 2 Hours, in advance of need  - Initiate/Maintain bed/chair alarm  - Obtain necessary fall risk management equipment: walker  - Apply yellow socks and bracelet for high fall risk patients  - Consider moving patient to room near nurses station  9/20/2022 1413 by Vanessa Umanzor RN  Outcome: Progressing  9/20/2022 1413 by Vanessa Umanzor RN  Outcome: Progressing  Goal: Maintain or return to baseline ADL function  Description: INTERVENTIONS:  -  Assess patient's ability to carry out ADLs; assess patient's baseline for ADL function and identify physical deficits which impact ability to perform ADLs (bathing, care of mouth/teeth, toileting, grooming, dressing, etc )  - Assess/evaluate cause of self-care deficits   - Assess range of motion  - Assess patient's mobility; develop plan if impaired  - Assess patient's need for assistive devices and provide as appropriate  - Encourage maximum independence but intervene and supervise when necessary  - Involve family in performance of ADLs  - Assess for home care needs following discharge   - Consider OT consult to assist with ADL evaluation and planning for discharge  - Provide patient education as appropriate  9/20/2022 1413 by Greg Doyle RN  Outcome: Progressing  9/20/2022 1413 by Greg Doyle RN  Outcome: Progressing  Goal: Maintains/Returns to pre admission functional level  Description: INTERVENTIONS:  - Perform BMAT or MOVE assessment daily    - Set and communicate daily mobility goal to care team and patient/family/caregiver     - Collaborate with rehabilitation services on mobility goals if consulted  - Stand patient 6 times a day  - Ambulate patient 4 times a day  - Out of bed to chair 3 times a day   - Out of bed for meals 3 times a day  - Out of bed for toileting  - Record patient progress and toleration of activity level   9/20/2022 1413 by Greg Doyle RN  Outcome: Progressing  9/20/2022 1413 by Greg Doyle RN  Outcome: Progressing     Problem: DISCHARGE PLANNING  Goal: Discharge to home or other facility with appropriate resources  Description: INTERVENTIONS:  - Identify barriers to discharge w/patient and caregiver  - Arrange for needed discharge resources and transportation as appropriate  - Identify discharge learning needs (meds, wound care, etc )  - Arrange for interpretive services to assist at discharge as needed  - Refer to Case Management Department for coordinating discharge planning if the patient needs post-hospital services based on physician/advanced practitioner order or complex needs related to functional status, cognitive ability, or social support system  9/20/2022 1413 by Estefanía Charles RN  Outcome: Progressing  9/20/2022 1413 by Estefanía Charles RN  Outcome: Progressing     Problem: Knowledge Deficit  Goal: Patient/family/caregiver demonstrates understanding of disease process, treatment plan, medications, and discharge instructions  Description: Complete learning assessment and assess knowledge base    Interventions:  - Provide teaching at level of understanding  - Provide teaching via preferred learning methods  9/20/2022 1413 by Estefanía Charles RN  Outcome: Progressing  9/20/2022 1413 by Estefanía Charles RN  Outcome: Progressing     Problem: Prexisting or High Potential for Compromised Skin Integrity  Goal: Skin integrity is maintained or improved  Description: INTERVENTIONS:  - Identify patients at risk for skin breakdown  - Assess and monitor skin integrity  - Assess and monitor nutrition and hydration status  - Monitor labs   - Assess for incontinence   - Turn and reposition patient  - Assist with mobility/ambulation  - Relieve pressure over bony prominences  - Avoid friction and shearing  - Provide appropriate hygiene as needed including keeping skin clean and dry  - Evaluate need for skin moisturizer/barrier cream  - Collaborate with interdisciplinary team   - Patient/family teaching  - Consider wound care consult   9/20/2022 1413 by Estefanía Charles RN  Outcome: Progressing  9/20/2022 1413 by Estefanía Charles RN  Outcome: Progressing     Problem: Potential for Falls  Goal: Patient will remain free of falls  Description: INTERVENTIONS:  - Educate patient/family on patient safety including physical limitations  - Instruct patient to call for assistance with activity   - Consult OT/PT to assist with strengthening/mobility   - Keep Call bell within reach  - Keep bed low and locked with side rails adjusted as appropriate  - Keep care items and personal belongings within reach  - Initiate and maintain comfort rounds  - Make Fall Risk Sign visible to staff  - Offer Toileting every 2 Hours, in advance of need  - Initiate/Maintain bed/chair alarm  - Obtain necessary fall risk management equipment: walker  - Apply yellow socks and bracelet for high fall risk patients  - Consider moving patient to room near nurses station  9/20/2022 1413 by Jessy Nair RN  Outcome: Progressing  9/20/2022 1413 by Jessy Nair RN  Outcome: Progressing     Problem: MOBILITY - ADULT  Goal: Maintain or return to baseline ADL function  Description: INTERVENTIONS:  -  Assess patient's ability to carry out ADLs; assess patient's baseline for ADL function and identify physical deficits which impact ability to perform ADLs (bathing, care of mouth/teeth, toileting, grooming, dressing, etc )  - Assess/evaluate cause of self-care deficits   - Assess range of motion  - Assess patient's mobility; develop plan if impaired  - Assess patient's need for assistive devices and provide as appropriate  - Encourage maximum independence but intervene and supervise when necessary  - Involve family in performance of ADLs  - Assess for home care needs following discharge   - Consider OT consult to assist with ADL evaluation and planning for discharge  - Provide patient education as appropriate  9/20/2022 1413 by Jessy Nair RN  Outcome: Progressing  9/20/2022 1413 by Jessy Nair RN  Outcome: Progressing  Goal: Maintains/Returns to pre admission functional level  Description: INTERVENTIONS:  - Perform BMAT or MOVE assessment daily    - Set and communicate daily mobility goal to care team and patient/family/caregiver     - Collaborate with rehabilitation services on mobility goals if consulted  - Stand patient 6 times a day  - Ambulate patient 4 times a day  - Out of bed to chair 3 times a day - Out of bed for meals 3 times a day  - Out of bed for toileting  - Record patient progress and toleration of activity level   9/20/2022 1413 by Bre Delgado RN  Outcome: Progressing  9/20/2022 1413 by Bre Delgado RN  Outcome: Progressing     Problem: Nutrition/Hydration-ADULT  Goal: Nutrient/Hydration intake appropriate for improving, restoring or maintaining nutritional needs  Description: Monitor and assess patient's nutrition/hydration status for malnutrition  Collaborate with interdisciplinary team and initiate plan and interventions as ordered  Monitor patient's weight and dietary intake as ordered or per policy  Utilize nutrition screening tool and intervene as necessary  Determine patient's food preferences and provide high-protein, high-caloric foods as appropriate       INTERVENTIONS:  - Monitor oral intake, urinary output, labs, and treatment plans  - Assess nutrition and hydration status and recommend course of action  - Evaluate amount of meals eaten  - Assist patient with eating if necessary   - Allow adequate time for meals  - Recommend/ encourage appropriate diets, oral nutritional supplements, and vitamin/mineral supplements  - Order, calculate, and assess calorie counts as needed  - Recommend, monitor, and adjust tube feedings and TPN/PPN based on assessed needs  - Assess need for intravenous fluids  - Provide specific nutrition/hydration education as appropriate  - Include patient/family/caregiver in decisions related to nutrition  9/20/2022 1413 by Bre Delgado RN  Outcome: Progressing  9/20/2022 1413 by Bre Delgado RN  Outcome: Progressing     Problem: SAFETY,RESTRAINT: NV/NON-SELF DESTRUCTIVE BEHAVIOR  Goal: Remains free of harm/injury (restraint for non violent/non self-detsructive behavior)  Description: INTERVENTIONS:  - Instruct patient/family regarding restraint use   - Assess and monitor physiologic and psychological status   - Provide interventions and comfort measures to meet assessed patient needs   - Identify and implement measures to help patient regain control  - Assess readiness for release of restraint   9/20/2022 1413 by Héctor Alvarado RN  Outcome: Completed  9/20/2022 1413 by Héctor Alvarado RN  Outcome: Progressing  Goal: Returns to optimal restraint-free functioning  Description: INTERVENTIONS:  - Assess the patient's behavior and symptoms that indicate continued need for restraint  - Identify and implement measures to help patient regain control  - Assess readiness for release of restraint   9/20/2022 1413 by Héctor Alvarado RN  Outcome: Completed  9/20/2022 1413 by Héctor Alvarado RN  Outcome: Progressing

## 2022-09-20 NOTE — NURSING NOTE
Patient repeatedly getting up out of bed  [place] ineffective  Upgraded restraints to bilateral wrist restraints & patient started to kick staff & became verbally abusive  Control team called  Security & staff came in to assist applying wrist restraints  Restraints on, alarm on

## 2022-09-20 NOTE — ASSESSMENT & PLAN NOTE
· Family unable to provide appropriate assistance in independent living facility  · Still awaiting option paperwork

## 2022-09-20 NOTE — PROGRESS NOTES
[facility]  Progress Note - María Mathias 9/7/1931, 80 y o  male MRN: [id number]  Unit/Bed#: W -01 Encounter: [id number]  Primary Care Provider: No primary care provider on file  Date and time admitted to hospital: 9/8/2022  3:17 PM    * Late onset Alzheimer's disease with behavioral disturbance ([facility] [address] )  Assessment & Plan  · [facility] ER for worsening dementia with agitated features  · Continue Seroquel and Zyprexa as needed  · Intermittently in and out of restraints during his stay  Attempt to wean off this AM   · PT/OT recommending post acute rehab  · CM following - awaiting optioning process  · Continue aricept  · Continue redirection  · Has been mainly calm and cooperative last several days, overall improved behaviors since admit  Inability to return to living situation  Assessment & Plan  · Family unable to provide appropriate assistance in independent living facility  · Still awaiting option paperwork  Pressure injury of sacral region, stage 2 (HCC)  Assessment & Plan  · POA  · Offloading and local wound care    Primary hypertension  Assessment & Plan  · Had been on Lopressor 50 mg daily prior to admission  · Decreased to 25 mg then discontinued given persistent bradycardia  · Continue amlodipine 5 mg daily  · -130    Rheumatoid arthritis with positive rheumatoid factor (HCC)  Assessment & Plan  · On chronic steroids with 10 mg daily prednisone  · Follow-up with rheumatology outpatient        VTE Pharmacologic Prophylaxis: VTE Score: 4 Moderate Risk (Score 3-4) - Pharmacological DVT Prophylaxis Ordered: heparin  Patient Centered Rounds: I performed bedside rounds with nursing staff today  Discussions with Specialists or Other Care Team Provider: jean, rn    Education and Discussions with Family / Patient: Updated  (daughter) via phone  Time Spent for Care: 30 minutes   More than 50% of total time spent on counseling and coordination of care as described above  Current Length of Stay: 11 day(s)  Current Patient Status: Inpatient   Certification Statement: The patient will continue to require additional inpatient hospital stay due to awaitign rehab placement  Discharge Plan: Anticipate discharge in 24-48 hrs to rehab facility  Code Status: Level 1 - Full Code    Subjective: Intermittently in and out of restraints  Restrain this morning upon arrival   Will attempt to wean today  Overall waiting option paperwork from case management    Objective:     Vitals:   Temp (24hrs), Av 8 °F (36 6 °C), Min:97 5 °F (36 4 °C), Max:98 2 °F (36 8 °C)    Temp:  [97 5 °F (36 4 °C)-98 2 °F (36 8 °C)] 98 2 °F (36 8 °C)  HR:  [59-75] 75  Resp:  [16-18] 18  BP: ()/() 138/60  SpO2:  [88 %-98 %] 98 %  There is no height or weight on file to calculate BMI  Input and Output Summary (last 24 hours): Intake/Output Summary (Last 24 hours) at 2022 0817  Last data filed at 2022 1728  Gross per 24 hour   Intake 540 ml   Output --   Net 540 ml       Physical Exam:   Physical Exam  Vitals and nursing note reviewed  Constitutional:       General: He is not in acute distress  Appearance: Normal appearance  He is not ill-appearing or diaphoretic  HENT:      Head: Normocephalic and atraumatic  Cardiovascular:      Rate and Rhythm: Normal rate and regular rhythm  Pulmonary:      Effort: Pulmonary effort is normal       Breath sounds: Normal breath sounds  No stridor  No wheezing, rhonchi or rales  Abdominal:      General: Bowel sounds are normal       Palpations: Abdomen is soft  There is no mass  Tenderness: There is no abdominal tenderness  There is no guarding  Musculoskeletal:      Right lower leg: No edema  Left lower leg: No edema  Skin:     General: Skin is warm and dry  Neurological:      Mental Status: He is alert        Comments: Awake, alert, oriented to person and the fact that he is in the hospital  Does not know year or why he is in hospital    Psychiatric:         Mood and Affect: Mood normal          Behavior: Behavior normal          Additional Data:     Labs:      Results from last 7 days   Lab Units 09/18/22  0536   SODIUM mmol/L 141   POTASSIUM mmol/L 4 7   CHLORIDE mmol/L 109*   CO2 mmol/L 28   BUN mg/dL 36*   CREATININE mg/dL 1 54*   ANION GAP mmol/L 4   CALCIUM mg/dL 8 0*   GLUCOSE RANDOM mg/dL 89                       Lines/Drains:  Invasive Devices  Report    None                       Imaging: No pertinent imaging reviewed  Recent Cultures (last 7 days):         Last 24 Hours Medication List:   Current Facility-Administered Medications   Medication Dose Route Frequency Provider Last Rate    acetaminophen  650 mg Oral Q6H PRN Vianca Carreon MD      aluminum-magnesium hydroxide-simethicone  30 mL Oral Q6H PRN Vianca Carreon MD      amLODIPine  5 mg Oral Daily Elsy Pellet, LAURIE      citalopram  20 mg Oral Daily Vianca Carreon MD      clopidogrel  75 mg Oral Daily Vianca Carreon MD      donepezil  10 mg Oral HS Vianca Carreon MD      enoxaparin  40 mg Subcutaneous Daily Vianca Carreon MD      gabapentin  100 mg Oral BID Vianca Carreon MD      OLANZapine  5 mg Oral Q3H PRN Elsy Barajas, LAURIE      ondansetron  4 mg Intravenous Q6H PRN Vianca Carreon MD      polyethylene glycol  17 g Oral Daily PRN Vianca Carreon MD      predniSONE  10 mg Oral Daily Vianca Carreon MD      QUEtiapine  100 mg Oral HS Vianca Carreon MD          Today, Patient Was Seen By: Americo Mccormack PA-C    **Please Note: This note may have been constructed using a voice recognition system  **

## 2022-09-20 NOTE — CASE MANAGEMENT
Case Management Progress Note    Patient name Maranda Jason  Location W [address]W [address]95 MRN [id number]  : 1931 Date 2022       LOS (days): 11  Geometric Mean LOS (GMLOS) (days): 4 50  Days to GMLOS:-6 7        OBJECTIVE:        Current admission status: Inpatient  Preferred Pharmacy:   [place] [address] [facility], [address] [place] [address]  Phone: [phone] Fax: [phone]    [id number], [address]  [place]  [address]  [facility] [address]  Phone: [phone] Fax: [phone]    Primary Care Provider: No primary care provider on file  Primary Insurance: [facility]  Secondary Insurance:     PROGRESS NOTE:  CM completed FED assessment with the [facility]  After completion, pt is deemed NFCE and paperwork was emailed to CM  CM updated Noe Tejeda from the [address] [place] [address]  He would like to do an on-site visit to see the pt since pt is on/off restraints  Pt's daughter Dina Dent updated

## 2022-09-21 VITALS
OXYGEN SATURATION: 96 % | HEART RATE: 56 BPM | RESPIRATION RATE: 19 BRPM | DIASTOLIC BLOOD PRESSURE: 60 MMHG | SYSTOLIC BLOOD PRESSURE: 127 MMHG | TEMPERATURE: 97.7 F

## 2022-09-21 PROCEDURE — 99239 HOSP IP/OBS DSCHRG MGMT >30: CPT | Performed by: PHYSICIAN ASSISTANT

## 2022-09-21 RX ORDER — AMLODIPINE BESYLATE 5 MG/1
5 TABLET ORAL DAILY
Qty: 30 TABLET | Refills: 0 | Status: SHIPPED
Start: 2022-09-22

## 2022-09-21 RX ORDER — OLANZAPINE 5 MG/1
5 TABLET, ORALLY DISINTEGRATING ORAL
Qty: 10 TABLET | Refills: 0 | Status: SHIPPED
Start: 2022-09-21

## 2022-09-21 RX ORDER — QUETIAPINE FUMARATE 100 MG/1
100 TABLET, FILM COATED ORAL
Qty: 30 TABLET | Refills: 0 | Status: SHIPPED
Start: 2022-09-21

## 2022-09-21 RX ADMIN — CLOPIDOGREL BISULFATE 75 MG: 75 TABLET ORAL at 09:44

## 2022-09-21 RX ADMIN — ENOXAPARIN SODIUM 40 MG: 40 INJECTION SUBCUTANEOUS at 09:45

## 2022-09-21 RX ADMIN — GABAPENTIN 100 MG: 100 CAPSULE ORAL at 09:44

## 2022-09-21 RX ADMIN — CITALOPRAM HYDROBROMIDE 20 MG: 20 TABLET ORAL at 09:44

## 2022-09-21 RX ADMIN — PREDNISONE 10 MG: 10 TABLET ORAL at 09:44

## 2022-09-21 RX ADMIN — AMLODIPINE BESYLATE 5 MG: 5 TABLET ORAL at 09:44

## 2022-09-21 NOTE — PLAN OF CARE
Problem: INFECTION - ADULT  Goal: Absence or prevention of progression during hospitalization  Description: INTERVENTIONS:  - Assess and monitor for signs and symptoms of infection  - Monitor lab/diagnostic results  - Monitor all insertion sites, i e  indwelling lines, tubes, and drains  - Monitor endotracheal if appropriate and nasal secretions for changes in amount and color  - [place] appropriate cooling/warming therapies per order  - Administer medications as ordered  - Instruct and encourage patient and family to use good hand hygiene technique  - Identify and instruct in appropriate isolation precautions for identified infection/condition  Outcome: Progressing  Goal: Absence of fever/infection during neutropenic period  Description: INTERVENTIONS:  - Monitor WBC    Outcome: Progressing     Problem: SAFETY ADULT  Goal: Patient will remain free of falls  Description: INTERVENTIONS:  - Educate patient/family on patient safety including physical limitations  - Instruct patient to call for assistance with activity   - Consult OT/PT to assist with strengthening/mobility   - Keep Call bell within reach  - Keep bed low and locked with side rails adjusted as appropriate  - Keep care items and personal belongings within reach  - Initiate and maintain comfort rounds  - Make Fall Risk Sign visible to staff  - Offer Toileting every 2 Hours, in advance of need  - Initiate/Maintain bed/chair alarm  - Obtain necessary fall risk management equipment: walker  - Apply yellow socks and bracelet for high fall risk patients  - Consider moving patient to room near nurses station  Outcome: Progressing  Goal: Maintain or return to baseline ADL function  Description: INTERVENTIONS:  -  Assess patient's ability to carry out ADLs; assess patient's baseline for ADL function and identify physical deficits which impact ability to perform ADLs (bathing, care of mouth/teeth, toileting, grooming, dressing, etc )  - Assess/evaluate cause of self-care deficits   - Assess range of motion  - Assess patient's mobility; develop plan if impaired  - Assess patient's need for assistive devices and provide as appropriate  - Encourage maximum independence but intervene and supervise when necessary  - Involve family in performance of ADLs  - Assess for home care needs following discharge   - Consider OT consult to assist with ADL evaluation and planning for discharge  - Provide patient education as appropriate  Outcome: Progressing  Goal: Maintains/Returns to pre admission functional level  Description: INTERVENTIONS:  - Perform BMAT or MOVE assessment daily    - Set and communicate daily mobility goal to care team and patient/family/caregiver     - Collaborate with rehabilitation services on mobility goals if consulted  - Stand patient 6 times a day  - Ambulate patient 4 times a day  - Out of bed to chair 3 times a day   - Out of bed for meals 3 times a day  - Out of bed for toileting  - Record patient progress and toleration of activity level   Outcome: Progressing     Problem: DISCHARGE PLANNING  Goal: Discharge to home or other facility with appropriate resources  Description: INTERVENTIONS:  - Identify barriers to discharge w/patient and caregiver  - Arrange for needed discharge resources and transportation as appropriate  - Identify discharge learning needs (meds, wound care, etc )  - Arrange for interpretive services to assist at discharge as needed  - Refer to Case Management Department for coordinating discharge planning if the patient needs post-hospital services based on physician/advanced practitioner order or complex needs related to functional status, cognitive ability, or social support system  Outcome: Progressing     Problem: Knowledge Deficit  Goal: Patient/family/caregiver demonstrates understanding of disease process, treatment plan, medications, and discharge instructions  Description: Complete learning assessment and assess knowledge base   Interventions:  - Provide teaching at level of understanding  - Provide teaching via preferred learning methods  Outcome: Progressing     Problem: Prexisting or High Potential for Compromised Skin Integrity  Goal: Skin integrity is maintained or improved  Description: INTERVENTIONS:  - Identify patients at risk for skin breakdown  - Assess and monitor skin integrity  - Assess and monitor nutrition and hydration status  - Monitor labs   - Assess for incontinence   - Turn and reposition patient  - Assist with mobility/ambulation  - Relieve pressure over bony prominences  - Avoid friction and shearing  - Provide appropriate hygiene as needed including keeping skin clean and dry  - Evaluate need for skin moisturizer/barrier cream  - Collaborate with interdisciplinary team   - Patient/family teaching  - Consider wound care consult   Outcome: Progressing     Problem: Potential for Falls  Goal: Patient will remain free of falls  Description: INTERVENTIONS:  - Educate patient/family on patient safety including physical limitations  - Instruct patient to call for assistance with activity   - Consult OT/PT to assist with strengthening/mobility   - Keep Call bell within reach  - Keep bed low and locked with side rails adjusted as appropriate  - Keep care items and personal belongings within reach  - Initiate and maintain comfort rounds  - Make Fall Risk Sign visible to staff  - Offer Toileting every 2 Hours, in advance of need  - Initiate/Maintain bed/chair alarm  - Obtain necessary fall risk management equipment: walker  - Apply yellow socks and bracelet for high fall risk patients  - Consider moving patient to room near nurses station  Outcome: Progressing     Problem: MOBILITY - ADULT  Goal: Maintain or return to baseline ADL function  Description: INTERVENTIONS:  -  Assess patient's ability to carry out ADLs; assess patient's baseline for ADL function and identify physical deficits which impact ability to perform ADLs (bathing, care of mouth/teeth, toileting, grooming, dressing, etc )  - Assess/evaluate cause of self-care deficits   - Assess range of motion  - Assess patient's mobility; develop plan if impaired  - Assess patient's need for assistive devices and provide as appropriate  - Encourage maximum independence but intervene and supervise when necessary  - Involve family in performance of ADLs  - Assess for home care needs following discharge   - Consider OT consult to assist with ADL evaluation and planning for discharge  - Provide patient education as appropriate  Outcome: Progressing  Goal: Maintains/Returns to pre admission functional level  Description: INTERVENTIONS:  - Perform BMAT or MOVE assessment daily    - Set and communicate daily mobility goal to care team and patient/family/caregiver  - Collaborate with rehabilitation services on mobility goals if consulted  - Stand patient 6 times a day  - Ambulate patient 4 times a day  - Out of bed to chair 3 times a day   - Out of bed for meals 3 times a day  - Out of bed for toileting  - Record patient progress and toleration of activity level   Outcome: Progressing     Problem: Nutrition/Hydration-ADULT  Goal: Nutrient/Hydration intake appropriate for improving, restoring or maintaining nutritional needs  Description: Monitor and assess patient's nutrition/hydration status for malnutrition  Collaborate with interdisciplinary team and initiate plan and interventions as ordered  Monitor patient's weight and dietary intake as ordered or per policy  Utilize nutrition screening tool and intervene as necessary  Determine patient's food preferences and provide high-protein, high-caloric foods as appropriate       INTERVENTIONS:  - Monitor oral intake, urinary output, labs, and treatment plans  - Assess nutrition and hydration status and recommend course of action  - Evaluate amount of meals eaten  - Assist patient with eating if necessary   - Allow adequate time for meals  - Recommend/ encourage appropriate diets, oral nutritional supplements, and vitamin/mineral supplements  - Order, calculate, and assess calorie counts as needed  - Recommend, monitor, and adjust tube feedings and TPN/PPN based on assessed needs  - Assess need for intravenous fluids  - Provide specific nutrition/hydration education as appropriate  - Include patient/family/caregiver in decisions related to nutrition  Outcome: Progressing

## 2022-09-21 NOTE — ASSESSMENT & PLAN NOTE
· BP acceptable   · Had been on Lopressor 50 mg daily prior to admission, but was stopped due to bradycardia   · Continue amlodipine 5 mg daily

## 2022-09-21 NOTE — DISCHARGE SUMMARY
[facility]  Discharge- María Mathias 9/7/1931, 80 y o  male MRN: [id number]  Unit/Bed#: W -01 Encounter: [id number]  Primary Care Provider: No primary care provider on file  Date and time admitted to hospital: 9/8/2022  3:17 PM    * Late onset Alzheimer's disease with behavioral disturbance ([facility] [address] )  Assessment & Plan  · [facility] for worsening dementia with agitated features  · Stable for discharge to [address]   · Continue Seroquel and Zyprexa as needed  · Successfully off restraints   · PT/OT recommending post acute rehab  · CM following  · Continue aricept      Primary hypertension  Assessment & Plan  · BP acceptable   · Had been on Lopressor 50 mg daily prior to admission, but was stopped due to bradycardia   · Continue amlodipine 5 mg daily      Pressure injury of sacral region, stage 2 (HCC)  Assessment & Plan  · POA  · Offloading and local wound care    Inability to return to living situation  Assessment & Plan  · Family unable to provide appropriate assistance in independent living facility  · Accepted at [address]     Rheumatoid arthritis with positive rheumatoid factor ([facility] [address] )  Assessment & Plan  · On chronic steroids with 10 mg daily prednisone  · Follow-up with rheumatology outpatient      Medical Problems             Resolved Problems  Date Reviewed: 9/21/2022   None               Discharging Physician / Practitioner: Matty Monzon PA-C  PCP: No primary care provider on file  Admission Date:   Admission Orders (From admission, onward)     Ordered        09/09/22 1053  Inpatient Admission  Once            09/08/22 1852  Place in Observation  Once                      Discharge Date: 09/21/22    Consultations During Hospital Stay:  · None      Procedures Performed:   · None    Significant Findings / Test Results:   · None    Incidental Findings:   · None     Test Results Pending at Discharge (will require follow up):    · None Outpatient Tests Requested:  · None    Complications:  None    Reason for Admission: Worsening Dementia/Agitation    Hospital Course:   Sergio Lauren is a 80 y o  male patient who originally presented to the hospital on 9/8/2022 due to dementia and agitation  He was unable to return to [facility] where he was presently living  He was admitted and placed on 1:1  This was weaned off as possible as Zyprexa PRN was used for agitation  This was successful and will be continued at discharge  His Seroquel was also increased from 50 to 100 mg at bedtime  PT and OT recommended rehab and the optioning process was started with case management  He was accepted at [facility] and discharged there  It was noted that the patient was bradycardic on home Metoprolol dose that he used for BP  This was discontinued and substituted with Norvasc with good control  Please see above list of diagnoses and related plan for additional information  Condition at Discharge: stable    Discharge Day Visit / Exam:   Subjective:  Patient has no complaints day of discharge  No events per nursing  Vitals: Blood Pressure: 127/60 (09/21/22 0810)  Pulse: 56 (09/21/22 0810)  Temperature: 97 7 °F (36 5 °C) (09/21/22 0810)  Temp Source: Oral (09/19/22 2153)  Respirations: 19 (09/21/22 0810)  SpO2: 96 % (09/21/22 0810)  Exam:   Physical Exam  Constitutional:       General: He is sleeping  He is not in acute distress  Appearance: Normal appearance  He is normal weight  He is not ill-appearing or diaphoretic  HENT:      Head: Normocephalic and atraumatic  Mouth/Throat:      Mouth: Mucous membranes are moist    Eyes:      General: No scleral icterus  Pupils: Pupils are equal, round, and reactive to light  Cardiovascular:      Rate and Rhythm: Normal rate and regular rhythm  Pulses: Normal pulses  Heart sounds: Normal heart sounds, S1 normal and S2 normal  No murmur heard  No systolic murmur is present  No diastolic murmur is present  No gallop  No S3 or S4 sounds  Pulmonary:      Effort: Pulmonary effort is normal  No accessory muscle usage or respiratory distress  Breath sounds: Normal breath sounds  No stridor  No decreased breath sounds, wheezing, rhonchi or rales  Chest:      Chest wall: No tenderness  Abdominal:      General: Bowel sounds are normal  There is no distension  Palpations: Abdomen is soft  Tenderness: There is no abdominal tenderness  There is no guarding  Musculoskeletal:      Right lower leg: No edema  Left lower leg: No edema  Skin:     General: Skin is warm and dry  Coloration: Skin is not jaundiced  Neurological:      General: No focal deficit present  Mental Status: Mental status is at baseline  Motor: No tremor or seizure activity  Psychiatric:         Behavior: Behavior is cooperative  Discussion with Family: Case management updated family over phone     Discharge instructions/Information to patient and family:   See after visit summary for information provided to patient and family  Provisions for Follow-Up Care:  See after visit summary for information related to follow-up care and any pertinent home health orders  Disposition:   [facility]     Planned Readmission: None     Discharge Statement:  I spent 45 minutes discharging the patient  This time was spent on the day of discharge  I had direct contact with the patient on the day of discharge  Greater than 50% of the total time was spent examining patient, answering all patient questions, arranging and discussing plan of care with patient as well as directly providing post-discharge instructions  Additional time then spent on discharge activities  Discharge Medications:  See after visit summary for reconciled discharge medications provided to patient and/or family        **Please Note: This note may have been constructed using a voice recognition system**

## 2022-09-21 NOTE — ASSESSMENT & PLAN NOTE
· Sent from [facility] to [facility] ER for worsening dementia with agitated features  · Stable for discharge to [address]   · Continue Seroquel and Zyprexa as needed    · Successfully off restraints   · PT/OT recommending post acute rehab  · CM following  · Continue aricept

## 2022-09-21 NOTE — CASE MANAGEMENT
Case Management Discharge Planning Note    Patient name Tyler Ley  Location W /W [address] 607-25 MRN [id number]  : 1931 Date 2022       Current Admission Date: 2022  Current Admission Diagnosis:Late onset Alzheimer's disease with behavioral disturbance [facility])   Patient Active Problem List    Diagnosis Date Noted    Inability to return to living situation 09/15/2022    Pressure injury of sacral region, stage 2 ([facility] [address] ) 2022    Buttock wound 2022    Positive blood cultures 2022    UTI (urinary tract infection) 2022    Primary hypertension 10/24/2021    [facility]per[facility] 10/24/2021    Late onset Alzheimer's disease with behavioral disturbance ([place] [address] ) 2021    Benign prostatic hyperplasia with post-void dribbling 2021    Rheumatoid arthritis with positive rheumatoid factor ([place] [address] ) 2021    Agitation due to dementia ([place] [address] ) 2021      LOS (days): 12  Geometric Mean LOS (GMLOS) (days): 4 50  Days to GMLOS:-7 6     OBJECTIVE:  Risk of Unplanned Readmission Score: 21 97         Current admission status: Inpatient   Preferred Pharmacy:   [address], [address] [place] [address]  Phone: [phone] Fax: [phone]    [id number]- [facility] [address] [facility]  [address] [place] [address]  Phone: [phone] Fax: [phone]    Primary Care Provider: No primary care provider on file  Primary Insurance: [employer] MCCOY  Secondary Insurance:     DISCHARGE DETAILS:  Sherry Shoemaker the liaison from [place] [address] came this morning to complete on-site visit  Liaison stated they care able to accept today  Pt is off restraints  LOC letter sent to facility via [facility]  Liaison requested for Zyprexa to be standing order instead of PRN  [facility] informed  Pt is cleared for discharge today  [facility] transport setup for 1:30pm pick-up by [facility] emergency squad   Pt's daughter Chalinojose ramon Puri informed  IMM reviewed with Daughter Nayeli Khushi, Daughter agrees with discharge determination                                                                                            IMM Given (Date):: 09/21/22  IMM Given to[de-identified] Family  Family notified[de-identified] [facility]

## 2022-09-22 NOTE — UTILIZATION REVIEW
Notification of Discharge   This is a Notification of Discharge from our facility [address]  Please be advised that this patient has been discharge from our facility  Below you will find the admission and discharge date and time including the patients disposition  UTILIZATION REVIEW CONTACT:  Nigel Hatchet, MA  Utilization   Network Utilization Review Department  Phone: [phone] x carefully listen to the prompts  All voicemails are confidential   Email: [email or web address]     PHYSICIAN ADVISORY SERVICES:  FOR OOPN-US-FJJL REVIEW - MEDICAL NECESSITY DENIAL  Phone: [phone]  Fax: [phone]  Email: [email or web address]     PRESENTATION DATE: 9/8/2022  3:17 PM  OBERVATION ADMISSION DATE:   INPATIENT ADMISSION DATE: 9/9/22 10:53 AM   DISCHARGE DATE: 9/21/2022  2:09 PM  DISPOSITION: Non [facility] Non [facility]/S[facility]      IMPORTANT INFORMATION:  Send all requests for admission clinical reviews, approved or denied determinations and any other requests to dedicated fax number below belonging to the campus where the patient is receiving treatment   List of dedicated fax numbers:  [address] DENIALS (Administrative/Medical Necessity) [phone]   [address] (Maternity/NICU/Pediatrics) [phone]   Kirsten Flatten [phone]   [address] [place] [address] [phone]   [address] [place] [address] [phone]   [address] [phone]   [facility]  [phone]   [address] [facility], [facility]  [address] [facility] [place] [facility] [place] [address] [facility] [phone]

## 2022-10-18 PROBLEM — N39.0 UTI (URINARY TRACT INFECTION): Status: RESOLVED | Noted: 2022-08-13 | Resolved: 2022-10-18

## 2022-11-17 ENCOUNTER — OFFICE VISIT (OUTPATIENT)
Dept: VASCULAR SURGERY | Facility: CLINIC | Age: 87
End: 2022-11-17

## 2022-11-17 VITALS — HEART RATE: 65 BPM | DIASTOLIC BLOOD PRESSURE: 80 MMHG | SYSTOLIC BLOOD PRESSURE: 140 MMHG

## 2022-11-17 DIAGNOSIS — N18.30 STAGE 3 CHRONIC KIDNEY DISEASE, UNSPECIFIED WHETHER STAGE 3A OR 3B CKD (HCC): ICD-10-CM

## 2022-11-17 DIAGNOSIS — R23.4 ESCHAR OF HEEL: Primary | ICD-10-CM

## 2022-11-17 NOTE — PROGRESS NOTES
Assessment/Plan:    Eschar of heel  24-year-old male with Alzheimer's dementia, hypertension, neuropathy, CKD, oxygen dependency  Presents for vascular evaluation     Patient  with aide from nursing facility  Large left heel eschar and right lateral foot ulceration X 2   Dopplerable DP/PT signal  Per aide patient ambulates with a walker with PT  Patient not able to give accurate history     -dry eschars with no associated cellulitis, patient appears nontoxic   -Recommended lower extremity arterial duplex to evaluate arterial perfusion and healing potential   -Betadine paint to all wounds   -Offload heels while in bed   -Return to the office with vascular surgeon to review study and discuss treatment options   -Recommend next visit patient accompanied by  with family member, POA or next of kin       Diagnoses and all orders for this visit:    Eschar of heel  -     VAS lower limb arterial duplex, complete bilateral; Future    Stage 3 chronic kidney disease, unspecified whether stage 3a or 3b CKD ([facility]          Subjective:      Patient ID: Sunita Garcia is a 80 y o  male  Patient is new to our practice  Patient has a open Left heel Ulcer  Patient c/o pain on his back and neck  Patient is currently taking Plavix  HPI  24-year-old male with Alzheimer's dementia, hypertension, neuropathy, CKD  Presents for vascular evaluation  Patient resides at [address]  He is in a wheelchair  He is accompanied by aide from facility  Aide relays patient can walk with physical therapy with a walker  Patient is a poor historian  He has a large left heel eschar and 2 areas of scabbed ulceration of the right lateral foot and lateral heel  Unknown duration of left heel eschar  Patient was hospitalized in September and noted to have right lateral foot wounds and sacral decubiti  He is complaining of pain from neck to toes  No fever or chills  No associated cellulitis to wounds    Eschars are all dry and stable       Patient not able to give accurate history The following portions of the patient's history were reviewed and updated as appropriate: allergies, current medications, past family history, past medical history, past social history, past surgical history and problem list   Review of systems reviewed    Review of Systems   Constitutional: Negative  HENT: Negative  Eyes: Negative  Respiratory: Negative  Cardiovascular: Negative  Gastrointestinal: Negative  Endocrine: Negative  Genitourinary: Negative  Musculoskeletal: Positive for gait problem (wheelchair)  Skin: Negative  Allergic/Immunologic: Negative  Hematological: Negative  Psychiatric/Behavioral: Negative  Objective:    I have reviewed and made appropriate changes to the review of systems input by the medical assistant  Vitals:    11/17/22 0922   BP: 140/80   BP Location: Right arm   Patient Position: Sitting   Cuff Size: Adult   Pulse: 65       Patient Active Problem List   Diagnosis   • Late onset Alzheimer's disease with behavioral disturbance ([facility])   • Benign prostatic hyperplasia with post-void dribbling   • Rheumatoid arthritis with positive rheumatoid factor ([facility])   • Agitation due to dementia   • Primary hypertension   • PVD (peripheral vascular disease) ([facility])   • Positive blood cultures   • Buttock wound   • Pressure injury of sacral region, stage 2 ([facility])   • Inability to return to living situation   • Stage 3 chronic kidney disease, unspecified whether stage 3a or 3b CKD ([facility])   • Eschar of heel       No past surgical history on file  No family history on file      Social History     Socioeconomic History   • Marital status: Single     Spouse name: Not on file   • Number of children: Not on file   • Years of education: Not on file   • Highest education level: Not on file   Occupational History   • Not on file   Tobacco Use   • Smoking status: Never   • Smokeless tobacco: Never   Vaping Use   • Vaping Use: Never used   Substance and Sexual Activity   • Alcohol use: Not Currently   • Drug use: Never   • Sexual activity: Not Currently   Other Topics Concern   • Not on file   Social History Narrative   • Not on file     Social Determinants of Health     Financial Resource Strain: Not on file   Food Insecurity: No Food Insecurity   • Worried About Running Out of Food in the Last Year: Never true   • Ran Out of Food in the Last Year: Never true   Transportation Needs: Not on file   Physical Activity: Not on file   Stress: Not on file   Social Connections: Not on file   Intimate Partner Violence: Not on file   Housing Stability: Unknown   • Unable to Pay for Housing in the Last Year: No   • Number of Places Lived in the Last Year: Not on file   • Unstable Housing in the Last Year: No       Allergies   Allergen Reactions   • Doxycycline GI Intolerance   • Levaquin [Levofloxacin] GI Intolerance   • Penicillins Edema         Current Outpatient Medications:   •  amLODIPine (NORVASC) 5 mg tablet, Take 1 tablet (5 mg total) by mouth daily, Disp: 30 tablet, Rfl: 0  •  citalopram (CeleXA) 20 mg tablet, Take 1 tablet by mouth daily, Disp: , Rfl:   •  clopidogrel (PLAVIX) 75 mg tablet, Take 1 tablet by mouth daily, Disp: , Rfl:   •  donepezil (ARICEPT) 10 mg tablet, Take 1 tablet (10 mg total) by mouth daily at bedtime, Disp: 30 tablet, Rfl: 11  •  gabapentin (NEURONTIN) 100 mg capsule, Take 100 mg by mouth 2 (two) times a day, Disp: , Rfl:   •  OLANZapine (ZyPREXA ZYDIS) 5 mg dispersible tablet, Take 1 tablet (5 mg total) by mouth every 3 (three) hours as needed (agitation), Disp: 10 tablet, Rfl: 0  •  predniSONE 20 mg tablet, Take 10 mg by mouth daily, Disp: , Rfl:   •  QUEtiapine (SEROquel) 100 mg tablet, Take 1 tablet (100 mg total) by mouth daily at bedtime, Disp: 30 tablet, Rfl: 0  /80 (BP Location: Right arm, Patient Position: Sitting, Cuff Size: Adult)   Pulse 65          Physical Exam  Vitals and nursing note reviewed  Exam conducted with a chaperone present  HENT:      Head: Normocephalic and atraumatic  Eyes:      Extraocular Movements: Extraocular movements intact  Cardiovascular:      Pulses:           Dorsalis pedis pulses are detected w/ Doppler on the right side and detected w/ Doppler on the left side  Posterior tibial pulses are detected w/ Doppler on the right side and detected w/ Doppler on the left side  Heart sounds: Normal heart sounds  Comments: Not able to assess for femoral pulses while in wheel chair  Pulmonary:      Comments: 2 L oxygen via nasal cannula  Abdominal:      General: Bowel sounds are normal    Musculoskeletal:      Comments: Trace ankle swelling bilaterally, left heel large eschar and right lateral foot and lateral heel ulceration with overlying thick scab  Bilateral shin scabs  See clinical images   Neurological:      Comments: Dementia  Patient responds in one-word answers    Oriented to person

## 2022-11-17 NOTE — PATIENT INSTRUCTIONS
Bilateral foot wounds, left greater than right  Recommended offloading heels while in bed and Betadine paint daily to left heel and right lateral foot wounds    Will evaluate with lower extremity arterial duplex for healing potential   Follow up in the office after testing with vascular surgeon and family member/POA/next of kin to review study

## 2022-11-17 NOTE — ASSESSMENT & PLAN NOTE
80-year-old male with Alzheimer's dementia, hypertension, neuropathy, CKD, oxygen dependency    Presents for vascular evaluation     Patient  with aide from nursing facility  Large left heel eschar and right lateral foot ulceration X 2   Dopplerable DP/PT signal  Per aide patient ambulates with a walker with PT  Patient not able to give accurate history     -dry eschars with no associated cellulitis, patient appears nontoxic   -Recommended lower extremity arterial duplex to evaluate arterial perfusion and healing potential   -Betadine paint to all wounds   -Offload heels while in bed   -Return to the office with vascular surgeon to review study and discuss treatment options   -Recommend next visit patient accompanied by  with family member, POA or next of kin

## 2024-05-05 NOTE — PLAN OF CARE
Problem: Potential for Falls  Goal: Patient will remain free of falls  Description: INTERVENTIONS:  - Educate patient/family on patient safety including physical limitations  - Instruct patient to call for assistance with activity   - Consult OT/PT to assist with strengthening/mobility   - Keep Call bell within reach  - Keep bed low and locked with side rails adjusted as appropriate  - Keep care items and personal belongings within reach  - Initiate and maintain comfort rounds  - Make Fall Risk Sign visible to staff  - Offer Toileting every 2 Hours, in advance of need  - Initiate/Maintain bed alarm  - Obtain necessary fall risk management equipment  - Apply yellow socks and bracelet for high fall risk patients  - Consider moving patient to room near nurses station  Outcome: Progressing     Problem: Prexisting or High Potential for Compromised Skin Integrity  Goal: Skin integrity is maintained or improved  Description: INTERVENTIONS:  - Identify patients at risk for skin breakdown  - Assess and monitor skin integrity  - Assess and monitor nutrition and hydration status  - Monitor labs   - Assess for incontinence   - Turn and reposition patient  - Assist with mobility/ambulation  - Relieve pressure over bony prominences  - Avoid friction and shearing  - Provide appropriate hygiene as needed including keeping skin clean and dry  - Evaluate need for skin moisturizer/barrier cream  - Collaborate with interdisciplinary team   - Patient/family teaching  - Consider wound care consult   Outcome: Progressing     Problem: MOBILITY - ADULT  Goal: Maintain or return to baseline ADL function  Description: INTERVENTIONS:  -  Assess patient's ability to carry out ADLs; assess patient's baseline for ADL function and identify physical deficits which impact ability to perform ADLs (bathing, care of mouth/teeth, toileting, grooming, dressing, etc )  - Assess/evaluate cause of self-care deficits   - Assess range of motion  - Assess patient's mobility; develop plan if impaired  - Assess patient's need for assistive devices and provide as appropriate  - Encourage maximum independence but intervene and supervise when necessary  - Involve family in performance of ADLs  - Assess for home care needs following discharge   - Consider OT consult to assist with ADL evaluation and planning for discharge  - Provide patient education as appropriate  Outcome: Progressing  Goal: Maintains/Returns to pre admission functional level  Description: INTERVENTIONS:  - Perform BMAT or MOVE assessment daily    - Set and communicate daily mobility goal to care team and patient/family/caregiver  - Collaborate with rehabilitation services on mobility goals if consulted  - Perform Range of Motion 4 times a day  - Reposition patient every 2 hours    - Dangle patient 3 times a day  - Stand patient 3 times a day  - Ambulate patient 3 times a day  - Out of bed to chair 3 times a day   - Out of bed for meals 3 times a day  - Out of bed for toileting  - Record patient progress and toleration of activity level   Outcome: Progressing 24